# Patient Record
Sex: FEMALE | Race: BLACK OR AFRICAN AMERICAN | NOT HISPANIC OR LATINO | URBAN - METROPOLITAN AREA
[De-identification: names, ages, dates, MRNs, and addresses within clinical notes are randomized per-mention and may not be internally consistent; named-entity substitution may affect disease eponyms.]

---

## 2018-06-02 ENCOUNTER — EMERGENCY (EMERGENCY)
Facility: HOSPITAL | Age: 38
LOS: 1 days | Discharge: HOME | End: 2018-06-02
Attending: EMERGENCY MEDICINE | Admitting: EMERGENCY MEDICINE
Payer: MEDICAID

## 2018-06-02 VITALS
HEART RATE: 94 BPM | SYSTOLIC BLOOD PRESSURE: 105 MMHG | TEMPERATURE: 98 F | RESPIRATION RATE: 20 BRPM | DIASTOLIC BLOOD PRESSURE: 65 MMHG | OXYGEN SATURATION: 100 %

## 2018-06-02 VITALS
DIASTOLIC BLOOD PRESSURE: 78 MMHG | RESPIRATION RATE: 16 BRPM | OXYGEN SATURATION: 100 % | TEMPERATURE: 97 F | SYSTOLIC BLOOD PRESSURE: 113 MMHG | HEART RATE: 82 BPM

## 2018-06-02 LAB
ALBUMIN SERPL ELPH-MCNC: 3.9 G/DL — SIGNIFICANT CHANGE UP (ref 3.3–5)
ALP SERPL-CCNC: 107 U/L — SIGNIFICANT CHANGE UP (ref 40–120)
ALT FLD-CCNC: 19 U/L — SIGNIFICANT CHANGE UP (ref 4–33)
ANISOCYTOSIS BLD QL: SLIGHT — SIGNIFICANT CHANGE UP
APPEARANCE UR: CLEAR — SIGNIFICANT CHANGE UP
AST SERPL-CCNC: 23 U/L — SIGNIFICANT CHANGE UP (ref 4–32)
BASOPHILS # BLD AUTO: 0.09 K/UL — SIGNIFICANT CHANGE UP (ref 0–0.2)
BASOPHILS NFR BLD AUTO: 0.5 % — SIGNIFICANT CHANGE UP (ref 0–2)
BASOPHILS NFR SPEC: 0 % — SIGNIFICANT CHANGE UP (ref 0–2)
BILIRUB SERPL-MCNC: 0.3 MG/DL — SIGNIFICANT CHANGE UP (ref 0.2–1.2)
BILIRUB UR-MCNC: NEGATIVE — SIGNIFICANT CHANGE UP
BLOOD UR QL VISUAL: HIGH
BUN SERPL-MCNC: 8 MG/DL — SIGNIFICANT CHANGE UP (ref 7–23)
CALCIUM SERPL-MCNC: 8.6 MG/DL — SIGNIFICANT CHANGE UP (ref 8.4–10.5)
CHLORIDE SERPL-SCNC: 98 MMOL/L — SIGNIFICANT CHANGE UP (ref 98–107)
CO2 SERPL-SCNC: 26 MMOL/L — SIGNIFICANT CHANGE UP (ref 22–31)
COLOR SPEC: SIGNIFICANT CHANGE UP
CREAT SERPL-MCNC: 0.6 MG/DL — SIGNIFICANT CHANGE UP (ref 0.5–1.3)
EOSINOPHIL # BLD AUTO: 0.01 K/UL — SIGNIFICANT CHANGE UP (ref 0–0.5)
EOSINOPHIL NFR BLD AUTO: 0.1 % — SIGNIFICANT CHANGE UP (ref 0–6)
EOSINOPHIL NFR FLD: 0 % — SIGNIFICANT CHANGE UP (ref 0–6)
GIANT PLATELETS BLD QL SMEAR: PRESENT — SIGNIFICANT CHANGE UP
GLUCOSE SERPL-MCNC: 244 MG/DL — HIGH (ref 70–99)
GLUCOSE UR-MCNC: >1000 — SIGNIFICANT CHANGE UP
HCG SERPL-ACNC: < 5 MIU/ML — SIGNIFICANT CHANGE UP
HCT VFR BLD CALC: 39.7 % — SIGNIFICANT CHANGE UP (ref 34.5–45)
HGB BLD-MCNC: 10.6 G/DL — LOW (ref 11.5–15.5)
HYPOCHROMIA BLD QL: SIGNIFICANT CHANGE UP
IMM GRANULOCYTES # BLD AUTO: 0.12 # — SIGNIFICANT CHANGE UP
IMM GRANULOCYTES NFR BLD AUTO: 0.7 % — SIGNIFICANT CHANGE UP (ref 0–1.5)
KETONES UR-MCNC: NEGATIVE — SIGNIFICANT CHANGE UP
LEUKOCYTE ESTERASE UR-ACNC: NEGATIVE — SIGNIFICANT CHANGE UP
LYMPHOCYTES # BLD AUTO: 1.09 K/UL — SIGNIFICANT CHANGE UP (ref 1–3.3)
LYMPHOCYTES # BLD AUTO: 6 % — LOW (ref 13–44)
LYMPHOCYTES NFR SPEC AUTO: 0 % — LOW (ref 13–44)
MACROCYTES BLD QL: SLIGHT — SIGNIFICANT CHANGE UP
MCHC RBC-ENTMCNC: 18.1 PG — LOW (ref 27–34)
MCHC RBC-ENTMCNC: 26.7 % — LOW (ref 32–36)
MCV RBC AUTO: 67.7 FL — LOW (ref 80–100)
MICROCYTES BLD QL: SLIGHT — SIGNIFICANT CHANGE UP
MONOCYTES # BLD AUTO: 0.62 K/UL — SIGNIFICANT CHANGE UP (ref 0–0.9)
MONOCYTES NFR BLD AUTO: 3.4 % — SIGNIFICANT CHANGE UP (ref 2–14)
MONOCYTES NFR BLD: 1.8 % — LOW (ref 2–9)
MUCOUS THREADS # UR AUTO: SIGNIFICANT CHANGE UP
NEUTROPHIL AB SER-ACNC: 84.8 % — HIGH (ref 43–77)
NEUTROPHILS # BLD AUTO: 16.34 K/UL — HIGH (ref 1.8–7.4)
NEUTROPHILS NFR BLD AUTO: 89.3 % — HIGH (ref 43–77)
NEUTS BAND # BLD: 5.4 % — SIGNIFICANT CHANGE UP (ref 0–6)
NITRITE UR-MCNC: NEGATIVE — SIGNIFICANT CHANGE UP
NRBC # FLD: 0 — SIGNIFICANT CHANGE UP
PH UR: 7 — SIGNIFICANT CHANGE UP (ref 4.6–8)
PLATELET # BLD AUTO: 291 K/UL — SIGNIFICANT CHANGE UP (ref 150–400)
PLATELET COUNT - ESTIMATE: NORMAL — SIGNIFICANT CHANGE UP
PMV BLD: SIGNIFICANT CHANGE UP FL (ref 7–13)
POIKILOCYTOSIS BLD QL AUTO: SIGNIFICANT CHANGE UP
POLYCHROMASIA BLD QL SMEAR: SIGNIFICANT CHANGE UP
POTASSIUM SERPL-MCNC: 4.8 MMOL/L — SIGNIFICANT CHANGE UP (ref 3.5–5.3)
POTASSIUM SERPL-SCNC: 4.8 MMOL/L — SIGNIFICANT CHANGE UP (ref 3.5–5.3)
PROT SERPL-MCNC: 8.5 G/DL — HIGH (ref 6–8.3)
PROT UR-MCNC: 20 MG/DL — SIGNIFICANT CHANGE UP
RBC # BLD: 5.86 M/UL — HIGH (ref 3.8–5.2)
RBC # FLD: 32.5 % — HIGH (ref 10.3–14.5)
RBC CASTS # UR COMP ASSIST: SIGNIFICANT CHANGE UP (ref 0–?)
SODIUM SERPL-SCNC: 134 MMOL/L — LOW (ref 135–145)
SP GR SPEC: 1.01 — SIGNIFICANT CHANGE UP (ref 1–1.04)
SQUAMOUS # UR AUTO: SIGNIFICANT CHANGE UP
TARGETS BLD QL SMEAR: SIGNIFICANT CHANGE UP
UROBILINOGEN FLD QL: NORMAL MG/DL — SIGNIFICANT CHANGE UP
VARIANT LYMPHS # BLD: 8 % — SIGNIFICANT CHANGE UP
WBC # BLD: 18.27 K/UL — HIGH (ref 3.8–10.5)
WBC # FLD AUTO: 18.27 K/UL — HIGH (ref 3.8–10.5)
WBC UR QL: SIGNIFICANT CHANGE UP (ref 0–?)

## 2018-06-02 PROCEDURE — 99284 EMERGENCY DEPT VISIT MOD MDM: CPT

## 2018-06-02 PROCEDURE — 71046 X-RAY EXAM CHEST 2 VIEWS: CPT | Mod: 26

## 2018-06-02 RX ORDER — INSULIN HUMAN 100 [IU]/ML
20 INJECTION, SOLUTION SUBCUTANEOUS ONCE
Qty: 0 | Refills: 0 | Status: COMPLETED | OUTPATIENT
Start: 2018-06-02 | End: 2018-06-02

## 2018-06-02 RX ORDER — HUMAN INSULIN 100 [IU]/ML
7 INJECTION, SUSPENSION SUBCUTANEOUS ONCE
Qty: 0 | Refills: 0 | Status: DISCONTINUED | OUTPATIENT
Start: 2018-06-02 | End: 2018-06-02

## 2018-06-02 RX ORDER — INSULIN HUMAN 100 [IU]/ML
20 INJECTION, SOLUTION SUBCUTANEOUS ONCE
Qty: 0 | Refills: 0 | Status: DISCONTINUED | OUTPATIENT
Start: 2018-06-02 | End: 2018-06-02

## 2018-06-02 RX ADMIN — INSULIN HUMAN 20 UNIT(S): 100 INJECTION, SOLUTION SUBCUTANEOUS at 19:53

## 2018-06-02 NOTE — ED PROVIDER NOTE - PROGRESS NOTE DETAILS
pt feeling improved. tolerating PO. labs pending. to give pt's evening dose of regular insulin, monitor for episode of hypoglycemia. - Rob Simons MD pt feels well at this time. lab with elevated wbc but no other sig abnormality. pt denies any infectious sx. has eaten meal in ED. recently given dose of regular insulin, will monitor, if feels well and repeat wbg wnl plan for d/c. - Rob Simons MD DO Héctor: Patient feels improved at this time. Ate meal and was given evening dose of insulin. No infectious pathology on labs or cxr. Has had no further episodes of hypoglycemia. Educated on not dieting without consulting with her endocrinologist for adjustment of her insulin dose. Pt feels well. Elevated WBG at this time, but just received dose short acting insulin. To take dose long acting insulin when returns home. Discussed indications to return to ED, importance of f/u with pmd iwthin next 2-3 days. Educated on importance of not changing meal intake without first speaking to her doctor. Stable for d/c. - Rob Simons MD

## 2018-06-02 NOTE — ED ADULT NURSE NOTE - OBJECTIVE STATEMENT
Pt rcopal in room 5, aox3, ambulatory, presents to the ed with hypoglycemia, FS<20, was given IM glucagon and oral glucose by EMS, FS in the ED now 131. Pt seen by MD, pt NAD, laying comfortably in stretcher.

## 2018-06-02 NOTE — ED ADULT TRIAGE NOTE - CHIEF COMPLAINT QUOTE
Pt with hypoglycemic episode today, upon EMS arrival pt was lethargic and pale, FS was <20. Pt received IM glucagon and oral glucose by EMS with improvement to . Pt denies any complaints at this time. H/o HIV, DM.

## 2018-06-02 NOTE — ED PROVIDER NOTE - OBJECTIVE STATEMENT
36 y/o F with h/o HIV 36 y/o F with h/o HIV, DM presents with complaint of hypoglycemia. Patient was feeling faint this afternoon and father called EMS. FSG was less than 20 as per EMS and received IM glucagon as  well as oral glucose. Now feels improved. States that she started a new diet today and did not have any lunch. Also began having menstrual period yesterday and has history of low glucose in setting of menstrual periods. No recent medication adjustments. Takes 30 U regular insuliun and 10U NPH in morning, 20U regular insulin and 7U NPH in evening. Last took insulin 9 hours prior to arrival. Denies fever, chills, chest pain, SOB, abdominal pain, nausea/vomiting, diarrhea, dysuria, increased frequency.

## 2018-06-02 NOTE — ED PROVIDER NOTE - ATTENDING CONTRIBUTION TO CARE
37F with HIV, DM, presents s/p episode hypoglycemia. Pt states felt weak this afternoon, yelled out to father who found patient minimally responsive. EMS arrived, found WBG to be 20, gave IM glucagon and oral glucose. Pt now feels at baseline. Pt started new diet yesterday, today ate only a "diet meal" for breakfast and did not eat lunch. Pt typically eats lunch. Also with LMP beginning yesterday. Denies any additional doses of insulin, took morning dose as written. Denies cough, congestion, f/c, n/v/d, dysuria, hematuria, chest pain, sob.     PE: NAD, NCAT, MMM, Trachea midline, Normal conjunctiva, lungs CTAB, S1/S2 RRR, Normal perfusion, 2+ radial pulses bilat, Abdomen Soft, NTND, No rebound/guarding, No LE edema, No deformity of extremities, No rashes,  No focal motor or sensory deficits.     37F with HIV, DM, presents s/p episode hypoglycemia. WBG now wnl. Pt ate diet meal for breakfast and did not eat lunch, which explains hypoglycemia. Also currently menstruating, pt states wbg drops during menstruation. Check CBC eval for anemia, cmp eval for metabolic derangement. UA eval for UTI, CXR eval for pneumonia. Feed, observe, and re-assess. - Rob Simons MD

## 2018-06-02 NOTE — ED PROVIDER NOTE - MEDICAL DECISION MAKING DETAILS
38 y/o F with h/o HIV, IDDM presents with hypoglycemia in setting of missed meal. Back to baseline, . Will monitor for hypoglycemia in ED. Evaluate for infectious causes of hypoglycemia. Will likely be able to be discharged if no further episodes

## 2018-06-04 LAB
BACTERIA UR CULT: SIGNIFICANT CHANGE UP
SPECIMEN SOURCE: SIGNIFICANT CHANGE UP

## 2018-09-12 ENCOUNTER — EMERGENCY (EMERGENCY)
Facility: HOSPITAL | Age: 38
LOS: 0 days | Discharge: LEFT BEFORE DISCHARGE | End: 2018-09-12
Attending: EMERGENCY MEDICINE
Payer: MEDICAID

## 2018-09-12 VITALS
TEMPERATURE: 98 F | HEART RATE: 74 BPM | WEIGHT: 125 LBS | OXYGEN SATURATION: 97 % | HEIGHT: 65 IN | RESPIRATION RATE: 15 BRPM | DIASTOLIC BLOOD PRESSURE: 67 MMHG | SYSTOLIC BLOOD PRESSURE: 102 MMHG

## 2018-09-12 VITALS
RESPIRATION RATE: 17 BRPM | HEART RATE: 82 BPM | SYSTOLIC BLOOD PRESSURE: 107 MMHG | TEMPERATURE: 99 F | DIASTOLIC BLOOD PRESSURE: 61 MMHG | OXYGEN SATURATION: 99 %

## 2018-09-12 DIAGNOSIS — Z21 ASYMPTOMATIC HUMAN IMMUNODEFICIENCY VIRUS [HIV] INFECTION STATUS: ICD-10-CM

## 2018-09-12 DIAGNOSIS — E11.649 TYPE 2 DIABETES MELLITUS WITH HYPOGLYCEMIA WITHOUT COMA: ICD-10-CM

## 2018-09-12 DIAGNOSIS — Z79.4 LONG TERM (CURRENT) USE OF INSULIN: ICD-10-CM

## 2018-09-12 LAB
GLUCOSE BLDC GLUCOMTR-MCNC: 111 MG/DL — HIGH (ref 70–99)
GLUCOSE BLDC GLUCOMTR-MCNC: 170 MG/DL — HIGH (ref 70–99)

## 2018-09-12 PROCEDURE — 99282 EMERGENCY DEPT VISIT SF MDM: CPT

## 2018-09-12 NOTE — ED PROVIDER NOTE - OBJECTIVE STATEMENT
38yo female with pmh DM, HIV presents with hypoglycemia. Pt noted low glucose yesterday but refused to go to hospital. Sister found pt acting erratically today and called EMS, with FS 40. Pt given dextrose by EMs and now AOx3. Pt refusing workup. States on humlin and lantus. Pt states she fell asleep today and did not eat. Pt denies any symptoms.     ROS: No fever/chills. No photophobia/eye pain/changes in vision, No ear pain/sore throat/dysphagia, No chest pain/palpitations. No SOB/cough/stridor. No abdominal pain, N/V/D, no black/bloody bm. No dysuria/frequency/discharge, No headache. No Dizziness.  No rash.  No numbness/tingling/weakness.

## 2018-09-12 NOTE — ED ADULT NURSE NOTE - CHIEF COMPLAINT QUOTE
sister called pt acting strange Per ems fingerstick 40mg/dl alert and oriented x2 IV access left forearm and Dextrose 10 250ml bag infusing. EMS called last night for low blood sugar and pt refused medical care

## 2018-09-12 NOTE — ED ADULT NURSE NOTE - OBJECTIVE STATEMENT
38 yo female c/o hypoglycemia x yesterday and again today, as per triage RN EMS  to pt yesterday, pt RMA. pt was given D10 pta. via L forearm access   #20 g, no s/s of infiltration. as per MD pt to sign AMA. pt expressed she wants to leave AMA. pt given tray of food and eating. pt refused to give pharmacy information.

## 2018-09-12 NOTE — ED PROVIDER NOTE - MEDICAL DECISION MAKING DETAILS
Pt clearly awake and alert. Pt able to give good history and dosing. Pt ate an entire tray of food in Er. The patient has decided to leave against medical advice.  We discussed all risks, benefits, and alternatives to the progression of treatment and the potential dangers of leaving including but not limited to permanent disability, injury, and death.  The patient was instructed that they are welcome to change their decision to leave against medical advice and return to the emergency department at any time and for any reason in order to allow us to render care.

## 2018-09-13 PROBLEM — E11.9 TYPE 2 DIABETES MELLITUS WITHOUT COMPLICATIONS: Chronic | Status: ACTIVE | Noted: 2018-06-02

## 2018-09-13 PROBLEM — B20 HUMAN IMMUNODEFICIENCY VIRUS [HIV] DISEASE: Chronic | Status: ACTIVE | Noted: 2018-06-02

## 2021-02-09 ENCOUNTER — INPATIENT (INPATIENT)
Facility: HOSPITAL | Age: 41
LOS: 7 days | Discharge: HOME HEALTH | End: 2021-02-17
Attending: INTERNAL MEDICINE | Admitting: INTERNAL MEDICINE
Payer: MEDICAID

## 2021-02-09 VITALS
SYSTOLIC BLOOD PRESSURE: 140 MMHG | WEIGHT: 145.51 LBS | RESPIRATION RATE: 17 BRPM | DIASTOLIC BLOOD PRESSURE: 78 MMHG | TEMPERATURE: 98 F | OXYGEN SATURATION: 100 % | HEIGHT: 65 IN | HEART RATE: 110 BPM

## 2021-02-09 LAB
APPEARANCE UR: CLEAR — SIGNIFICANT CHANGE UP
BILIRUB UR-MCNC: NEGATIVE — SIGNIFICANT CHANGE UP
COLOR SPEC: YELLOW — SIGNIFICANT CHANGE UP
DIFF PNL FLD: NEGATIVE — SIGNIFICANT CHANGE UP
GLUCOSE BLDC GLUCOMTR-MCNC: 409 MG/DL — HIGH (ref 70–99)
GLUCOSE BLDC GLUCOMTR-MCNC: 415 MG/DL — HIGH (ref 70–99)
GLUCOSE UR QL: 1000 MG/DL
KETONES UR-MCNC: ABNORMAL
LEUKOCYTE ESTERASE UR-ACNC: NEGATIVE — SIGNIFICANT CHANGE UP
NITRITE UR-MCNC: NEGATIVE — SIGNIFICANT CHANGE UP
PH UR: 5 — SIGNIFICANT CHANGE UP (ref 5–8)
PROT UR-MCNC: 30 MG/DL
SP GR SPEC: 1.02 — SIGNIFICANT CHANGE UP (ref 1.01–1.02)
UROBILINOGEN FLD QL: NEGATIVE MG/DL — SIGNIFICANT CHANGE UP

## 2021-02-09 PROCEDURE — 99285 EMERGENCY DEPT VISIT HI MDM: CPT

## 2021-02-09 RX ORDER — FAMOTIDINE 10 MG/ML
20 INJECTION INTRAVENOUS ONCE
Refills: 0 | Status: COMPLETED | OUTPATIENT
Start: 2021-02-09 | End: 2021-02-09

## 2021-02-09 RX ORDER — METOCLOPRAMIDE HCL 10 MG
10 TABLET ORAL ONCE
Refills: 0 | Status: COMPLETED | OUTPATIENT
Start: 2021-02-09 | End: 2021-02-09

## 2021-02-09 RX ORDER — SODIUM CHLORIDE 9 MG/ML
2000 INJECTION INTRAMUSCULAR; INTRAVENOUS; SUBCUTANEOUS ONCE
Refills: 0 | Status: COMPLETED | OUTPATIENT
Start: 2021-02-09 | End: 2021-02-09

## 2021-02-09 NOTE — ED ADULT NURSE NOTE - OBJECTIVE STATEMENT
40F awake and alert, verbally responsive with h/o HIV and DM1, p/w c/o nausea and vomiting started today and unable to keep foods or fluids, mother at bedside, reports she gave her insulin today and reports patient has multiple admission in the past for DKA. Patient afebrile, vomiting actively, no blood in vomitus noted. 40F awake and verbally responsive not oriented to anything with h/o HIV and DM1, p/w c/o nausea and vomiting started yesterday and unable to keep foods or fluids, mother at bedside, reports she gave her insulin today and reports patient has multiple admission in the past for DKA. Patient afebrile, vomiting actively, no blood in vomitus noted.  During assessment, patient is aphasic, doesn't follow commands and when mother asked about pt hisotry, patient to found out just dc from [facility], pt has been in [facility] for more than a year secondary to diabetic coma and has been in rehab since then.

## 2021-02-09 NOTE — ED ADULT NURSE NOTE - NSIMPLEMENTINTERV_GEN_ALL_ED
Implemented All Fall Risk Interventions:  [place] to call system. Call bell, personal items and telephone within reach. Instruct patient to call for assistance. Room bathroom lighting operational. Non-slip footwear when patient is off stretcher. Physically safe environment: no spills, clutter or unnecessary equipment. Stretcher in lowest position, wheels locked, appropriate side rails in place. Provide visual cue, wrist band, yellow gown, etc. Monitor gait and stability. Monitor for mental status changes and reorient to person, place, and time. Review medications for side effects contributing to fall risk. Reinforce activity limits and safety measures with patient and family.

## 2021-02-09 NOTE — ED ADULT TRIAGE NOTE - CHIEF COMPLAINT QUOTE
Patient presents in ED complaining of vomiting unable to keep food down since 11.00hrs today., HX of juvenile DM, recent changes made to insulin regime.  had received 10 units of humlog at 18.00hrs

## 2021-02-10 DIAGNOSIS — Z65.9 PROBLEM RELATED TO UNSPECIFIED PSYCHOSOCIAL CIRCUMSTANCES: ICD-10-CM

## 2021-02-10 DIAGNOSIS — E10.10 TYPE 1 DIABETES MELLITUS WITH KETOACIDOSIS WITHOUT COMA: ICD-10-CM

## 2021-02-10 DIAGNOSIS — E87.2 ACIDOSIS: ICD-10-CM

## 2021-02-10 DIAGNOSIS — B20 HUMAN IMMUNODEFICIENCY VIRUS [HIV] DISEASE: ICD-10-CM

## 2021-02-10 LAB
ACETONE SERPL-MCNC: ABNORMAL
ALBUMIN SERPL ELPH-MCNC: 3.6 G/DL — SIGNIFICANT CHANGE UP (ref 3.3–5)
ALBUMIN SERPL ELPH-MCNC: 3.6 G/DL — SIGNIFICANT CHANGE UP (ref 3.3–5)
ALBUMIN SERPL ELPH-MCNC: 4.3 G/DL — SIGNIFICANT CHANGE UP (ref 3.3–5)
ALP SERPL-CCNC: 143 U/L — HIGH (ref 40–120)
ALP SERPL-CCNC: 146 U/L — HIGH (ref 40–120)
ALP SERPL-CCNC: 173 U/L — HIGH (ref 40–120)
ALT FLD-CCNC: 22 U/L — SIGNIFICANT CHANGE UP (ref 12–78)
ALT FLD-CCNC: 24 U/L — SIGNIFICANT CHANGE UP (ref 12–78)
ALT FLD-CCNC: 28 U/L — SIGNIFICANT CHANGE UP (ref 12–78)
AMPHET UR-MCNC: NEGATIVE — SIGNIFICANT CHANGE UP
ANION GAP SERPL CALC-SCNC: 15 MMOL/L — SIGNIFICANT CHANGE UP (ref 5–17)
ANION GAP SERPL CALC-SCNC: 17 MMOL/L — SIGNIFICANT CHANGE UP (ref 5–17)
ANION GAP SERPL CALC-SCNC: 18 MMOL/L — HIGH (ref 5–17)
ANION GAP SERPL CALC-SCNC: 21 MMOL/L — HIGH (ref 5–17)
ANION GAP SERPL CALC-SCNC: 23 MMOL/L — HIGH (ref 5–17)
APTT BLD: 43.6 SEC — HIGH (ref 27.5–35.5)
AST SERPL-CCNC: 22 U/L — SIGNIFICANT CHANGE UP (ref 15–37)
AST SERPL-CCNC: 26 U/L — SIGNIFICANT CHANGE UP (ref 15–37)
AST SERPL-CCNC: 29 U/L — SIGNIFICANT CHANGE UP (ref 15–37)
BACTERIA # UR AUTO: ABNORMAL
BARBITURATES UR SCN-MCNC: NEGATIVE — SIGNIFICANT CHANGE UP
BASE EXCESS BLDA CALC-SCNC: -10.5 MMOL/L — LOW (ref -2–2)
BASE EXCESS BLDA CALC-SCNC: -12 MMOL/L — LOW (ref -2–2)
BASOPHILS # BLD AUTO: 0.05 K/UL — SIGNIFICANT CHANGE UP (ref 0–0.2)
BASOPHILS NFR BLD AUTO: 0.3 % — SIGNIFICANT CHANGE UP (ref 0–2)
BENZODIAZ UR-MCNC: NEGATIVE — SIGNIFICANT CHANGE UP
BILIRUB SERPL-MCNC: 0.4 MG/DL — SIGNIFICANT CHANGE UP (ref 0.2–1.2)
BILIRUB SERPL-MCNC: 0.5 MG/DL — SIGNIFICANT CHANGE UP (ref 0.2–1.2)
BILIRUB SERPL-MCNC: 0.5 MG/DL — SIGNIFICANT CHANGE UP (ref 0.2–1.2)
BLOOD GAS COMMENTS: SIGNIFICANT CHANGE UP
BLOOD GAS SOURCE: SIGNIFICANT CHANGE UP
BLOOD GAS SOURCE: SIGNIFICANT CHANGE UP
BUN SERPL-MCNC: 10 MG/DL — SIGNIFICANT CHANGE UP (ref 7–23)
BUN SERPL-MCNC: 14 MG/DL — SIGNIFICANT CHANGE UP (ref 7–23)
BUN SERPL-MCNC: 14 MG/DL — SIGNIFICANT CHANGE UP (ref 7–23)
BUN SERPL-MCNC: 17 MG/DL — SIGNIFICANT CHANGE UP (ref 7–23)
BUN SERPL-MCNC: 8 MG/DL — SIGNIFICANT CHANGE UP (ref 7–23)
CALCIUM SERPL-MCNC: 8.7 MG/DL — SIGNIFICANT CHANGE UP (ref 8.5–10.1)
CALCIUM SERPL-MCNC: 8.9 MG/DL — SIGNIFICANT CHANGE UP (ref 8.5–10.1)
CALCIUM SERPL-MCNC: 9.1 MG/DL — SIGNIFICANT CHANGE UP (ref 8.5–10.1)
CALCIUM SERPL-MCNC: 9.1 MG/DL — SIGNIFICANT CHANGE UP (ref 8.5–10.1)
CALCIUM SERPL-MCNC: 9.8 MG/DL — SIGNIFICANT CHANGE UP (ref 8.5–10.1)
CHLORIDE SERPL-SCNC: 100 MMOL/L — SIGNIFICANT CHANGE UP (ref 96–108)
CHLORIDE SERPL-SCNC: 107 MMOL/L — SIGNIFICANT CHANGE UP (ref 96–108)
CHLORIDE SERPL-SCNC: 107 MMOL/L — SIGNIFICANT CHANGE UP (ref 96–108)
CHLORIDE SERPL-SCNC: 108 MMOL/L — SIGNIFICANT CHANGE UP (ref 96–108)
CHLORIDE SERPL-SCNC: 109 MMOL/L — HIGH (ref 96–108)
CO2 SERPL-SCNC: 14 MMOL/L — LOW (ref 22–31)
CO2 SERPL-SCNC: 15 MMOL/L — LOW (ref 22–31)
CO2 SERPL-SCNC: 18 MMOL/L — LOW (ref 22–31)
CO2 SERPL-SCNC: 19 MMOL/L — LOW (ref 22–31)
CO2 SERPL-SCNC: 9 MMOL/L — CRITICAL LOW (ref 22–31)
COCAINE METAB.OTHER UR-MCNC: NEGATIVE — SIGNIFICANT CHANGE UP
CREAT SERPL-MCNC: 0.94 MG/DL — SIGNIFICANT CHANGE UP (ref 0.5–1.3)
CREAT SERPL-MCNC: 1 MG/DL — SIGNIFICANT CHANGE UP (ref 0.5–1.3)
CREAT SERPL-MCNC: 1 MG/DL — SIGNIFICANT CHANGE UP (ref 0.5–1.3)
CREAT SERPL-MCNC: 1.11 MG/DL — SIGNIFICANT CHANGE UP (ref 0.5–1.3)
CREAT SERPL-MCNC: 1.16 MG/DL — SIGNIFICANT CHANGE UP (ref 0.5–1.3)
EOSINOPHIL # BLD AUTO: 0 K/UL — SIGNIFICANT CHANGE UP (ref 0–0.5)
EOSINOPHIL NFR BLD AUTO: 0 % — SIGNIFICANT CHANGE UP (ref 0–6)
EPI CELLS # UR: SIGNIFICANT CHANGE UP
GLUCOSE BLDC GLUCOMTR-MCNC: 128 MG/DL — HIGH (ref 70–99)
GLUCOSE BLDC GLUCOMTR-MCNC: 180 MG/DL — HIGH (ref 70–99)
GLUCOSE BLDC GLUCOMTR-MCNC: 269 MG/DL — HIGH (ref 70–99)
GLUCOSE BLDC GLUCOMTR-MCNC: 272 MG/DL — HIGH (ref 70–99)
GLUCOSE BLDC GLUCOMTR-MCNC: 283 MG/DL — HIGH (ref 70–99)
GLUCOSE BLDC GLUCOMTR-MCNC: 288 MG/DL — HIGH (ref 70–99)
GLUCOSE BLDC GLUCOMTR-MCNC: 293 MG/DL — HIGH (ref 70–99)
GLUCOSE BLDC GLUCOMTR-MCNC: 295 MG/DL — HIGH (ref 70–99)
GLUCOSE BLDC GLUCOMTR-MCNC: 354 MG/DL — HIGH (ref 70–99)
GLUCOSE BLDC GLUCOMTR-MCNC: 363 MG/DL — HIGH (ref 70–99)
GLUCOSE BLDC GLUCOMTR-MCNC: 373 MG/DL — HIGH (ref 70–99)
GLUCOSE BLDC GLUCOMTR-MCNC: 449 MG/DL — HIGH (ref 70–99)
GLUCOSE BLDC GLUCOMTR-MCNC: 74 MG/DL — SIGNIFICANT CHANGE UP (ref 70–99)
GLUCOSE BLDC GLUCOMTR-MCNC: 75 MG/DL — SIGNIFICANT CHANGE UP (ref 70–99)
GLUCOSE SERPL-MCNC: 299 MG/DL — HIGH (ref 70–99)
GLUCOSE SERPL-MCNC: 311 MG/DL — HIGH (ref 70–99)
GLUCOSE SERPL-MCNC: 388 MG/DL — HIGH (ref 70–99)
GLUCOSE SERPL-MCNC: 472 MG/DL — CRITICAL HIGH (ref 70–99)
GLUCOSE SERPL-MCNC: 65 MG/DL — LOW (ref 70–99)
HCG SERPL-ACNC: <1 MIU/ML — SIGNIFICANT CHANGE UP
HCO3 BLDA-SCNC: 13 MMOL/L — LOW (ref 21–29)
HCO3 BLDA-SCNC: 14 MMOL/L — LOW (ref 21–29)
HCT VFR BLD CALC: 39.6 % — SIGNIFICANT CHANGE UP (ref 34.5–45)
HCT VFR BLD CALC: 40 % — SIGNIFICANT CHANGE UP (ref 34.5–45)
HCT VFR BLD CALC: 46.3 % — HIGH (ref 34.5–45)
HGB BLD-MCNC: 11.9 G/DL — SIGNIFICANT CHANGE UP (ref 11.5–15.5)
HGB BLD-MCNC: 12.1 G/DL — SIGNIFICANT CHANGE UP (ref 11.5–15.5)
HGB BLD-MCNC: 14.1 G/DL — SIGNIFICANT CHANGE UP (ref 11.5–15.5)
HOROWITZ INDEX BLDA+IHG-RTO: 21 — SIGNIFICANT CHANGE UP
HOROWITZ INDEX BLDA+IHG-RTO: 21 — SIGNIFICANT CHANGE UP
IMM GRANULOCYTES NFR BLD AUTO: 0.3 % — SIGNIFICANT CHANGE UP (ref 0–1.5)
INR BLD: 1.05 RATIO — SIGNIFICANT CHANGE UP (ref 0.88–1.16)
LACTATE SERPL-SCNC: 1.9 MMOL/L — SIGNIFICANT CHANGE UP (ref 0.7–2)
LACTATE SERPL-SCNC: 2.8 MMOL/L — HIGH (ref 0.7–2)
LACTATE SERPL-SCNC: 2.8 MMOL/L — HIGH (ref 0.7–2)
LIDOCAIN IGE QN: 36 U/L — LOW (ref 73–393)
LYMPHOCYTES # BLD AUTO: 0.92 K/UL — LOW (ref 1–3.3)
LYMPHOCYTES # BLD AUTO: 6 % — LOW (ref 13–44)
MAGNESIUM SERPL-MCNC: 2 MG/DL — SIGNIFICANT CHANGE UP (ref 1.6–2.6)
MAGNESIUM SERPL-MCNC: 2.1 MG/DL — SIGNIFICANT CHANGE UP (ref 1.6–2.6)
MCHC RBC-ENTMCNC: 23.9 PG — LOW (ref 27–34)
MCHC RBC-ENTMCNC: 23.9 PG — LOW (ref 27–34)
MCHC RBC-ENTMCNC: 24 PG — LOW (ref 27–34)
MCHC RBC-ENTMCNC: 30.1 GM/DL — LOW (ref 32–36)
MCHC RBC-ENTMCNC: 30.3 GM/DL — LOW (ref 32–36)
MCHC RBC-ENTMCNC: 30.5 GM/DL — LOW (ref 32–36)
MCV RBC AUTO: 78.3 FL — LOW (ref 80–100)
MCV RBC AUTO: 79.1 FL — LOW (ref 80–100)
MCV RBC AUTO: 79.8 FL — LOW (ref 80–100)
METHADONE UR-MCNC: NEGATIVE — SIGNIFICANT CHANGE UP
MONOCYTES # BLD AUTO: 0.37 K/UL — SIGNIFICANT CHANGE UP (ref 0–0.9)
MONOCYTES NFR BLD AUTO: 2.4 % — SIGNIFICANT CHANGE UP (ref 2–14)
NEUTROPHILS # BLD AUTO: 13.94 K/UL — HIGH (ref 1.8–7.4)
NEUTROPHILS NFR BLD AUTO: 91 % — HIGH (ref 43–77)
NRBC # BLD: 0 /100 WBCS — SIGNIFICANT CHANGE UP (ref 0–0)
OPIATES UR-MCNC: NEGATIVE — SIGNIFICANT CHANGE UP
PCO2 BLDA: 26 MMHG — LOW (ref 32–46)
PCO2 BLDA: 30 MMHG — LOW (ref 32–46)
PCP SPEC-MCNC: SIGNIFICANT CHANGE UP
PCP UR-MCNC: NEGATIVE — SIGNIFICANT CHANGE UP
PH BLD: 7.3 — LOW (ref 7.35–7.45)
PH BLD: 7.31 — LOW (ref 7.35–7.45)
PHOSPHATE SERPL-MCNC: 1 MG/DL — CRITICAL LOW (ref 2.5–4.5)
PHOSPHATE SERPL-MCNC: 2.1 MG/DL — LOW (ref 2.5–4.5)
PLATELET # BLD AUTO: 188 K/UL — SIGNIFICANT CHANGE UP (ref 150–400)
PLATELET # BLD AUTO: 194 K/UL — SIGNIFICANT CHANGE UP (ref 150–400)
PLATELET # BLD AUTO: 217 K/UL — SIGNIFICANT CHANGE UP (ref 150–400)
PO2 BLDA: 85 MMHG — SIGNIFICANT CHANGE UP (ref 74–108)
PO2 BLDA: 87 MMHG — SIGNIFICANT CHANGE UP (ref 74–108)
POTASSIUM SERPL-MCNC: 3.9 MMOL/L — SIGNIFICANT CHANGE UP (ref 3.5–5.3)
POTASSIUM SERPL-MCNC: 4.2 MMOL/L — SIGNIFICANT CHANGE UP (ref 3.5–5.3)
POTASSIUM SERPL-MCNC: 4.3 MMOL/L — SIGNIFICANT CHANGE UP (ref 3.5–5.3)
POTASSIUM SERPL-MCNC: 4.5 MMOL/L — SIGNIFICANT CHANGE UP (ref 3.5–5.3)
POTASSIUM SERPL-MCNC: 4.8 MMOL/L — SIGNIFICANT CHANGE UP (ref 3.5–5.3)
POTASSIUM SERPL-SCNC: 3.9 MMOL/L — SIGNIFICANT CHANGE UP (ref 3.5–5.3)
POTASSIUM SERPL-SCNC: 4.2 MMOL/L — SIGNIFICANT CHANGE UP (ref 3.5–5.3)
POTASSIUM SERPL-SCNC: 4.3 MMOL/L — SIGNIFICANT CHANGE UP (ref 3.5–5.3)
POTASSIUM SERPL-SCNC: 4.5 MMOL/L — SIGNIFICANT CHANGE UP (ref 3.5–5.3)
POTASSIUM SERPL-SCNC: 4.8 MMOL/L — SIGNIFICANT CHANGE UP (ref 3.5–5.3)
PROT SERPL-MCNC: 8.1 GM/DL — SIGNIFICANT CHANGE UP (ref 6–8.3)
PROT SERPL-MCNC: 8.1 GM/DL — SIGNIFICANT CHANGE UP (ref 6–8.3)
PROT SERPL-MCNC: 9.9 GM/DL — HIGH (ref 6–8.3)
PROTHROM AB SERPL-ACNC: 12.1 SEC — SIGNIFICANT CHANGE UP (ref 10.6–13.6)
RAPID RVP RESULT: SIGNIFICANT CHANGE UP
RBC # BLD: 4.96 M/UL — SIGNIFICANT CHANGE UP (ref 3.8–5.2)
RBC # BLD: 5.06 M/UL — SIGNIFICANT CHANGE UP (ref 3.8–5.2)
RBC # BLD: 5.91 M/UL — HIGH (ref 3.8–5.2)
RBC # FLD: 14.4 % — SIGNIFICANT CHANGE UP (ref 10.3–14.5)
RBC # FLD: 14.8 % — HIGH (ref 10.3–14.5)
RBC # FLD: 14.9 % — HIGH (ref 10.3–14.5)
RBC CASTS # UR COMP ASSIST: NEGATIVE /HPF — SIGNIFICANT CHANGE UP (ref 0–4)
SAO2 % BLDA: 96 % — SIGNIFICANT CHANGE UP (ref 92–96)
SAO2 % BLDA: 96 % — SIGNIFICANT CHANGE UP (ref 92–96)
SARS-COV-2 IGG SERPL QL IA: POSITIVE
SARS-COV-2 IGM SERPL IA-ACNC: 54.8 INDEX — HIGH
SARS-COV-2 RNA SPEC QL NAA+PROBE: SIGNIFICANT CHANGE UP
SODIUM SERPL-SCNC: 138 MMOL/L — SIGNIFICANT CHANGE UP (ref 135–145)
SODIUM SERPL-SCNC: 139 MMOL/L — SIGNIFICANT CHANGE UP (ref 135–145)
SODIUM SERPL-SCNC: 140 MMOL/L — SIGNIFICANT CHANGE UP (ref 135–145)
SODIUM SERPL-SCNC: 141 MMOL/L — SIGNIFICANT CHANGE UP (ref 135–145)
SODIUM SERPL-SCNC: 142 MMOL/L — SIGNIFICANT CHANGE UP (ref 135–145)
THC UR QL: NEGATIVE — SIGNIFICANT CHANGE UP
TSH SERPL-MCNC: 2.93 UU/ML — SIGNIFICANT CHANGE UP (ref 0.36–3.74)
WBC # BLD: 15.33 K/UL — HIGH (ref 3.8–10.5)
WBC # BLD: 18.17 K/UL — HIGH (ref 3.8–10.5)
WBC # BLD: 20.79 K/UL — HIGH (ref 3.8–10.5)
WBC # FLD AUTO: 15.33 K/UL — HIGH (ref 3.8–10.5)
WBC # FLD AUTO: 18.17 K/UL — HIGH (ref 3.8–10.5)
WBC # FLD AUTO: 20.79 K/UL — HIGH (ref 3.8–10.5)
WBC UR QL: SIGNIFICANT CHANGE UP

## 2021-02-10 PROCEDURE — 93010 ELECTROCARDIOGRAM REPORT: CPT

## 2021-02-10 PROCEDURE — 74177 CT ABD & PELVIS W/CONTRAST: CPT | Mod: 26,MA

## 2021-02-10 PROCEDURE — 99222 1ST HOSP IP/OBS MODERATE 55: CPT

## 2021-02-10 PROCEDURE — 70450 CT HEAD/BRAIN W/O DYE: CPT | Mod: 26,MA

## 2021-02-10 PROCEDURE — 99291 CRITICAL CARE FIRST HOUR: CPT

## 2021-02-10 RX ORDER — SODIUM CHLORIDE 9 MG/ML
1000 INJECTION INTRAMUSCULAR; INTRAVENOUS; SUBCUTANEOUS ONCE
Refills: 0 | Status: COMPLETED | OUTPATIENT
Start: 2021-02-10 | End: 2021-02-10

## 2021-02-10 RX ORDER — SODIUM CHLORIDE 9 MG/ML
1000 INJECTION, SOLUTION INTRAVENOUS
Refills: 0 | Status: DISCONTINUED | OUTPATIENT
Start: 2021-02-10 | End: 2021-02-17

## 2021-02-10 RX ORDER — INSULIN LISPRO 100/ML
12 VIAL (ML) SUBCUTANEOUS ONCE
Refills: 0 | Status: COMPLETED | OUTPATIENT
Start: 2021-02-10 | End: 2021-02-10

## 2021-02-10 RX ORDER — SODIUM CHLORIDE 9 MG/ML
2000 INJECTION INTRAMUSCULAR; INTRAVENOUS; SUBCUTANEOUS ONCE
Refills: 0 | Status: COMPLETED | OUTPATIENT
Start: 2021-02-10 | End: 2021-02-10

## 2021-02-10 RX ORDER — DEXMEDETOMIDINE HYDROCHLORIDE IN 0.9% SODIUM CHLORIDE 4 UG/ML
0.2 INJECTION INTRAVENOUS
Qty: 200 | Refills: 0 | Status: DISCONTINUED | OUTPATIENT
Start: 2021-02-10 | End: 2021-02-11

## 2021-02-10 RX ORDER — DEXTROSE 50 % IN WATER 50 %
25 SYRINGE (ML) INTRAVENOUS ONCE
Refills: 0 | Status: DISCONTINUED | OUTPATIENT
Start: 2021-02-10 | End: 2021-02-17

## 2021-02-10 RX ORDER — INSULIN HUMAN 100 [IU]/ML
6 INJECTION, SOLUTION SUBCUTANEOUS
Qty: 100 | Refills: 0 | Status: DISCONTINUED | OUTPATIENT
Start: 2021-02-10 | End: 2021-02-11

## 2021-02-10 RX ORDER — SODIUM CHLORIDE 9 MG/ML
2000 INJECTION INTRAMUSCULAR; INTRAVENOUS; SUBCUTANEOUS ONCE
Refills: 0 | Status: DISCONTINUED | OUTPATIENT
Start: 2021-02-10 | End: 2021-02-10

## 2021-02-10 RX ORDER — DEXTROSE 50 % IN WATER 50 %
12.5 SYRINGE (ML) INTRAVENOUS ONCE
Refills: 0 | Status: DISCONTINUED | OUTPATIENT
Start: 2021-02-10 | End: 2021-02-17

## 2021-02-10 RX ORDER — SODIUM CHLORIDE 9 MG/ML
1000 INJECTION INTRAMUSCULAR; INTRAVENOUS; SUBCUTANEOUS
Refills: 0 | Status: DISCONTINUED | OUTPATIENT
Start: 2021-02-10 | End: 2021-02-10

## 2021-02-10 RX ORDER — INSULIN LISPRO 100/ML
VIAL (ML) SUBCUTANEOUS
Refills: 0 | Status: DISCONTINUED | OUTPATIENT
Start: 2021-02-10 | End: 2021-02-10

## 2021-02-10 RX ORDER — ENOXAPARIN SODIUM 100 MG/ML
40 INJECTION SUBCUTANEOUS DAILY
Refills: 0 | Status: DISCONTINUED | OUTPATIENT
Start: 2021-02-10 | End: 2021-02-17

## 2021-02-10 RX ORDER — SODIUM,POTASSIUM PHOSPHATES 278-250MG
1 POWDER IN PACKET (EA) ORAL
Refills: 0 | Status: DISCONTINUED | OUTPATIENT
Start: 2021-02-10 | End: 2021-02-11

## 2021-02-10 RX ORDER — BICTEGRAVIR SODIUM, EMTRICITABINE, AND TENOFOVIR ALAFENAMIDE FUMARATE 30; 120; 15 MG/1; MG/1; MG/1
1 TABLET ORAL DAILY
Refills: 0 | Status: DISCONTINUED | OUTPATIENT
Start: 2021-02-10 | End: 2021-02-17

## 2021-02-10 RX ORDER — SODIUM BICARBONATE 1 MEQ/ML
50 SYRINGE (ML) INTRAVENOUS ONCE
Refills: 0 | Status: COMPLETED | OUTPATIENT
Start: 2021-02-10 | End: 2021-02-10

## 2021-02-10 RX ORDER — HALOPERIDOL DECANOATE 100 MG/ML
5 INJECTION INTRAMUSCULAR ONCE
Refills: 0 | Status: COMPLETED | OUTPATIENT
Start: 2021-02-10 | End: 2021-02-10

## 2021-02-10 RX ORDER — ONDANSETRON 8 MG/1
4 TABLET, FILM COATED ORAL EVERY 8 HOURS
Refills: 0 | Status: DISCONTINUED | OUTPATIENT
Start: 2021-02-10 | End: 2021-02-11

## 2021-02-10 RX ORDER — DEXTROSE 50 % IN WATER 50 %
15 SYRINGE (ML) INTRAVENOUS ONCE
Refills: 0 | Status: DISCONTINUED | OUTPATIENT
Start: 2021-02-10 | End: 2021-02-17

## 2021-02-10 RX ORDER — GLUCAGON INJECTION, SOLUTION 0.5 MG/.1ML
1 INJECTION, SOLUTION SUBCUTANEOUS ONCE
Refills: 0 | Status: DISCONTINUED | OUTPATIENT
Start: 2021-02-10 | End: 2021-02-17

## 2021-02-10 RX ORDER — INSULIN HUMAN 100 [IU]/ML
8 INJECTION, SOLUTION SUBCUTANEOUS ONCE
Refills: 0 | Status: COMPLETED | OUTPATIENT
Start: 2021-02-10 | End: 2021-02-10

## 2021-02-10 RX ORDER — SODIUM,POTASSIUM PHOSPHATES 278-250MG
1 POWDER IN PACKET (EA) ORAL
Refills: 0 | Status: DISCONTINUED | OUTPATIENT
Start: 2021-02-10 | End: 2021-02-10

## 2021-02-10 RX ORDER — INSULIN GLARGINE 100 [IU]/ML
10 INJECTION, SOLUTION SUBCUTANEOUS EVERY MORNING
Refills: 0 | Status: DISCONTINUED | OUTPATIENT
Start: 2021-02-10 | End: 2021-02-10

## 2021-02-10 RX ORDER — SODIUM CHLORIDE 9 MG/ML
1000 INJECTION, SOLUTION INTRAVENOUS
Refills: 0 | Status: DISCONTINUED | OUTPATIENT
Start: 2021-02-10 | End: 2021-02-11

## 2021-02-10 RX ORDER — MORPHINE SULFATE 50 MG/1
4 CAPSULE, EXTENDED RELEASE ORAL ONCE
Refills: 0 | Status: DISCONTINUED | OUTPATIENT
Start: 2021-02-10 | End: 2021-02-10

## 2021-02-10 RX ORDER — CHLORHEXIDINE GLUCONATE 213 G/1000ML
1 SOLUTION TOPICAL
Refills: 0 | Status: DISCONTINUED | OUTPATIENT
Start: 2021-02-10 | End: 2021-02-17

## 2021-02-10 RX ADMIN — SODIUM CHLORIDE 100 MILLILITER(S): 9 INJECTION, SOLUTION INTRAVENOUS at 23:14

## 2021-02-10 RX ADMIN — Medication 3: at 10:53

## 2021-02-10 RX ADMIN — SODIUM CHLORIDE 200 MILLILITER(S): 9 INJECTION INTRAMUSCULAR; INTRAVENOUS; SUBCUTANEOUS at 10:54

## 2021-02-10 RX ADMIN — SODIUM CHLORIDE 1000 MILLILITER(S): 9 INJECTION INTRAMUSCULAR; INTRAVENOUS; SUBCUTANEOUS at 11:26

## 2021-02-10 RX ADMIN — INSULIN HUMAN 6 UNIT(S)/HR: 100 INJECTION, SOLUTION SUBCUTANEOUS at 19:06

## 2021-02-10 RX ADMIN — Medication 5: at 07:39

## 2021-02-10 RX ADMIN — Medication 50 MILLIEQUIVALENT(S): at 10:47

## 2021-02-10 RX ADMIN — SODIUM CHLORIDE 2000 MILLILITER(S): 9 INJECTION INTRAMUSCULAR; INTRAVENOUS; SUBCUTANEOUS at 03:43

## 2021-02-10 RX ADMIN — Medication 1 TABLET(S): at 13:12

## 2021-02-10 RX ADMIN — Medication 10 MILLIGRAM(S): at 00:26

## 2021-02-10 RX ADMIN — DEXMEDETOMIDINE HYDROCHLORIDE IN 0.9% SODIUM CHLORIDE 3.32 MICROGRAM(S)/KG/HR: 4 INJECTION INTRAVENOUS at 23:30

## 2021-02-10 RX ADMIN — Medication 12 UNIT(S): at 17:36

## 2021-02-10 RX ADMIN — HALOPERIDOL DECANOATE 5 MILLIGRAM(S): 100 INJECTION INTRAMUSCULAR at 16:02

## 2021-02-10 RX ADMIN — INSULIN HUMAN 8 UNIT(S): 100 INJECTION, SOLUTION SUBCUTANEOUS at 01:16

## 2021-02-10 RX ADMIN — SODIUM CHLORIDE 2000 MILLILITER(S): 9 INJECTION INTRAMUSCULAR; INTRAVENOUS; SUBCUTANEOUS at 00:26

## 2021-02-10 RX ADMIN — BICTEGRAVIR SODIUM, EMTRICITABINE, AND TENOFOVIR ALAFENAMIDE FUMARATE 1 TABLET(S): 30; 120; 15 TABLET ORAL at 16:08

## 2021-02-10 RX ADMIN — FAMOTIDINE 20 MILLIGRAM(S): 10 INJECTION INTRAVENOUS at 00:26

## 2021-02-10 NOTE — CONSULT NOTE ADULT - SUBJECTIVE AND OBJECTIVE BOX
**obtained from chart, unable to gather history 2/2 to [facility]    This is a 40y F with a PMH of DM and HIV who presents to the ED for nausea and vomiting. Patient recently returned home after extensive rehabilitation and hospital courses following after being found down and hypoglycemic for unknown time in 2019, she never returned back to baseline mental status.  Upon arrival to bedside, patient was seen to be awake but disoriented, unable to follow commands or respond to questions appropriately, repeatedly stating "I do not feel well" and attempting to get out of bed. ICU was consulted for DKA management.     LABS:                       11.9   18.17 )-----------( 194      ( 10 Feb 2021 08:27 )             39.6     02-10    141  |  109<H>  |  14  ----------------------------<  388<H>  4.3   |  9<LL>  |  0.94    Ca    8.9      10 Feb 2021 08:27    TPro  8.1  /  Alb  3.6  /  TBili  0.4  /  DBili  x   /  AST  22  /  ALT  24  /  AlkPhos  143<H>  02-10          ABG - ( 10 Feb 2021 10:52 )  pH, Arterial: x     pH, Blood: 7.31  /  pCO2: 26    /  pO2: 85    / HCO3: 13    / Base Excess: -12.0 /  SaO2: 96        Urinalysis Basic - ( 2021 23:47 )    Color: Yellow / Appearance: Clear / S.020 / pH: x  Gluc: x / Ketone: Large  / Bili: Negative / Urobili: Negative mg/dL   Blood: x / Protein: 30 mg/dL / Nitrite: Negative   Leuk Esterase: Negative / RBC: Negative /HPF / WBC 0-2   Sq Epi: x / Non Sq Epi: Occasional / Bacteria: Occasional    PT/INR - ( 10 Feb 2021 00:26 )   PT: 12.1 sec;   INR: 1.05 ratio      PTT - ( 10 Feb 2021 00:26 )  PTT:43.6 sec    Lactate Trend  02-10 @ 08:27 Lactate:1.9   02-10 @ 03:36 Lactate:2.8   02-10 @ 00:26 Lactate:2.8     CAPILLARY BLOOD GLUCOSE    POCT Blood Glucose.: 288 mg/dL (10 Feb 2021 10:53)  POCT Blood Glucose.: 373 mg/dL (10 Feb 2021 07:39)  POCT Blood Glucose.: 295 mg/dL (10 Feb 2021 02:29)  POCT Blood Glucose.: 449 mg/dL (10 Feb 2021 01:07)  POCT Blood Glucose.: 409 mg/dL (2021 21:57)  POCT Blood Glucose.: 415 mg/dL (2021 21:12)      VITALS:   T(C): 36.3 (02-10-21 @ 10:31), Max: 36.9 (02-10-21 @ 05:49)  HR: 113 (02-10-21 @ 10:31) (109 - 113)  BP: 126/71 (02-10-21 @ 10:31) (126/71 - 142/83)  RR: 20 (02-10-21 @ 10:31) (17 - 20)  SpO2: 100% (02-10-21 @ 10:31) (96% - 100%)    GENERAL: patient repeating "I do not feel well"  HEAD:  Atraumatic, normocephalic  EYES: EOMI, PERRLA, conjunctiva and sclera clear  ENT: Moist mucous membranes  NECK: Supple, no JVD  HEART: Regular rate and rhythm, no murmurs, rubs, or gallops  LUNGS: Unlabored respirations.  Clear to auscultation bilaterally, no crackles, wheezing, or rhonchi  ABDOMEN: Soft, nontender, nondistended, +BS  EXTREMITIES: 2+ peripheral pulses bilaterally. No clubbing, cyanosis, or edema  NERVOUS SYSTEM:  A&Ox3, no focal deficits   SKIN: No rashes or lesions

## 2021-02-10 NOTE — ED ADULT NURSE REASSESSMENT NOTE - NS ED NURSE REASSESS COMMENT FT1
Patient remains stable while in the ED, admitted to telemetry for Intractable vomiting and DKA without coma. No pending stat orders noted. Seen and evaluated by Dr Darden.  Report handoff to ED hold for continuity of care.

## 2021-02-10 NOTE — ED PROVIDER NOTE - CARE PLAN
Principal Discharge DX:	Intractable vomiting with nausea  Secondary Diagnosis:	Altered mental status, unspecified altered mental status type   Principal Discharge DX:	Intractable vomiting with nausea  Secondary Diagnosis:	Diabetic ketoacidosis without coma associated with type 1 diabetes mellitus

## 2021-02-10 NOTE — H&P ADULT - HISTORY OF PRESENT ILLNESS
Patient is a 40F with a PMH of DM2 and HIV on Biktarvy who presents to the ED for nausea and vomiting.  Patient currently asleep but is a poor historian, mother at the bedside to provide history.  Patient has been on her prescribed insulin regimen but mother notes poor PO intake today with multiple episodes of NBNB vomiting last night.    Of note, mother notes that patient recently returned home from [facility]note 3] after being at a rehab center for about a year.  In 12/19, patient was found down and hypoglycemic for an unknown time.  Patient reportedly was in a coma for months and never returned back to baseline mental status.  Patient reportedly has a wobbly gait but walks without assistance devices.  Able to communicate basic needs but unable to string sentences together.    Minor tachycardia in ED.  Labs reveal DKA, leukocytosis, and lactic acidosis.  DKA improved in ED.  Will admit to med surg

## 2021-02-10 NOTE — ED PROVIDER NOTE - PROGRESS NOTE DETAILS
Acute Care - Occupational Therapy Treatment Note   [place]     Patient Name: Thurman Mendel Parsons  : 1948  MRN: [id number]  Today's Date: 2018  Onset of Illness/Injury or Date of Surgery Date: 17  Date of Referral to OT: 17  Referring Physician: Dr. Huber      Admit Date: 2017    Visit Dx:     ICD-10-CM ICD-9-CM   1. Acute exacerbation of chronic obstructive pulmonary disease (COPD) J44.1 491.21   2. Acute on chronic respiratory failure with hypercapnia J96.22 518.84   3. Impaired mobility and ADLs Z74.09 799.89   4. Impaired functional mobility, balance, gait, and endurance Z74.09 V49.89     Patient Active Problem List   Diagnosis   • Anxiety   • Atherosclerotic heart disease of native coronary artery without angina pectoris   • Atrial fibrillation   • Chronic kidney disease, stage III (moderate)   • Steroid-dependent COPD   • Degeneration of cervical intervertebral disc   • Diabetes mellitus   • GERD without esophagitis   • Diverticulosis   • Hemorrhoids   • Hiatal hernia   • Hyperlipidemia   • Hypertension   • Kyphosis, acquired   • Mitral and aortic valve disease   • Phimosis   • Sleep apnea   • Enlarged prostate without lower urinary tract symptoms (luts)   • History of arthritis   • Back pain   • Depression   • Stroke syndrome   • History of ventricular septal defect   • Neck pain   • Impaired mobility and ADLs   • Physical deconditioning   • Acute exacerbation of chronic obstructive pulmonary disease (COPD)             Adult Rehabilitation Note       18 1603 18 1417 18 1416    Rehab Assessment/Intervention    Discipline occupational therapist  -SD occupational therapist  -SD physical therapist  -LM    Document Type therapy note (daily note)  -SD therapy note (daily note)  -SD therapy note (daily note)  -LM    Subjective Information agree to therapy;complains of;pain  -SD agree to therapy;complains of;weakness;dyspnea  -SD agree to therapy;complains  of;weakness;dyspnea  -LM    Patient Effort, Rehab Treatment good  -SD adequate  -SD adequate  -LM    Symptoms Noted During/After Treatment fatigue;shortness of breath  -SD fatigue;shortness of breath  -SD fatigue;shortness of breath  -LM    Precautions/Limitations fall precautions;oxygen therapy device and L/min  -SD fall precautions;oxygen therapy device and L/min  -SD fall precautions;oxygen therapy device and L/min  -LM    Recorded by [SD] Kelle Edwards OT [SD] Kelle Edwards OT [LM] Xochitl Hernandez, PT    Vital Signs    Pre SpO2 (%) 93  -SD 94  -SD 94  -LM    O2 Delivery Pre Treatment supplemental O2   High flow  -SD supplemental O2   High flow  -SD supplemental O2   Hi-marc  -LM    Intra SpO2 (%) 89  -SD      O2 Delivery Intra Treatment supplemental O2   high flow  -SD      Post SpO2 (%) 90  -SD 92  -SD 92  -LM    O2 Delivery Post Treatment supplemental O2   high flow  -SD supplemental O2   high flow  -SD supplemental O2   Hi-marc  -LM    Recorded by [SD] Kelle Edwards OT [SD] Kelle Edwards OT [LM] Xochitl Hernandez, PT    Pain Assessment    Pain Assessment 0-10  -SD 0-10  -SD 0-10  -LM    Pain Score 6  -SD 6  -SD 6  -LM    Post Pain Score 6  -SD 6  -SD 6  -LM    Pain Type Acute pain  -SD Acute pain  -SD Acute pain  -LM    Pain Location Hip  -SD Hip  -SD Hip  -LM    Pain Orientation Left  -SD Left  -SD Left  -LM    Pain Intervention(s) Repositioned  -SD Repositioned;Ambulation/increased activity  -SD Repositioned;Ambulation/increased activity  -LM    Response to Interventions Tolerated  -SD Tolerated  -SD Tolerated.  -LM    Recorded by [SD] Kelle Edwards OT [SD] Kelle Edwards OT [LM] Xochitl Hernandez, PT    Bed Mobility, Assessment/Treatment    Bed Mobility, Assistive Device bed rails;head of bed elevated  -SD  bed rails;head of bed elevated  -LM    Bed Mob, Supine to Sit, [place] supervision required  -SD  supervision required  -LM    Bed Mobility, Impairments   strength decreased;impaired balance;pain   -LM    Recorded by [SD] Kelle Edwards OT  [LM] Xochitl Hernandez, PT    Transfer Assessment/Treatment    Transfers, Bed-Chair [place] contact guard assist  -SD contact guard assist  -SD contact guard assist;hand held assist  -LM    Transfers, Bed-Chair-Bed, Assist Device rolling walker  -SD      Transfers, Sit-Stand [place] contact guard assist  -SD contact guard assist  -SD contact guard assist;hand held assist  -LM    Transfers, Stand-Sit [place] contact guard assist  -SD contact guard assist  -SD contact guard assist;hand held assist  -LM    Transfers, Sit-Stand-Sit, Assist Device rolling walker  -SD      Toilet Transfer, [place]  contact guard assist  -SD contact guard assist  -LM    Transfer, Safety Issues   balance decreased during turns;sequencing ability decreased;step length decreased;weight-shifting ability decreased  -LM    Transfer, Impairments   strength decreased;impaired balance;pain  -LM    Transfer, Comment Patient completed several sit to stand transfers EOB during bathing task using RW  -SD      Recorded by [SD] Kelle Edwards OT [SD] Kelle Edwards OT [LM] Xochitl Hernandez, PT    Gait Assessment/Treatment    Gait, [place] Level   contact guard assist;hand held assist  -LM    Gait, Distance (Feet)   4  -LM    Gait, Gait Deviations   left:;antalgic;forward flexed posture;step length decreased;toe-to-floor clearance decreased  -LM    Gait, Safety Issues   balance decreased during turns;sequencing ability decreased;step length decreased;weight-shifting ability decreased;supplemental O2  -LM    Gait, Impairments   strength decreased;impaired balance;pain  -LM    Recorded by   [LM] Xochitl Hernandez, WILFRED    Functional Mobility    Functional Mobility- Ind. Level contact guard assist  -SD contact guard assist  -SD     Functional Mobility- Device rolling walker  -SD --   HHA  -SD     Functional Mobility-Distance (Feet) 5  -SD 4  -SD     Recorded by [SD] Kelle Edwards OT [SD] Kelle  KASH Edwards     Upper Body Bathing Assessment/Training    UB Bathing Assess/Train, [place] Level minimum assist (75% patient effort)  -SD      Recorded by [SD] Kelle Edwards OT      Lower Body Bathing Assessment/Training    LB Bathing Assess/Train, [place] Level moderate assist (50% patient effort)  -SD      Recorded by [SD] Kelle Edwards OT      Lower Body Dressing Assessment/Training    LB Dressing Assess/Train, [place] minimum assist (75% patient effort)  -SD      Recorded by [SD] Kelle Edwards OT      Toileting Assessment/Training    Toileting Assess/Train, [place] Level  minimum assist (75% patient effort)  -SD     Recorded by  [SD] Kelle Edwards OT     Grooming Assessment/Training    Grooming Assess/Train, [place] Level minimum assist (75% patient effort)  -SD      Grooming Assess/Train, Comment Min A for shaving for safety tp prevent cuts  -SD      Recorded by [SD] Kelle Edwards OT      Therapy Exercises    Bilateral Lower Extremities   AAROM:;10 reps;sitting;ankle pumps/circles;LAQ;other reps   seated march  -    Bilateral Upper Extremity AROM:;15 reps;elbow flexion/extension;shoulder extension/flexion  -SD AROM:;10 reps;elbow flexion/extension;shoulder abduction/adduction;shoulder extension/flexion  -SD     Recorded by [SD] Kelle Edwards OT [SD] Kelle Edwards OT [LM] Xochitl Hernandez, PT    Positioning and Restraints    Pre-Treatment Position in bed  -SD in bed  -SD in bed  -LM    Post Treatment Position chair  -SD chair  -SD chair  -LM    In Chair sitting;call light within reach;encouraged to call for assist  -SD reclined;call light within reach;encouraged to call for assist  -SD reclined;call light within reach;encouraged to call for assist  -LM    Recorded by [SD] Kelle Edwards OT [SD] Kelle Edwards OT [LM] Xochitl Hernandez, PT      01/03/18 1157 01/03/18 1155       Rehab Assessment/Intervention    Discipline occupational therapist  -SD physical therapist  -LM      Document Type therapy note (daily note)  -SD therapy note (daily note)  -LM     Subjective Information agree to therapy;complains of;weakness;pain  -SD agree to therapy;complains of;weakness;fatigue;dyspnea  -LM     Patient Effort, Rehab Treatment adequate  -SD adequate  -LM     Symptoms Noted During/After Treatment fatigue;shortness of breath  -SD fatigue;shortness of breath  -LM     Precautions/Limitations fall precautions;oxygen therapy device and L/min  -SD fall precautions;oxygen therapy device and L/min  -LM     Recorded by [SD] Kelle Edwards OT [LM] Xochitl Hernandez, PT     Vital Signs    Pre SpO2 (%) 92  -SD 92  -LM     O2 Delivery Pre Treatment supplemental O2  -SD supplemental O2   Hi-marc  -LM     Intra SpO2 (%) 87  -SD 87  -LM     O2 Delivery Intra Treatment supplemental O2  -SD supplemental O2   Hi-marc  -LM     Post SpO2 (%) 92  -SD 92  -LM     O2 Delivery Post Treatment supplemental O2  -SD supplemental O2   Hi-marc  -LM     Recorded by [SD] Kelle Edwards OT [LM] Xochitl Hernandez, PT     Pain Assessment    Pain Assessment 0-10  -SD 0-10  -LM     Pain Score 8  -SD 8  -LM     Post Pain Score 8  -SD 8  -LM     Pain Type Acute pain  -SD Acute pain  -LM     Pain Location Hip  -SD Hip  -LM     Pain Orientation Right  -SD Left  -LM     Pain Intervention(s) Repositioned  -SD Repositioned;Ambulation/increased activity  -LM     Response to Interventions Tolerated  -SD Tolerated.  -LM     Recorded by [SD] Kelle Edwards OT [LM] Xochitl Hernandez, PT     Bed Mobility, Assessment/Treatment    Bed Mobility, Assistive Device bed rails;head of bed elevated  -SD bed rails;head of bed elevated  -LM     Bed Mob, Supine to Sit, [place] minimum assist (75% patient effort)  -SD minimum assist (75% patient effort)  -LM     Bed Mobility, Safety Issues  decreased use of legs for bridging/pushing  -LM     Bed Mobility, Impairments  strength decreased;impaired balance;pain  -LM     Recorded by [SD] Kelle Edwards OT [LM] Xochitl  Mary, PT     Transfer Assessment/Treatment    Transfers, Bed-Chair [place]  moderate assist (50% patient effort);hand held assist  -LM     Transfers, Chair-Bed [place] moderate assist (50% patient effort)  -SD      Transfers, Sit-Stand [place] moderate assist (50% patient effort)  -SD moderate assist (50% patient effort);hand held assist  -LM     Transfers, Stand-Sit [place] moderate assist (50% patient effort)  -SD moderate assist (50% patient effort);hand held assist  -LM     Transfer, Safety Issues  balance decreased during turns;sequencing ability decreased;step length decreased;weight-shifting ability decreased  -LM     Transfer, Impairments  strength decreased;impaired balance;pain  -LM     Recorded by [SD] Kelle Edwards OT [LM] Xochitl Hernandez, PT     Gait Assessment/Treatment    Gait, [place] Level  moderate assist (50% patient effort);hand held assist  -LM     Gait, Distance (Feet)  3  -LM     Gait, Gait Pattern Analysis  swing-to gait  -LM     Gait, Gait Deviations  pati decreased;forward flexed posture;step length decreased;toe-to-floor clearance decreased  -LM     Gait, Safety Issues  balance decreased during turns;sequencing ability decreased;step length decreased;weight-shifting ability decreased;supplemental O2  -LM     Gait, Impairments  strength decreased;impaired balance;pain  -LM     Recorded by  [LM] Xochitl Hernandez, PT     Upper Body Bathing Assessment/Training    UB Bathing Assess/Train, [place] Level moderate assist (50% patient effort)  -SD      Recorded by [SD] Kelle Edwards OT      Lower Body Bathing Assessment/Training    LB Bathing Assess/Train, [place] Level maximum assist (25% patient effort)  -SD      Recorded by [SD] Kelle Edwards OT      Lower Body Dressing Assessment/Training    LB Dressing Assess/Train, [place] maximum assist (25% patient effort)  -SD      Recorded by [SD] Kelle Edwards OT      Therapy Exercises    Bilateral Lower  Extremities  AAROM:;15 reps;sitting;ankle pumps/circles;AROM:;LAQ;other reps   seated marches  -LM     Recorded by  [LM] Xochitl Hernandez, PT     Positioning and Restraints    Pre-Treatment Position in bed  -SD in bed  -LM     Post Treatment Position chair  -SD chair  -LM     In Bed  sitting;call light within reach;encouraged to call for assist;with family/caregiver  -LM     In Chair sitting;call light within reach;encouraged to call for assist;with family/caregiver  -SD      Recorded by [SD] Kelle Edwards, OT [LM] Xochitl Hernandez, PT       User Key  (r) = Recorded By, (t) = Taken By, (c) = Cosigned By    Initials Name Effective Dates    LM Xochitl Hernandez, PT 10/26/16 -     SD Kelle Edwards OT 06/30/17 -                 OT Goals       01/05/18 1707 01/04/18 1550 01/03/18 1315    Bed Mobility OT LTG    Bed Mobility OT LTG, Date Goal Reviewed   01/03/18  -SD    Bed Mobility OT LTG, Outcome   goal met  -SD    Transfer Training OT LTG    Transfer Training OT LTG, Date Goal Reviewed  01/04/18  -SD 01/03/18  -SD    Transfer Training OT LTG, Outcome  goal met  -SD goal ongoing  -SD    Strength OT LTG    Strength Goal OT LTG, Date Goal Reviewed 01/05/18  -SD 01/04/18  -SD 01/03/18  -SD    Strength Goal OT LTG, Outcome goal ongoing  -SD goal ongoing  -SD goal ongoing  -SD    LB Dressing OT LTG    LB Dressing Goal OT LTG, Date Goal Reviewed 01/05/18  -SD 01/04/18  -SD 01/03/18  -SD    LB Dressing Goal OT LTG, Outcome goal ongoing  -SD goal ongoing  -SD goal ongoing  -SD    UB Dressing OT LTG    UB Dressing Goal OT LTG, Date Goal Reviewed 01/05/18  -SD 01/04/18  -SD 01/03/18  -SD    UB Dressing Goal OT LTG, Outcome goal ongoing  -SD goal ongoing  -SD goal ongoing  -SD      01/02/18 1519 12/28/17 1404       Bed Mobility OT LTG    Bed Mobility OT LTG, Date Established  12/28/17  -     Bed Mobility OT LTG, Time to Achieve  by discharge  -     Bed Mobility OT LTG, Activity Type  supine to sit/sit to supine  -AH     Bed Mobility OT  LTG, [place] Level  minimum assist (75% patient effort)  -     Bed Mobility OT LTG, Date Goal Reviewed 01/02/18  -      Bed Mobility OT LTG, Outcome goal ongoing  - goal ongoing  -     Transfer Training OT LTG    Transfer Training OT LTG, Date Established  12/28/17  -     Transfer Training OT LTG, Time to Achieve  by discharge  -     Transfer Training OT LTG, Activity Type  sit to stand/stand to sit;bed to chair /chair to bed  -     Transfer Training OT LTG, [place] Level  minimum assist (75% patient effort)  -     Transfer Training OT LTG, Date Goal Reviewed 01/02/18  -      Transfer Training OT LTG, Outcome goal ongoing  - goal ongoing  -     Strength OT LTG    Strength Goal OT LTG, Date Established  12/28/17  -     Strength Goal OT LTG, Time to Achieve  by discharge  -     Strength Goal OT LTG, Functional Goal  Pt will perform 15 reps UE ex progressing from AROM to strengthening as pt tolerates  -     Strength Goal OT LTG, Date Goal Reviewed 01/02/18  -      Strength Goal OT LTG, Outcome goal ongoing  - goal ongoing  -     LB Dressing OT LTG    LB Dressing Goal OT LTG, Date Established  12/28/17  -     LB Dressing Goal OT LTG, Time to Achieve  by discharge  -     LB Dressing Goal OT LTG, [place] Level  moderate assist (50% patient effort)  -     LB Dressing Goal OT LTG, Date Goal Reviewed 01/02/18  -      LB Dressing Goal OT LTG, Outcome goal ongoing  - goal ongoing  -     UB Dressing OT LTG    UB Dressing Goal OT LTG, Date Established  12/28/17  -     UB Dressing Goal OT LTG, Time to Achieve  by discharge  -     UB Dressing Goal OT LTG, [place] Level  minimum assist (75% patient effort)  -     UB Dressing Goal OT LTG, Date Goal Reviewed 01/02/18  -      UB Dressing Goal OT LTG, Outcome goal ongoing  - goal ongoing  -       User Key  (r) = Recorded By, (t) = Taken By, (c) = Cosigned By    Initials Name Provider Type    SOWMAY Gutierrez  Occupational Therapist    TONY Edwards OT Occupational Therapist          Occupational Therapy Education     Title: PT OT SLP Therapies (Active)     Topic: Occupational Therapy (Active)     Point: ADL training (Done)    Description: Instruct learner(s) on proper safety adaptation and remediation techniques during self care or transfers.   Instruct in proper use of assistive devices.    Learning Progress Summary    Learner Readiness Method Response Comment Documented by Status   Patient Acceptance E VU Energy conservation techniques during bathing tasks to prevent dyspnea utilizing PLB techniques. SD 01/05/18 1711 Done    Acceptance E VU Safety/sequencing during functional transfers SD 01/04/18 1553 Done    Acceptance E VU PLB exercises during toileting task utilizing urinal in standing EOB to prevent dyspnea. SD 01/03/18 1319 Done    Acceptance E VU benefit of working with therapy to regain strength  01/02/18 1518 Done    Acceptance E NR   01/02/18 0334 Active    Acceptance E VU Role of OT/POC  12/28/17 1401 Done   Family Acceptance E VU  AE 01/01/18 0103 Done    Acceptance E VU Role of OT/POC  12/28/17 1401 Done               Point: Home exercise program (Done)    Description: Instruct learner(s) on appropriate technique for monitoring, assisting and/or progressing therapeutic exercises/activities.    Learning Progress Summary    Learner Readiness Method Response Comment Documented by Status   Patient Acceptance E VU benefit of working with therapy to regain strength  01/02/18 1518 Done                      User Key     Initials Effective Dates Name Provider Type Discipline     10/26/16 -  Akilah Gutierrez Occupational Therapist OT    SD 06/30/17 -  Kelle Edwards OT Occupational Therapist OT     08/31/17 -  Mayda Nguyen, RN Registered Nurse Nurse     01/20/17 -  Maryjane Subramanian, RN Registered Nurse Nurse                  OT Recommendation and Plan  Anticipated Discharge Disposition:  inpatient rehabilitation facility  Planned Therapy Interventions: ADL retraining, bed mobility training, strengthening, transfer training  Therapy Frequency: 3-5 times/wk  Plan of Care Review  Plan Of Care Reviewed With: patient  Progress: improving  Outcome Summary/Follow up Plan: OT tx completed. Patient performed bed mobility tasks with SBA; functional transfers with CGA-Min A; UB bathing task with CGA; LBB task with min A and grooming task with min A EOB. Patient completed EOB to chair transfer using RW with CGA-Min A. O2 ranged from 93; 89; 90 during tx on high flow O2. Patient continues to present deficits in strength, endurance and balance requiring additional skilled OT services.         Outcome Measures       01/05/18 1603 01/04/18 1417 01/04/18 1416    How much help from another person do you currently need...    Turning from your back to your side while in flat bed without using bedrails?   3  -LM    Moving from lying on back to sitting on the side of a flat bed without bedrails?   3  -LM    Moving to and from a bed to a chair (including a wheelchair)?   3  -LM    Standing up from a chair using your arms (e.g., wheelchair, bedside chair)?   3  -LM    Climbing 3-5 steps with a railing?   1  -LM    To walk in hospital room?   2  -LM    AM-PAC 6 Clicks Score   15  -LM    How much help from another is currently needed...    Putting on and taking off regular lower body clothing? 3  -SD 2  -SD     Bathing (including washing, rinsing, and drying) 3  -SD 2  -SD     Toileting (which includes using toilet bed pan or urinal) 3  -SD 3  -SD     Putting on and taking off regular upper body clothing 3  -SD 2  -SD     Taking care of personal grooming (such as brushing teeth) 3  -SD 3  -SD     Eating meals 4  -SD 3  -SD     Score 19  -SD 15  -SD     Functional Assessment    Outcome Measure Options AM-PAC 6 Clicks Daily Activity (OT)  -SD AM-PAC 6 Clicks Daily Activity (OT)  -SD AM-PAC 6 Clicks Basic Mobility (PT)  -LM       01/03/18 1157 01/03/18 1155       How much help from another person do you currently need...    Turning from your back to your side while in flat bed without using bedrails?  2  -LM     Moving from lying on back to sitting on the side of a flat bed without bedrails?  2  -LM     Moving to and from a bed to a chair (including a wheelchair)?  2  -LM     Standing up from a chair using your arms (e.g., wheelchair, bedside chair)?  2  -LM     Climbing 3-5 steps with a railing?  1  -LM     To walk in hospital room?  1  -LM     AM-PAC 6 Clicks Score  10  -LM     How much help from another is currently needed...    Putting on and taking off regular lower body clothing? 2  -SD      Bathing (including washing, rinsing, and drying) 2  -SD      Toileting (which includes using toilet bed pan or urinal) 2  -SD      Putting on and taking off regular upper body clothing 2  -SD      Taking care of personal grooming (such as brushing teeth) 2  -SD      Eating meals 2  -SD      Score 12  -SD      Functional Assessment    Outcome Measure Options AM-PAC 6 Clicks Daily Activity (OT)  -SD AM-PAC 6 Clicks Basic Mobility (PT)  -LM       User Key  (r) = Recorded By, (t) = Taken By, (c) = Cosigned By    Initials Name Provider Type    FRANCESCO Hernandez, PT Physical Therapist    TONY Edwards OT Occupational Therapist           Time Calculation:         Time Calculation- OT       01/05/18 1713          Time Calculation- OT    OT Start Time 1603  -SD      Total Timed Code Minutes- OT 49 minute(s)  -SD      OT Received On 01/05/18  -SD      OT Goal Re-Cert Due Date 01/07/18  -SD        User Key  (r) = Recorded By, (t) = Taken By, (c) = Cosigned By    Initials Name Provider Type    TONY Edwards OT Occupational Therapist           Therapy Charges for Today     Code Description Service Date Service Provider Modifiers Qty    [id number]  OT SELF CARE/MGMT/TRAIN EA 15 MIN 1/4/2018 Kelle Edwards OT GO 1    [id number]  OT THER PROC EA  15 MIN 1/5/2018 Kelle Edwards, OT GO 1    [id number] HC OT SELF CARE/MGMT/TRAIN EA 15 MIN 1/5/2018 Kelle Edwards OT GO 2               Kelle Edwards OT  1/5/2018   vitals improving, fs improved, caretaker at bedside states pt. is now at baseline mental status

## 2021-02-10 NOTE — H&P ADULT - PROBLEM SELECTOR PLAN 1
AG improved, will continue hydration and monitoring FS  insulin corrective scale   Endo referral outpatient

## 2021-02-10 NOTE — ED PROVIDER NOTE - CLINICAL SUMMARY MEDICAL DECISION MAKING FREE TEXT BOX
41 yo F with intractable vomiting, hyperglycemia, ams, concerning for dka, obstruction, ischemia, doubt acute cerebral event, pregnancy  -basic labs, coags, ketones, hcg, lactate, lipase, abg, ua, cx, finger stick, rvp, CT ab/pel, CT brain, EKG, iv, NS hydration bolus, pepcid/reglan  -f/u results, reeval

## 2021-02-10 NOTE — ED PROVIDER NOTE - OBJECTIVE STATEMENT
41 yo F with abd pain, intractable n/v for 1 day.  Pt. also has hyperglycemia, noted at home over 400.  Caretaker also notes that today she seems to be repeating herself frequently, which is unlike her.  Caretaker attributes this to pain.  No other complaints or inciting event.  Pt. is otherwise well.   ROS: negative for fever, cough, headache, chest pain, shortness of breath, diarrhea, rash, paresthesia, and focal weakness--all other systems reviewed are negative.   PMH: HIV, IDDM; Meds: See EMR for list; SH: Denies smoking/drinking/drug use 39 yo F with abd pain, intractable n/v for 1 day.  Pt. also has hyperglycemia, noted at home over 400.  Caretaker also notes that today she seems to be repeating herself frequently, which is unlike her.  Caretaker attributes this to pain.  No other complaints or inciting event.  Pt. is otherwise well.   ROS: negative for fever, cough, headache, chest pain, shortness of breath, diarrhea, rash, paresthesia, and focal weakness--all other systems reviewed are negative.   PMH: HIV, IDDM, recent admission at [facility] for DKA with coma (AMS ever since); Meds: See EMR for list; SH: Denies smoking/drinking/drug use

## 2021-02-10 NOTE — ED PROVIDER NOTE - PHYSICAL EXAMINATION
Vitals: tachy at 110, htn at 140/78, otherwise WNL  Gen: AAOx3, NAD, sitting uncomfortably in stretcher, actively vomiting bile into basin   Head: ncat, perrla, eomi b/l  Neck: supple, no lymphadenopathy, no midline deviation  Heart: rrr, no m/r/g  Lungs: CTA b/l, no rales/ronchi/wheezes  Abd: soft, diffusely tender, non-distended, no rebound or guarding  Ext: no clubbing/cyanosis/edema  Neuro: sensation and muscle strength intact b/l, steady gait

## 2021-02-10 NOTE — H&P ADULT - NSICDXPASTMEDICALHX_GEN_ALL_CORE_FT
PAST MEDICAL HISTORY:  Diabetes mellitus     Diabetes type I     HIV (human immunodeficiency virus infection)

## 2021-02-10 NOTE — H&P ADULT - NSHPPHYSICALEXAM_GEN_ALL_CORE
Physical exam:  General: patient in no acute distress, resting comfortably  Head:  Atraumatic, Normocephalic  Eyes: EOMI, PERRLA, clear sclera  Neck: Supple, thyroid nontender, non enlarged  Cardio: S1/S2 +ve, regular rate and rhythm, no M/G/R  Resp: clear to ausculation bilaterally, no rales or wheezes  GI: abdomen soft, nontender, non distended, no guarding, BS +ve x 4  Ext: no significant pedal edema  Neuro: no gross motor deficits.  Skin: No rashes or lesions

## 2021-02-10 NOTE — H&P ADULT - NSHPLABSRESULTS_GEN_ALL_CORE
Recent Vitals  T(C): 36.8 (21 @ 21:04), Max: 36.8 (21 @ 21:04)  HR: 110 (02-10-21 @ 03:48) (110 - 110)  BP: 130/83 (02-10-21 @ 03:48) (130/83 - 140/78)  RR: 18 (02-10-21 @ 03:48) (17 - 18)  SpO2: 96% (02-10-21 @ 03:48) (96% - 100%)                        14.1   15.33 )-----------( 217      ( 10 Feb 2021 00:26 )             46.3     02-10    142  |  107  |  14  ----------------------------<  299<H>  4.2   |  18<L>  |  1.11    Ca    9.1      10 Feb 2021 03:36    TPro  9.9<H>  /  Alb  4.3  /  TBili  0.5  /  DBili  x   /  AST  29  /  ALT  28  /  AlkPhos  173<H>  02-10    PT/INR - ( 10 Feb 2021 00:26 )   PT: 12.1 sec;   INR: 1.05 ratio         PTT - ( 10 Feb 2021 00:26 )  PTT:43.6 sec  LIVER FUNCTIONS - ( 10 Feb 2021 00:26 )  Alb: 4.3 g/dL / Pro: 9.9 gm/dL / ALK PHOS: 173 U/L / ALT: 28 U/L / AST: 29 U/L / GGT: x           Urinalysis Basic - ( 2021 23:47 )    Color: Yellow / Appearance: Clear / S.020 / pH: x  Gluc: x / Ketone: Large  / Bili: Negative / Urobili: Negative mg/dL   Blood: x / Protein: 30 mg/dL / Nitrite: Negative   Leuk Esterase: Negative / RBC: Negative /HPF / WBC 0-2   Sq Epi: x / Non Sq Epi: Occasional / Bacteria: Occasional        Home Medications:  insulin regular:  injectable  (12 Sep 2018 17:24)

## 2021-02-11 LAB
4/8 RATIO: 0.27 RATIO — LOW (ref 0.9–3.6)
A1C WITH ESTIMATED AVERAGE GLUCOSE RESULT: 10.8 % — HIGH (ref 4–5.6)
ABS CD8: 931 /UL — HIGH (ref 142–740)
ALBUMIN SERPL ELPH-MCNC: 3.1 G/DL — LOW (ref 3.3–5)
ALP SERPL-CCNC: 125 U/L — HIGH (ref 40–120)
ALT FLD-CCNC: 21 U/L — SIGNIFICANT CHANGE UP (ref 12–78)
ANION GAP SERPL CALC-SCNC: 9 MMOL/L — SIGNIFICANT CHANGE UP (ref 5–17)
AST SERPL-CCNC: 14 U/L — LOW (ref 15–37)
BASOPHILS # BLD AUTO: 0.09 K/UL — SIGNIFICANT CHANGE UP (ref 0–0.2)
BASOPHILS NFR BLD AUTO: 0.6 % — SIGNIFICANT CHANGE UP (ref 0–2)
BILIRUB SERPL-MCNC: 0.5 MG/DL — SIGNIFICANT CHANGE UP (ref 0.2–1.2)
BUN SERPL-MCNC: 6 MG/DL — LOW (ref 7–23)
CALCIUM SERPL-MCNC: 8.3 MG/DL — LOW (ref 8.5–10.1)
CD3 BLASTS SPEC-ACNC: 1263 /UL — SIGNIFICANT CHANGE UP (ref 672–1870)
CD3 BLASTS SPEC-ACNC: 69 % — SIGNIFICANT CHANGE UP (ref 59–83)
CD4 %: 14 % — LOW (ref 30–62)
CD8 %: 51 % — HIGH (ref 12–36)
CHLORIDE SERPL-SCNC: 108 MMOL/L — SIGNIFICANT CHANGE UP (ref 96–108)
CO2 SERPL-SCNC: 19 MMOL/L — LOW (ref 22–31)
CREAT SERPL-MCNC: 0.99 MG/DL — SIGNIFICANT CHANGE UP (ref 0.5–1.3)
CULTURE RESULTS: SIGNIFICANT CHANGE UP
EOSINOPHIL # BLD AUTO: 0.02 K/UL — SIGNIFICANT CHANGE UP (ref 0–0.5)
EOSINOPHIL NFR BLD AUTO: 0.1 % — SIGNIFICANT CHANGE UP (ref 0–6)
ESTIMATED AVERAGE GLUCOSE: 263 MG/DL — HIGH (ref 68–114)
GLUCOSE BLDC GLUCOMTR-MCNC: 115 MG/DL — HIGH (ref 70–99)
GLUCOSE BLDC GLUCOMTR-MCNC: 123 MG/DL — HIGH (ref 70–99)
GLUCOSE BLDC GLUCOMTR-MCNC: 125 MG/DL — HIGH (ref 70–99)
GLUCOSE BLDC GLUCOMTR-MCNC: 127 MG/DL — HIGH (ref 70–99)
GLUCOSE BLDC GLUCOMTR-MCNC: 127 MG/DL — HIGH (ref 70–99)
GLUCOSE BLDC GLUCOMTR-MCNC: 134 MG/DL — HIGH (ref 70–99)
GLUCOSE BLDC GLUCOMTR-MCNC: 141 MG/DL — HIGH (ref 70–99)
GLUCOSE BLDC GLUCOMTR-MCNC: 147 MG/DL — HIGH (ref 70–99)
GLUCOSE BLDC GLUCOMTR-MCNC: 151 MG/DL — HIGH (ref 70–99)
GLUCOSE BLDC GLUCOMTR-MCNC: 158 MG/DL — HIGH (ref 70–99)
GLUCOSE BLDC GLUCOMTR-MCNC: 195 MG/DL — HIGH (ref 70–99)
GLUCOSE BLDC GLUCOMTR-MCNC: 226 MG/DL — HIGH (ref 70–99)
GLUCOSE BLDC GLUCOMTR-MCNC: 279 MG/DL — HIGH (ref 70–99)
GLUCOSE BLDC GLUCOMTR-MCNC: 381 MG/DL — HIGH (ref 70–99)
GLUCOSE SERPL-MCNC: 131 MG/DL — HIGH (ref 70–99)
HCT VFR BLD CALC: 37.8 % — SIGNIFICANT CHANGE UP (ref 34.5–45)
HGB BLD-MCNC: 11.4 G/DL — LOW (ref 11.5–15.5)
IMM GRANULOCYTES NFR BLD AUTO: 0.4 % — SIGNIFICANT CHANGE UP (ref 0–1.5)
LYMPHOCYTES # BLD AUTO: 16.9 % — SIGNIFICANT CHANGE UP (ref 13–44)
LYMPHOCYTES # BLD AUTO: 2.41 K/UL — SIGNIFICANT CHANGE UP (ref 1–3.3)
MAGNESIUM SERPL-MCNC: 2 MG/DL — SIGNIFICANT CHANGE UP (ref 1.6–2.6)
MCHC RBC-ENTMCNC: 23.9 PG — LOW (ref 27–34)
MCHC RBC-ENTMCNC: 30.2 GM/DL — LOW (ref 32–36)
MCV RBC AUTO: 79.2 FL — LOW (ref 80–100)
MONOCYTES # BLD AUTO: 1.11 K/UL — HIGH (ref 0–0.9)
MONOCYTES NFR BLD AUTO: 7.8 % — SIGNIFICANT CHANGE UP (ref 2–14)
NEUTROPHILS # BLD AUTO: 10.58 K/UL — HIGH (ref 1.8–7.4)
NEUTROPHILS NFR BLD AUTO: 74.2 % — SIGNIFICANT CHANGE UP (ref 43–77)
NRBC # BLD: 0 /100 WBCS — SIGNIFICANT CHANGE UP (ref 0–0)
PHOSPHATE SERPL-MCNC: 2.3 MG/DL — LOW (ref 2.5–4.5)
PLATELET # BLD AUTO: 182 K/UL — SIGNIFICANT CHANGE UP (ref 150–400)
POTASSIUM SERPL-MCNC: 4 MMOL/L — SIGNIFICANT CHANGE UP (ref 3.5–5.3)
POTASSIUM SERPL-SCNC: 4 MMOL/L — SIGNIFICANT CHANGE UP (ref 3.5–5.3)
PROT SERPL-MCNC: 7.1 GM/DL — SIGNIFICANT CHANGE UP (ref 6–8.3)
RBC # BLD: 4.77 M/UL — SIGNIFICANT CHANGE UP (ref 3.8–5.2)
RBC # FLD: 14.9 % — HIGH (ref 10.3–14.5)
SODIUM SERPL-SCNC: 136 MMOL/L — SIGNIFICANT CHANGE UP (ref 135–145)
SPECIMEN SOURCE: SIGNIFICANT CHANGE UP
T-CELL CD4 SUBSET PNL BLD: 252 /UL — LOW (ref 489–1457)
WBC # BLD: 14.27 K/UL — HIGH (ref 3.8–10.5)
WBC # FLD AUTO: 14.27 K/UL — HIGH (ref 3.8–10.5)

## 2021-02-11 PROCEDURE — 99233 SBSQ HOSP IP/OBS HIGH 50: CPT

## 2021-02-11 PROCEDURE — 99223 1ST HOSP IP/OBS HIGH 75: CPT

## 2021-02-11 RX ORDER — INSULIN LISPRO 100/ML
VIAL (ML) SUBCUTANEOUS AT BEDTIME
Refills: 0 | Status: DISCONTINUED | OUTPATIENT
Start: 2021-02-11 | End: 2021-02-17

## 2021-02-11 RX ORDER — SODIUM CHLORIDE 9 MG/ML
1000 INJECTION, SOLUTION INTRAVENOUS
Refills: 0 | Status: DISCONTINUED | OUTPATIENT
Start: 2021-02-11 | End: 2021-02-11

## 2021-02-11 RX ORDER — POTASSIUM PHOSPHATE, MONOBASIC POTASSIUM PHOSPHATE, DIBASIC 236; 224 MG/ML; MG/ML
15 INJECTION, SOLUTION INTRAVENOUS ONCE
Refills: 0 | Status: COMPLETED | OUTPATIENT
Start: 2021-02-11 | End: 2021-02-11

## 2021-02-11 RX ORDER — INSULIN LISPRO 100/ML
VIAL (ML) SUBCUTANEOUS
Refills: 0 | Status: DISCONTINUED | OUTPATIENT
Start: 2021-02-11 | End: 2021-02-17

## 2021-02-11 RX ORDER — HALOPERIDOL DECANOATE 100 MG/ML
5 INJECTION INTRAMUSCULAR ONCE
Refills: 0 | Status: COMPLETED | OUTPATIENT
Start: 2021-02-11 | End: 2021-02-11

## 2021-02-11 RX ORDER — QUETIAPINE FUMARATE 200 MG/1
25 TABLET, FILM COATED ORAL
Refills: 0 | Status: DISCONTINUED | OUTPATIENT
Start: 2021-02-11 | End: 2021-02-12

## 2021-02-11 RX ORDER — INSULIN GLARGINE 100 [IU]/ML
10 INJECTION, SOLUTION SUBCUTANEOUS EVERY MORNING
Refills: 0 | Status: DISCONTINUED | OUTPATIENT
Start: 2021-02-11 | End: 2021-02-12

## 2021-02-11 RX ADMIN — QUETIAPINE FUMARATE 25 MILLIGRAM(S): 200 TABLET, FILM COATED ORAL at 23:01

## 2021-02-11 RX ADMIN — ENOXAPARIN SODIUM 40 MILLIGRAM(S): 100 INJECTION SUBCUTANEOUS at 13:02

## 2021-02-11 RX ADMIN — POTASSIUM PHOSPHATE, MONOBASIC POTASSIUM PHOSPHATE, DIBASIC 62.5 MILLIMOLE(S): 236; 224 INJECTION, SOLUTION INTRAVENOUS at 06:30

## 2021-02-11 RX ADMIN — SODIUM CHLORIDE 100 MILLILITER(S): 9 INJECTION, SOLUTION INTRAVENOUS at 10:07

## 2021-02-11 RX ADMIN — Medication 10: at 17:33

## 2021-02-11 RX ADMIN — BICTEGRAVIR SODIUM, EMTRICITABINE, AND TENOFOVIR ALAFENAMIDE FUMARATE 1 TABLET(S): 30; 120; 15 TABLET ORAL at 13:02

## 2021-02-11 RX ADMIN — HALOPERIDOL DECANOATE 5 MILLIGRAM(S): 100 INJECTION INTRAMUSCULAR at 18:46

## 2021-02-11 RX ADMIN — Medication 1 TABLET(S): at 17:33

## 2021-02-11 RX ADMIN — CHLORHEXIDINE GLUCONATE 1 APPLICATION(S): 213 SOLUTION TOPICAL at 06:30

## 2021-02-11 RX ADMIN — POTASSIUM PHOSPHATE, MONOBASIC POTASSIUM PHOSPHATE, DIBASIC 62.5 MILLIMOLE(S): 236; 224 INJECTION, SOLUTION INTRAVENOUS at 01:23

## 2021-02-11 RX ADMIN — INSULIN GLARGINE 10 UNIT(S): 100 INJECTION, SOLUTION SUBCUTANEOUS at 11:28

## 2021-02-11 NOTE — CONSULT NOTE ADULT - SUBJECTIVE AND OBJECTIVE BOX
Neurology consult    DAVISBRIANCHAU [id number]     Patient is a 40y old  Female who presents with a chief complaint of DKA (10 Feb 2021 11:09)      HPI:  "Patient is a 40F with a PMH of DM2 and HIV on Biktarvy who presents to the ED for nausea and vomiting.  Patient currently asleep but is a poor historian, mother at the bedside to provide history.  Patient has been on her prescribed insulin regimen but mother notes poor PO intake today with multiple episodes of NBNB vomiting last night.    Of note, mother notes that patient recently returned home from [facility] after being at a rehab center for about a year.  In , patient was found down and hypoglycemic for an unknown time.  Patient reportedly was in a coma for months and never returned back to baseline mental status.  Patient reportedly has a wobbly gait but walks without assistance devices.  Able to communicate basic needs but unable to string sentences together.    Minor tachycardia in ED.  Labs reveal DKA, leukocytosis, and lactic acidosis.  DKA improved in ED.  Will admit to med surg (10 Feb 2021 04:49)" Patient not answering questions     REVIEW OF SYSTEMS:    see HPI    MEDICATIONS    bictegravir 50 mG/emtricitabine 200 mG/tenofovir alafenamide 25 mG (BIKTARVY) 1 Tablet(s) Oral daily  chlorhexidine 4% Liquid 1 Application(s) Topical <User Schedule>  dexMEDEtomidine Infusion 0.2 MICROgram(s)/kG/Hr IV Continuous <Continuous>  dextrose 40% Gel 15 Gram(s) Oral once  dextrose 5% + lactated ringers. 1000 milliLiter(s) IV Continuous <Continuous>  dextrose 5%. 1000 milliLiter(s) IV Continuous <Continuous>  dextrose 5%. 1000 milliLiter(s) IV Continuous <Continuous>  dextrose 50% Injectable 25 Gram(s) IV Push once  dextrose 50% Injectable 12.5 Gram(s) IV Push once  dextrose 50% Injectable 25 Gram(s) IV Push once  enoxaparin Injectable 40 milliGRAM(s) SubCutaneous daily  glucagon  Injectable 1 milliGRAM(s) IntraMuscular once  insulin glargine Injectable (LANTUS) 10 Unit(s) SubCutaneous every morning  insulin lispro (ADMELOG) corrective regimen sliding scale   SubCutaneous three times a day before meals  insulin lispro (ADMELOG) corrective regimen sliding scale   SubCutaneous at bedtime  insulin regular Infusion 6 Unit(s)/Hr IV Continuous <Continuous>  ondansetron Injectable 4 milliGRAM(s) IV Push every 8 hours PRN  trimethoprim  160 mG/sulfamethoxazole 800 mG 1 Tablet(s) Oral daily      PMH: HIV (human immunodeficiency virus infection)    Diabetes mellitus    Diabetes type I         PSH: No significant past surgical history    No significant past surgical history        FAMILY HISTORY:      SOCIAL HISTORY:  No history of tobacco or alcohol use     Allergies    Certain nail polish (Swelling)  No Known Drug Allergies    Intolerances          Weight (kg): 66.3 (02-10 @ 12:08)    Vital Signs Last 24 Hrs  T(C): 36.8 (2021 09:15), Max: 37.7 (10 Feb 2021 19:09)  T(F): 98.3 (2021 09:15), Max: 99.9 (10 Feb 2021 19:09)  HR: 128 (2021 10:00) (95 - 137)  BP: 131/74 (2021 10:00) (86/63 - 177/89)  BP(mean): 89 (2021 10:00) (59 - 109)  RR: 18 (2021 10:00) (16 - 40)  SpO2: 98% (2021 10:00) (95% - 100%)      On Neurological Examination:    Mental Status - Patient is alert, awake, no verbal output tracks, does not follow commands     Cranial Nerves - PERRL, EOMI, blinks to threat ,  no gross facial asymmetry, tongue/uvula midline    Motor Exam -   B/l upper increased tone, catatonia, waxy flexibility no drift  Right lower normal tone withdraws  Left lower      nml bulk/tone    Sensory    Intact to light touch and pinprick bilaterally    Coord: deferred    Reflexes:          3+ UEs, 2+ Les toes down                                                           LABS:  CBC Full  -  ( 2021 04:41 )  WBC Count : 14.27 K/uL  RBC Count : 4.77 M/uL  Hemoglobin : 11.4 g/dL  Hematocrit : 37.8 %  Platelet Count - Automated : 182 K/uL  Mean Cell Volume : 79.2 fl  Mean Cell Hemoglobin : 23.9 pg  Mean Cell Hemoglobin Concentration : 30.2 gm/dL  Auto Neutrophil # : 10.58 K/uL  Auto Lymphocyte # : 2.41 K/uL  Auto Monocyte # : 1.11 K/uL  Auto Eosinophil # : 0.02 K/uL  Auto Basophil # : 0.09 K/uL  Auto Neutrophil % : 74.2 %  Auto Lymphocyte % : 16.9 %  Auto Monocyte % : 7.8 %  Auto Eosinophil % : 0.1 %  Auto Basophil % : 0.6 %    Urinalysis Basic - ( 2021 23:47 )    Color: Yellow / Appearance: Clear / S.020 / pH: x  Gluc: x / Ketone: Large  / Bili: Negative / Urobili: Negative mg/dL   Blood: x / Protein: 30 mg/dL / Nitrite: Negative   Leuk Esterase: Negative / RBC: Negative /HPF / WBC 0-2   Sq Epi: x / Non Sq Epi: Occasional / Bacteria: Occasional          136  |  108  |  6<L>  ----------------------------<  131<H>  4.0   |  19<L>  |  0.99    Ca    8.3<L>      2021 04:41  Phos  2.3       Mg     2.0         TPro  7.1  /  Alb  3.1<L>  /  TBili  0.5  /  DBili  x   /  AST  14<L>  /  ALT  21  /  AlkPhos  125<H>      LIVER FUNCTIONS - ( 2021 04:41 )  Alb: 3.1 g/dL / Pro: 7.1 gm/dL / ALK PHOS: 125 U/L / ALT: 21 U/L / AST: 14 U/L / GGT: x           Hemoglobin A1C:       PT/INR - ( 10 Feb 2021 00:26 )   PT: 12.1 sec;   INR: 1.05 ratio         PTT - ( 10 Feb 2021 00:26 )  PTT:43.6 sec      RADIOLOGY  CTH < from: CT Head No Cont (02.10.21 @ 02:12) >  IMPRESSION:    No acute intracranial hemorrhage, large cortical infarct or mass effect. If clinically indicated, short-term follow-up or MRI may be obtained for further evaluation.    Nonspecific left mastoid opacification. Recommend clinical correlation to assess acutemastoiditis.            DAVE IBRAHIM MD; Attending Radiologist  This document has been electronically signed. Feb 10 2021  2:38AM    < end of copied text >

## 2021-02-11 NOTE — PROGRESS NOTE ADULT - ASSESSMENT
Pt is a 39 yo BF with h/o DM and HIV on Biktarvy who presents to the ER 2 to N/V found to be in DKA.  Pt recently returned home after extensive rehabilitation and hospital courses following after being found down and hypoglycemic for unknown time in December 2019, she never returned back to baseline mental status.  Upon arrival to bedside, pt was seen to be awake but disoriented, unable to follow commands or respond to questions appropriately, repeatedly stating "I do not feel well" and attempting to get out of bed. Pt admitted to the ICU for management of DKA    ID: Cont pt's Biktarvy   FEN: Pt with increasing Glu; change D5LR to LR/ ADA po diet/ Replace Phos; pt hypophosphatemic   Endo: Transitioned off Insulin drip and adjust Lantus + Lispro to FS  Neuro: Pt may now be at her baseline MS  Social: OOB->chair

## 2021-02-11 NOTE — CONSULT NOTE ADULT - ASSESSMENT
40F with a PMH of DM2 and HIV on Biktarvy who presents to the ED for nausea and vomiting. Found to be in DKA, Neuro consulted for AMS. Pe non verbal tracking, Catatonic, when arms lifted remains up increased tone. 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

## 2021-02-11 NOTE — CONSULT NOTE ADULT - ASSESSMENT
Spoke with pt's mother who reports that Ananda Ortega was admitted to [facility] up until two days ago, where she resided for the last year.  In December, 2019 the pt was found unresponsive on the floor, was in coma for prolonged period of time, PMH of DM type 1 and HIV "diagnosed years ago", on Biktarvy and Bactrim.  Pt's mother does not know what is pt's HIV viral load, states that the rehab has scheduled a follow up apt with a new PCP - the apt did not happen yet. Pt's mother asked me to call the rehab for any further information, I called the rehab at [phone], left a voicemail.   The pt presented to ED s/p N/V at home.   The pt is afebrile since admission, tachycardic, on RA, WBC 14.27, Hgb A1c 10.8, drug screen negative, RVP negative, CT head - ? acute mastoiditis, CT abd/pelvis - ? gastritis, distended bladder, ovarian cysts, ? ovarian torsion - possibly needs pelvic US, low chest - subsegmental atelectasis, partially visualized breast implants. Chest xray is clear.   Blood glucose level was high 472, Anion gap 21 on admission, moderate acetone, admitted to ICU with DKA without coma.     1. HIV  2. Leukocytosis  3. AMS  4. Uncontrolled DM I, Hgb A1c 10.8    Suggestions   - continue Biktarvy  - consider resuming Bactrim   - awaiting for HIV viral load and T-cell subset   - awaiting for a call back from pt's rehab facility to obtain more information  - monitor pt's WBC  - monitor pt's temperatures   - good but not too tight glycemic control  Spoke with pt's mother who reports that Ananda Ortega was admitted to [facility] [place] rehab center up until two days ago, where she resided for the last year.  In December, 2019 the pt was found unresponsive on the floor, was in coma for prolonged period of time, PMH of DM type 1 and HIV "diagnosed years ago", on Biktarvy and Bactrim.  Pt's mother does not know what is pt's HIV viral load, states that the rehab has scheduled a follow up apt with a new PCP - the apt did not happen yet. Pt's mother asked me to call the rehab for any further information, I called the rehab at [phone], left a voicemail.   The pt presented to ED s/p N/V at home.   The pt is afebrile since admission, tachycardic, on RA, WBC 14.27, Hgb A1c 10.8, drug screen negative, RVP negative, CT head - ? acute mastoiditis, CT abd/pelvis - ? gastritis, distended bladder, ovarian cysts, ? ovarian torsion - possibly needs pelvic US, low chest - subsegmental atelectasis, partially visualized breast implants. Chest xray is clear.   Blood glucose level was high 472, Anion gap 21 on admission, moderate acetone, admitted to ICU with DKA without coma.     1. HIV  2. Leukocytosis  3. AMS  4. Uncontrolled DM I, Hgb A1c 10.8  5. DKA    Suggestions   - continue Biktarvy  - resume Bactrim   - send HBV and HCV serology, toxo IgG, crytp ag serum    - EEG and MRI brain   - autoimmune encephalitis panel from serum(may need downtime form to order->follow up with neuro)   - consider transvaginal US to further assess ovarian cyst/lesion  - awaiting for HIV viral load and T-cell subset   - awaiting for a call back from pt's rehab facility to obtain more information  - monitor pt's WBC  - monitor pt's temperatures   - DKA management per ICU and endocrine

## 2021-02-11 NOTE — CONSULT NOTE ADULT - ATTENDING COMMENTS
41 y/o F w/HIV on ART, DM presenting with DKA.    - Admit to ICU  - insulin gtt, endo consult  - Continue ART  - Monitor off abx
Discussed with Yashira Perea NP. I have reviewed all pertinent clinical information, including history, physical exam, plan and the NP's note and agree except as noted.     patient confused, catatonic, repetitive speech   unknown HIV status other then has been positive for at least 6 years   believed to be on Biktarvy (Bictegravir/emtricitibine/tenofavir alafenamide) but unknown VL or tcell   could she have had toxo, or TB meningitis or crypt meningitis?  Her CT (I personally reviewed) has a ovarian lesion->in young females with encephalopathy, one should think of NMDA receptor encephalitis and now that there is a uterine and ovarian abnormality we should pursue this as well  will need more collateral from outpatient facility she was in as well as any previous hospitalizations    Julio Cesar Amado DO  Cell: [phone]  Pager [phone]  Infectious Disease Attending  After 5pm/weekends please call [phone] for all inquiries and new consults   see above for recommendations

## 2021-02-11 NOTE — CONSULT NOTE ADULT - PROBLEM SELECTOR RECOMMENDATION 9
pt remains lethargic  continue insulin drip for now, ok to transition to sq lantus 10units  if more alert and taking po can add lispro 3units with meals   and JENNY   will follow  consider neuro eval re mental status?

## 2021-02-11 NOTE — CONSULT NOTE ADULT - SUBJECTIVE AND OBJECTIVE BOX
Patient is a 40y old  Female who presents with a chief complaint of DKA (11 Feb 2021 15:27)      HPI:  Patient is a 40F with a PMH of DM2 and HIV on Biktarvy who presents to the ED for nausea and vomiting.  Patient currently asleep but is a poor historian, mother at the bedside to provide history.  Patient has been on her prescribed insulin regimen but mother notes poor PO intake today with multiple episodes of NBNB vomiting last night.    Of note, mother notes that patient recently returned home from [facility] after being at a rehab center for about a year.  In 12/19, patient was found down and hypoglycemic for an unknown time.  Patient reportedly was in a coma for months and never returned back to baseline mental status.  Patient reportedly has a wobbly gait but walks without assistance devices.  Able to communicate basic needs but unable to string sentences together.    Minor tachycardia in ED.  Labs reveal DKA, leukocytosis, and lactic acidosis.  DKA improved in ED.(10 Feb 2021 04:49)  endocrine called for glucose control  pt lethargic unable to provide hx  dka resolved      PAST MEDICAL & SURGICAL HISTORY:  HIV (human immunodeficiency virus infection)    Diabetes mellitus    Diabetes type I    No significant past surgical history    No significant past surgical history        Diabetes History:    FAMILY HISTORY: noncontributory        Social History:    Allergies    Certain nail polish (Swelling)  No Known Drug Allergies    Intolerances        MEDICATIONS  (STANDING):  bictegravir 50 mG/emtricitabine 200 mG/tenofovir alafenamide 25 mG (BIKTARVY) 1 Tablet(s) Oral daily  chlorhexidine 4% Liquid 1 Application(s) Topical <User Schedule>  dexMEDEtomidine Infusion 0.2 MICROgram(s)/kG/Hr (3.32 mL/Hr) IV Continuous <Continuous>  dextrose 40% Gel 15 Gram(s) Oral once  dextrose 5% + lactated ringers. 1000 milliLiter(s) (100 mL/Hr) IV Continuous <Continuous>  dextrose 5%. 1000 milliLiter(s) (50 mL/Hr) IV Continuous <Continuous>  dextrose 5%. 1000 milliLiter(s) (100 mL/Hr) IV Continuous <Continuous>  dextrose 50% Injectable 25 Gram(s) IV Push once  dextrose 50% Injectable 12.5 Gram(s) IV Push once  dextrose 50% Injectable 25 Gram(s) IV Push once  enoxaparin Injectable 40 milliGRAM(s) SubCutaneous daily  glucagon  Injectable 1 milliGRAM(s) IntraMuscular once  insulin glargine Injectable (LANTUS) 10 Unit(s) SubCutaneous every morning  insulin lispro (ADMELOG) corrective regimen sliding scale   SubCutaneous three times a day before meals  insulin lispro (ADMELOG) corrective regimen sliding scale   SubCutaneous at bedtime  trimethoprim  160 mG/sulfamethoxazole 800 mG 1 Tablet(s) Oral daily    MEDICATIONS  (PRN):  ondansetron Injectable 4 milliGRAM(s) IV Push every 8 hours PRN Nausea and/or Vomiting      REVIEW OF SYSTEMS:  unobtainable      T(C): 36.7 (02-11-21 @ 15:16), Max: 37.7 (02-10-21 @ 19:09)  HR: 120 (02-11-21 @ 15:00) (95 - 137)  BP: 169/127 (02-11-21 @ 15:00) (98/45 - 177/89)  RR: 22 (02-11-21 @ 15:00) (14 - 40)  SpO2: 98% (02-11-21 @ 15:00) (95% - 100%)  Wt(kg): --    PHYSICAL EXAM:  GENERAL: NAD, well-groomed, well-developed  HEAD:  Atraumatic, Normocephalic  EYES: conjunctiva and sclera clear  CHEST/LUNG: Clear to percussion bilaterally; No rales, rhonchi, wheezing, or rubs  HEART: Regular rate and rhythm; No murmurs, rubs, or gallops  ABDOMEN: Soft, Nontender, Nondistended; Bowel sounds present      CAPILLARY BLOOD GLUCOSE      POCT Blood Glucose.: 279 mg/dL (11 Feb 2021 12:55)  POCT Blood Glucose.: 141 mg/dL (11 Feb 2021 11:33)  POCT Blood Glucose.: 125 mg/dL (11 Feb 2021 10:02)  POCT Blood Glucose.: 151 mg/dL (11 Feb 2021 08:56)  POCT Blood Glucose.: 127 mg/dL (11 Feb 2021 07:11)  POCT Blood Glucose.: 123 mg/dL (11 Feb 2021 06:20)  POCT Blood Glucose.: 115 mg/dL (11 Feb 2021 05:21)  POCT Blood Glucose.: 127 mg/dL (11 Feb 2021 04:13)  POCT Blood Glucose.: 147 mg/dL (11 Feb 2021 02:55)  POCT Blood Glucose.: 158 mg/dL (11 Feb 2021 02:07)  POCT Blood Glucose.: 226 mg/dL (11 Feb 2021 01:04)  POCT Blood Glucose.: 134 mg/dL (11 Feb 2021 00:05)  POCT Blood Glucose.: 75 mg/dL (10 Feb 2021 23:08)  POCT Blood Glucose.: 74 mg/dL (10 Feb 2021 22:08)  POCT Blood Glucose.: 128 mg/dL (10 Feb 2021 21:04)  POCT Blood Glucose.: 180 mg/dL (10 Feb 2021 20:12)  POCT Blood Glucose.: 293 mg/dL (10 Feb 2021 18:59)  POCT Blood Glucose.: 354 mg/dL (10 Feb 2021 18:12)                            11.4   14.27 )-----------( 182      ( 11 Feb 2021 04:41 )             37.8       CMP:  02-11 @ 04:41  SGPT 21  Albumin 3.1   Alk Phos 125   Anion Gap 9   SGOT 14   Total Bili 0.5   BUN 6   Calcium Total 8.3   CO2 19   Chloride 108   Creatinine 0.99   eGFR if AA 84   eGFR if non AA 72   Glucose 131   Potassium 4.0   Protein 7.1   Sodium 136      Thyroid Function Tests:  02-10 @ 00:26 TSH 2.930 FreeT4 -- T3 -- Anti TPO -- Anti Thyroglobulin Ab -- TSI --      Diabetes Tests:     Parathyroids:     Adrenals:       Radiology:

## 2021-02-11 NOTE — CONSULT NOTE ADULT - SUBJECTIVE AND OBJECTIVE BOX
Patient is a 40y old  Female who presents with a chief complaint of DKA (2021 10:47)      HPI:  Patient is a 40F with a PMH of DM2 and HIV on Biktarvy who presents to the ED for nausea and vomiting.  Patient currently asleep but is a poor historian, mother at the bedside to provide history.  Patient has been on her prescribed insulin regimen but mother notes poor PO intake today with multiple episodes of NBNB vomiting last night.    Of note, mother notes that patient recently returned home from [facility] after being at a rehab center for about a year.  In , patient was found down and hypoglycemic for an unknown time.  Patient reportedly was in a coma for months and never returned back to baseline mental status.  Patient reportedly has a wobbly gait but walks without assistance devices.  Able to communicate basic needs but unable to string sentences together.    Minor tachycardia in ED.  Labs reveal DKA, leukocytosis, and lactic acidosis.  DKA improved in ED.  Will admit to med surg (10 Feb 2021 04:49)    ED HPI reviewed  Spoke with pt's mother who reports that Ananda Ortega was admitted to [facility] up until two days ago, where she resided for the last year.  In  the pt was found unresponsive on the floor, was in coma for prolonged period of time, PMH of DM type 1 and HIV "diagnosed years ago", on Biktarvy and Bactrim.  Pt's mother does not know what is pt's HIV viral load, states that the rehab has scheduled a follow up apt with a new PCP - the apt did not happen yet. Pt's mother asked me to call the rehab for any further information, I called the rehab at [phone], left a voicemail.   The pt is afebrile since admission, tachycardic, on RA, WBC 14.27, Hgb A1c 10.8, drug screen negative, RVP negative, CT head - ? acute mastoiditis, CT abd/pelvis - ? gastritis, distended bladder, ovarian cysts, ? ovarian torsion - possibly needs pelvic US, low chest - subsegmental atelectasis, partially visualized breast implants. Chest xray is clear.   Blood glucose level was high 472, Anion gap 21 on admission, moderate acetone, admitted to ICU with DKA without coma.   ID consulted for workup and antimicrobials management     PAST MEDICAL & SURGICAL HISTORY:  HIV (human immunodeficiency virus infection)    Diabetes type I    No significant past surgical history  back surgery - as per mothe    Allergies  Certain nail polish (Swelling)  No Known Drug Allergies        ANTIMICROBIALS:  bictegravir 50 mG/emtricitabine 200 mG/tenofovir alafenamide 25 mG (BIKTARVY) 1 daily  trimethoprim  160 mG/sulfamethoxazole 800 mG 1 daily      MEDICATIONS  (STANDING):  bictegravir 50 mG/emtricitabine 200 mG/tenofovir alafenamide 25 mG (BIKTARVY)   1 Tablet(s) Oral (21 @ 13:02)   1 Tablet(s) Oral (02-10-21 @ 16:08)    trimethoprim  160 mG/sulfamethoxazole 800 mG   1 Tablet(s) Oral (02-10-21 @ 13:12)        OTHER MEDS: MEDICATIONS  (STANDING):  dexMEDEtomidine Infusion 0.2 <Continuous>  dextrose 40% Gel 15 once  dextrose 50% Injectable 25 once  dextrose 50% Injectable 12.5 once  dextrose 50% Injectable 25 once  enoxaparin Injectable 40 daily  glucagon  Injectable 1 once  insulin glargine Injectable (LANTUS) 10 every morning  insulin lispro (ADMELOG) corrective regimen sliding scale  three times a day before meals  insulin lispro (ADMELOG) corrective regimen sliding scale  at bedtime  ondansetron Injectable 4 every 8 hours PRN      SOCIAL HISTORY:     unknown as per pt's mother     FAMILY HISTORY:  as per mother - mother and father are healthy       REVIEW OF SYSTEMS  [ x ] ROS unobtainable because:  pt is non-verbal     Vital Signs Last 24 Hrs  T(F): 98.6 (21 @ 14:00), Max: 99.9 (02-10-21 @ 19:09)    Vital Signs Last 24 Hrs  HR: 120 (21 @ 15:00) (95 - 137)  BP: 169/127 (21 @ 15:00) (98/45 - 177/89)  RR: 22 (21 @ 15:00)  SpO2: 98% (21 @ 15:00) (95% - 100%)  Wt(kg): --    PHYSICAL EXAM:  Constitutional: non-toxic, no distress, on RA  HEAD/EYES: anicteric, no conjunctival injection  ENT:  neck supple, unable to assess pt's mouth due to non-compliance   Cardiovascular:   tachycardic, no murmur, no edema  Respiratory:  clear BS bilaterally, no wheezes, no rales  GI:  soft, non-tender, normal bowel sounds  :  no cordero, no CVA tenderness  Musculoskeletal:  no synovitis, normal ROM  Neurologic: awake and alert, normal strength, follows only some of my commands   Skin:  no rash, no erythema, no phlebitis  Heme/Onc: no lymphadenopathy   Psychiatric:  awake, alert, unable to assess           WBC Count: 14.27 K/uL ( @ 04:41)  WBC Count: 20.79 K/uL (02-10 @ 14:46)  WBC Count: 18.17 K/uL (02-10 @ 08:27)  WBC Count: 15.33 K/uL (02-10 @ 00:26)                            11.4   14.27 )-----------( 182      ( 2021 04:41 )             37.8           136  |  108  |  6<L>  ----------------------------<  131<H>  4.0   |  19<L>  |  0.99    Ca    8.3<L>      2021 04:41  Phos  2.3       Mg     2.0         TPro  7.1  /  Alb  3.1<L>  /  TBili  0.5  /  DBili  x   /  AST  14<L>  /  ALT  21  /  AlkPhos  125<H>        Creatinine Trend: 0.99<--, 1.00<--, 1.00<--, 0.94<--, 1.11<--, 1.16<--    Urinalysis Basic - ( 2021 23:47 )    Color: Yellow / Appearance: Clear / S.020 / pH: x  Gluc: x / Ketone: Large  / Bili: Negative / Urobili: Negative mg/dL   Blood: x / Protein: 30 mg/dL / Nitrite: Negative   Leuk Esterase: Negative / RBC: Negative /HPF / WBC 0-2   Sq Epi: x / Non Sq Epi: Occasional / Bacteria: Occasional        MICROBIOLOGY:    Culture - Urine (collected 02-10-21 @ 09:01)  Source: .Urine Clean Catch (Midstream)  Final Report (21 @ 08:20):    <10,000 CFU/mL Normal Urogenital Ashlee            v    Rapid RVP Result: NotDete ( @ 23:50)  Respiratory Viral Panel with COVID-19 by SALMA (21 @ 23:50)    Rapid RVP Result: [facility]    SARS-CoV-2: NotDete: This Respiratory Panel uses polymerase chain reaction (PCR) to detect for  adenovirus; coronavirus (HKU1, NL63, 229E, OC43); human metapneumovirus  (hMPV); human enterovirus/rhinovirus (Entero/RV); influenza A; influenza  A/H1; influenza A/H3; influenza A/H1-2009; influenza B; parainfluenza  viruses 1, 2, 3, 4; respiratory syncytial virus; Mycoplasma pneumoniae;  Chlamydophila pneumoniae; and SARS-CoV-2.      RADIOLOGY:    < from: CT Abdomen and Pelvis w/ IV Cont (02.10.21 @ 02:13) >  EXAM:  CT ABDOMEN AND PELVIS IC                            PROCEDURE DATE:  02/10/2021          INTERPRETATION:  CLINICAL INFORMATION: HIV, DM, altered mental status, intractable nausea and vomiting for one day.    PROCEDURE:  Helical axial images were obtained from the domes of the diaphragm through the pubic symphysis following the administration of intravenous contrast. 90 mls of Omnipaque-350 administered without complication. 10 ml(s) discarded. Coronal and sagittal reformats were also obtained.    COMPARISON: None.    FINDINGS: Artifact from patient's arms and respiratory motion degrades images.    LOWER CHEST: Subsegmental atelectasis. Partially visualized bilateral breast implants.    LIVER: Degraded by artifact. Focal fatty infiltration near the falciform ligament.  GALLBLADDER/BILE DUCTS: No intrahepatic or extrahepatic biliary dilatation. No significant gallbladder edema.  PANCREAS: Unremarkable.  SPLEEN: Degraded by artifact. Grossly unremarkable.    ADRENALS: Unremarkable.  KIDNEYS/URETERS: No hydronephrosis, hydroureter or significant perinephric stranding.  BLADDER: Distended.  REPRODUCTIVE ORGANS: A 1.8 x 1.4 cm enhancing area along the left posterior uterus, raising a question of fibroid. Asymmetrically enlarged, right ovary with enhancing structures measuring 2.0 x 1.7 cm and 1.0 x 1.0 cm. Unremarkable left adnexa.    BOWEL: No bowel obstruction. Small tubular structure along the medial aspect of the cecum (2:67), suggesting a normal appendix. No secondary signs of appendicitis. A moderate amount of stool throughout the colon and rectum. Prominent wall of the distal stomach.  PERITONEUM: No drainable fluid collection or free air.  VESSELS: Normal caliber of the abdominal aorta.  RETROPERITONEUM: No lymphadenopathy.  ABDOMINAL WALL/SOFT TISSUES: Unremarkable.  BONES: Degenerative changes of the spine. Nonspecific small sclerotic foci in the pelvic bones.    IMPRESSION:    Prominent distal gastric wall, which may be due to partial distention. Recommend clinical correlation to assess gastritis.  Follow-up endoscopy to be obtained for further evaluation.    Distended urinary bladder. Recommend clinical correlation to assess urinary retention.    Question uterine fibroid. Asymmetrically enlarged, right ovary with suggestion of corpus luteal/complex cysts. Recommend clinical correlation to assess for ovarian torsion. If clinically indicated, pelvic ultrasound may be obtained for further evaluation.    Additional findings as described.    DAVE IBRAHIM MD; Attending Radiologist  This document has been electronically signed. Feb 10 2021  2:35AM    < end of copied text >    < from: CT Head No Cont (02.10.21 @ 02:12) >    EXAM:  CT BRAIN                            PROCEDURE DATE:  02/10/2021          INTERPRETATION:  CLINICAL INDICATION: HIV, DM, altered mental status, intractable nausea and vomiting for one day.    TECHNIQUE: CT axial images of the head were obtained without intravenous contrast. Computer-reconstructed coronal and sagittal images were obtained.    COMPARISON: None.    FINDINGS: There is no acute intracranial hemorrhage, large cortical infarct, mass effect or midline shift. There is mild cerebral volume loss with ventricular dilatation.    There is no depressed skull fracture. There is minimal sinus mucosal thickening. The right tympanomastoid region is clear. Nonspecific left mastoid opacification. Prominent adenoids.    IMPRESSION:    No acute intracranial hemorrhage, large cortical infarct or mass effect. If clinically indicated, short-term follow-up or MRI may be obtained for further evaluation.    Nonspecific left mastoid opacification. Recommend clinical correlation to assess acutemastoiditis.    DAVE IBRAHIM MD; Attending Radiologist  This document has been electronically signed. Feb 10 2021  2:38AM    < end of copied text >    < from: Xray Chest 2 Views PA/Lat (18 @ 21:01) >  EXAM:  XR CHEST PA LAT 2V        PROCEDURE DATE:  Blaise  2 2018         INTERPRETATION:  Clinical information: Hypoglycemia and leukocytosis.    Technique: PA and lateral radiograph of the chest.    Comparison: None available.    Lungs:    Lungs are clear. There is no pleural effusion or pneumothorax. Heart size   is within normal limits. Imaged skeletal structures are unremarkable.    Impression:    Clear lungs.      NOBLE FELIPE M.D., RADIOLOGY RESIDENT  This document has been electronically signed.  KAYLA LIMA M.D., ATTENDING RADIOLOGIST  This document has been electronically signed. Blaise  3 2018 11:12AM    < end of copied text >       Patient is a 40y old  Female who presents with a chief complaint of DKA (2021 10:47)      HPI:  Patient is a 40F with a PMH of DM2 and HIV on Biktarvy who presents to the ED for nausea and vomiting.  Patient currently asleep but is a poor historian, mother at the bedside to provide history.  Patient has been on her prescribed insulin regimen but mother notes poor PO intake today with multiple episodes of NBNB vomiting last night.    Of note, mother notes that patient recently returned home from [facility] after being at a rehab center for about a year.  In , patient was found down and hypoglycemic for an unknown time.  Patient reportedly was in a coma for months and never returned back to baseline mental status.  Patient reportedly has a wobbly gait but walks without assistance devices.  Able to communicate basic needs but unable to string sentences together.    Minor tachycardia in ED.  Labs reveal DKA, leukocytosis, and lactic acidosis.  DKA improved in ED.  Will admit to med surg (10 Feb 2021 04:49)    ED HPI reviewed  Spoke with pt's mother who reports that Ananda Ortega was admitted to [facility] up until two days ago, where she resided for the last year.  In  the pt was found unresponsive on the floor, was in coma for prolonged period of time, PMH of DM type 1 and HIV "diagnosed years ago", on Biktarvy and Bactrim.  Pt's mother does not know what is pt's HIV viral load, states that the rehab has scheduled a follow up apt with a new PCP - the apt did not happen yet. Pt's mother asked me to call the rehab for any further information, I called the rehab at [phone], left a voicemail.   The pt is afebrile since admission, tachycardic, on RA, WBC 14.27, Hgb A1c 10.8, drug screen negative, RVP negative, CT head - ? acute mastoiditis, CT abd/pelvis - ? gastritis, distended bladder, ovarian cysts, ? ovarian torsion - possibly needs pelvic US, low chest - subsegmental atelectasis, partially visualized breast implants. Chest xray is clear.   Blood glucose level was high 472, Anion gap 21 on admission, moderate acetone, admitted to ICU with DKA without coma.   ID consulted for workup and antimicrobials management     PAST MEDICAL & SURGICAL HISTORY:  HIV (human immunodeficiency virus infection)    Diabetes type I    No significant past surgical history  back surgery - as per mothe    Allergies  Certain nail polish (Swelling)  No Known Drug Allergies        ANTIMICROBIALS:  bictegravir 50 mG/emtricitabine 200 mG/tenofovir alafenamide 25 mG (BIKTARVY) 1 daily  trimethoprim  160 mG/sulfamethoxazole 800 mG 1 daily      MEDICATIONS  (STANDING):  bictegravir 50 mG/emtricitabine 200 mG/tenofovir alafenamide 25 mG (BIKTARVY)   1 Tablet(s) Oral (21 @ 13:02)   1 Tablet(s) Oral (02-10-21 @ 16:08)    trimethoprim  160 mG/sulfamethoxazole 800 mG   1 Tablet(s) Oral (02-10-21 @ 13:12)        OTHER MEDS: MEDICATIONS  (STANDING):  dexMEDEtomidine Infusion 0.2 <Continuous>  dextrose 40% Gel 15 once  dextrose 50% Injectable 25 once  dextrose 50% Injectable 12.5 once  dextrose 50% Injectable 25 once  enoxaparin Injectable 40 daily  glucagon  Injectable 1 once  insulin glargine Injectable (LANTUS) 10 every morning  insulin lispro (ADMELOG) corrective regimen sliding scale  three times a day before meals  insulin lispro (ADMELOG) corrective regimen sliding scale  at bedtime  ondansetron Injectable 4 every 8 hours PRN      SOCIAL HISTORY:     unknown as per pt's mother     FAMILY HISTORY:  as per mother - mother and father are healthy       REVIEW OF SYSTEMS  [ x ] ROS unobtainable because:  pt is non-verbal     Vital Signs Last 24 Hrs  T(F): 98.6 (21 @ 14:00), Max: 99.9 (02-10-21 @ 19:09)    Vital Signs Last 24 Hrs  HR: 120 (21 @ 15:00) (95 - 137)  BP: 169/127 (21 @ 15:00) (98/45 - 177/89)  RR: 22 (21 @ 15:00)  SpO2: 98% (21 @ 15:00) (95% - 100%)  Wt(kg): --    PHYSICAL EXAM:  Constitutional: non-toxic, no distress, on RA  HEAD/EYES: anicteric, no conjunctival injection  ENT:  neck supple, unable to assess pt's mouth due to non-compliance   Cardiovascular:   tachycardic, no murmur, no edema  Respiratory:  clear BS bilaterally, no wheezes, no rales  GI:  soft, non-tender, normal bowel sounds  :  no cordero, no CVA tenderness  Musculoskeletal:  no synovitis, normal ROM  Neurologic: awake and alert, normal strength, follows only some of my commands   Skin:  no rash, no erythema, no phlebitis  Heme/Onc: no lymphadenopathy   Psychiatric:  awake, alert, unable to assess           WBC Count: 14.27 K/uL ( @ 04:41)  WBC Count: 20.79 K/uL (02-10 @ 14:46)  WBC Count: 18.17 K/uL (02-10 @ 08:27)  WBC Count: 15.33 K/uL (02-10 @ 00:26)                            11.4   14.27 )-----------( 182      ( 2021 04:41 )             37.8           136  |  108  |  6<L>  ----------------------------<  131<H>  4.0   |  19<L>  |  0.99    Ca    8.3<L>      2021 04:41  Phos  2.3       Mg     2.0         TPro  7.1  /  Alb  3.1<L>  /  TBili  0.5  /  DBili  x   /  AST  14<L>  /  ALT  21  /  AlkPhos  125<H>        Creatinine Trend: 0.99<--, 1.00<--, 1.00<--, 0.94<--, 1.11<--, 1.16<--    Urinalysis Basic - ( 2021 23:47 )    Color: Yellow / Appearance: Clear / S.020 / pH: x  Gluc: x / Ketone: Large  / Bili: Negative / Urobili: Negative mg/dL   Blood: x / Protein: 30 mg/dL / Nitrite: Negative   Leuk Esterase: Negative / RBC: Negative /HPF / WBC 0-2   Sq Epi: x / Non Sq Epi: Occasional / Bacteria: Occasional        MICROBIOLOGY:    Culture - Urine (collected 02-10-21 @ 09:01)  Source: .Urine Clean Catch (Midstream)  Final Report (21 @ 08:20):    <10,000 CFU/mL Normal Urogenital Ashlee    Rapid RVP Result: NotDetec ( @ 23:50)  Respiratory Viral Panel with COVID-19 by SALMA (21 @ 23:50)    Rapid RVP Result: [facility]    SARS-CoV-2: NotDete: This Respiratory Panel uses polymerase chain reaction (PCR) to detect for  adenovirus; coronavirus (HKU1, NL63, 229E, OC43); human metapneumovirus  (hMPV); human enterovirus/rhinovirus (Entero/RV); influenza A; influenza  A/H1; influenza A/H3; influenza A/H1-2009; influenza B; parainfluenza  viruses 1, 2, 3, 4; respiratory syncytial virus; Mycoplasma pneumoniae;  Chlamydophila pneumoniae; and SARS-CoV-2.      RADIOLOGY:  CT Abdomen and Pelvis w/ IV Cont (02.10.21 @ 02:13)  EXAM:  CT ABDOMEN AND PELVIS IC                            PROCEDURE DATE:  02/10/2021          INTERPRETATION:  CLINICAL INFORMATION: HIV, DM, altered mental status, intractable nausea and vomiting for one day.    PROCEDURE:  Helical axial images were obtained from the domes of the diaphragm through the pubic symphysis following the administration of intravenous contrast. 90 mls of Omnipaque-350 administered without complication. 10 ml(s) discarded. Coronal and sagittal reformats were also obtained.    COMPARISON: None.    FINDINGS: Artifact from patient's arms and respiratory motion degrades images.    LOWER CHEST: Subsegmental atelectasis. Partially visualized bilateral breast implants.    LIVER: Degraded by artifact. Focal fatty infiltration near the falciform ligament.  GALLBLADDER/BILE DUCTS: No intrahepatic or extrahepatic biliary dilatation. No significant gallbladder edema.  PANCREAS: Unremarkable.  SPLEEN: Degraded by artifact. Grossly unremarkable.    ADRENALS: Unremarkable.  KIDNEYS/URETERS: No hydronephrosis, hydroureter or significant perinephric stranding.  BLADDER: Distended.  REPRODUCTIVE ORGANS: A 1.8 x 1.4 cm enhancing area along the left posterior uterus, raising a question of fibroid. Asymmetrically enlarged, right ovary with enhancing structures measuring 2.0 x 1.7 cm and 1.0 x 1.0 cm. Unremarkable left adnexa.    BOWEL: No bowel obstruction. Small tubular structure along the medial aspect of the cecum (2:67), suggesting a normal appendix. No secondary signs of appendicitis. A moderate amount of stool throughout the colon and rectum. Prominent wall of the distal stomach.  PERITONEUM: No drainable fluid collection or free air.  VESSELS: Normal caliber of the abdominal aorta.  RETROPERITONEUM: No lymphadenopathy.  ABDOMINAL WALL/SOFT TISSUES: Unremarkable.  BONES: Degenerative changes of the spine. Nonspecific small sclerotic foci in the pelvic bones.    IMPRESSION:    Prominent distal gastric wall, which may be due to partial distention. Recommend clinical correlation to assess gastritis.  Follow-up endoscopy to be obtained for further evaluation.    Distended urinary bladder. Recommend clinical correlation to assess urinary retention.    Question uterine fibroid. Asymmetrically enlarged, right ovary with suggestion of corpus luteal/complex cysts. Recommend clinical correlation to assess for ovarian torsion. If clinically indicated, pelvic ultrasound may be obtained for further evaluation.    Additional findings as described.    DAVE IBRAHIM MD; Attending Radiologist  This document has been electronically signed. Feb 10 2021  2:35AM      CT Head No Cont (02.10.21 @ 02:12)    EXAM:  CT BRAIN                            PROCEDURE DATE:  02/10/2021          INTERPRETATION:  CLINICAL INDICATION: HIV, DM, altered mental status, intractable nausea and vomiting for one day.    TECHNIQUE: CT axial images of the head were obtained without intravenous contrast. Computer-reconstructed coronal and sagittal images were obtained.    COMPARISON: None.    FINDINGS: There is no acute intracranial hemorrhage, large cortical infarct, mass effect or midline shift. There is mild cerebral volume loss with ventricular dilatation.    There is no depressed skull fracture. There is minimal sinus mucosal thickening. The right tympanomastoid region is clear. Nonspecific left mastoid opacification. Prominent adenoids.    IMPRESSION:    No acute intracranial hemorrhage, large cortical infarct or mass effect. If clinically indicated, short-term follow-up or MRI may be obtained for further evaluation.    Nonspecific left mastoid opacification. Recommend clinical correlation to assess acutemastoiditis.    DAVE IBRAHIM MD; Attending Radiologist  This document has been electronically signed. Feb 10 2021  2:38AM    < end of copied text >    < from: Xray Chest 2 Views PA/Lat (18 @ 21:01) >  EXAM:  XR CHEST PA LAT 2V        PROCEDURE DATE:  2018         INTERPRETATION:  Clinical information: Hypoglycemia and leukocytosis.    Technique: PA and lateral radiograph of the chest.    Comparison: None available.    Lungs:    Lungs are clear. There is no pleural effusion or pneumothorax. Heart size   is within normal limits. Imaged skeletal structures are unremarkable.    Impression:    Clear lungs.      NOBLE FELIPE M.D., RADIOLOGY RESIDENT  This document has been electronically signed.  KAYLA LIMA M.D., ATTENDING RADIOLOGIST  This document has been electronically signed. Blaise  3 2018 11:12AM

## 2021-02-12 LAB
A1C WITH ESTIMATED AVERAGE GLUCOSE RESULT: 11.1 % — HIGH (ref 4–5.6)
ALBUMIN SERPL ELPH-MCNC: 2.8 G/DL — LOW (ref 3.3–5)
ALP SERPL-CCNC: 118 U/L — SIGNIFICANT CHANGE UP (ref 40–120)
ALT FLD-CCNC: 20 U/L — SIGNIFICANT CHANGE UP (ref 12–78)
ANION GAP SERPL CALC-SCNC: 17 MMOL/L — SIGNIFICANT CHANGE UP (ref 5–17)
AST SERPL-CCNC: 16 U/L — SIGNIFICANT CHANGE UP (ref 15–37)
BILIRUB SERPL-MCNC: 0.7 MG/DL — SIGNIFICANT CHANGE UP (ref 0.2–1.2)
BUN SERPL-MCNC: 3 MG/DL — LOW (ref 7–23)
CALCIUM SERPL-MCNC: 8.2 MG/DL — LOW (ref 8.5–10.1)
CHLORIDE SERPL-SCNC: 100 MMOL/L — SIGNIFICANT CHANGE UP (ref 96–108)
CO2 SERPL-SCNC: 17 MMOL/L — LOW (ref 22–31)
CREAT SERPL-MCNC: 0.98 MG/DL — SIGNIFICANT CHANGE UP (ref 0.5–1.3)
ESTIMATED AVERAGE GLUCOSE: 272 MG/DL — HIGH (ref 68–114)
GLUCOSE BLDC GLUCOMTR-MCNC: 123 MG/DL — HIGH (ref 70–99)
GLUCOSE BLDC GLUCOMTR-MCNC: 229 MG/DL — HIGH (ref 70–99)
GLUCOSE BLDC GLUCOMTR-MCNC: 238 MG/DL — HIGH (ref 70–99)
GLUCOSE BLDC GLUCOMTR-MCNC: 335 MG/DL — HIGH (ref 70–99)
GLUCOSE SERPL-MCNC: 294 MG/DL — HIGH (ref 70–99)
HBV CORE AB SER-ACNC: SIGNIFICANT CHANGE UP
HBV SURFACE AB SER-ACNC: SIGNIFICANT CHANGE UP
HBV SURFACE AG SER-ACNC: SIGNIFICANT CHANGE UP
HCT VFR BLD CALC: 36.8 % — SIGNIFICANT CHANGE UP (ref 34.5–45)
HCV AB S/CO SERPL IA: 0.18 S/CO — SIGNIFICANT CHANGE UP (ref 0–0.99)
HCV AB SERPL-IMP: SIGNIFICANT CHANGE UP
HGB BLD-MCNC: 11.7 G/DL — SIGNIFICANT CHANGE UP (ref 11.5–15.5)
MAGNESIUM SERPL-MCNC: 1.6 MG/DL — SIGNIFICANT CHANGE UP (ref 1.6–2.6)
MCHC RBC-ENTMCNC: 24.2 PG — LOW (ref 27–34)
MCHC RBC-ENTMCNC: 31.8 GM/DL — LOW (ref 32–36)
MCV RBC AUTO: 76.2 FL — LOW (ref 80–100)
NRBC # BLD: 0 /100 WBCS — SIGNIFICANT CHANGE UP (ref 0–0)
PHOSPHATE SERPL-MCNC: 1.7 MG/DL — LOW (ref 2.5–4.5)
PLATELET # BLD AUTO: 187 K/UL — SIGNIFICANT CHANGE UP (ref 150–400)
POTASSIUM SERPL-MCNC: 3.7 MMOL/L — SIGNIFICANT CHANGE UP (ref 3.5–5.3)
POTASSIUM SERPL-SCNC: 3.7 MMOL/L — SIGNIFICANT CHANGE UP (ref 3.5–5.3)
PROT SERPL-MCNC: 6.7 GM/DL — SIGNIFICANT CHANGE UP (ref 6–8.3)
RBC # BLD: 4.83 M/UL — SIGNIFICANT CHANGE UP (ref 3.8–5.2)
RBC # FLD: 14.8 % — HIGH (ref 10.3–14.5)
SODIUM SERPL-SCNC: 134 MMOL/L — LOW (ref 135–145)
T GONDII IGG SER QL: <3 IU/ML — SIGNIFICANT CHANGE UP
T GONDII IGG SER QL: NEGATIVE — SIGNIFICANT CHANGE UP
WBC # BLD: 7.59 K/UL — SIGNIFICANT CHANGE UP (ref 3.8–10.5)
WBC # FLD AUTO: 7.59 K/UL — SIGNIFICANT CHANGE UP (ref 3.8–10.5)

## 2021-02-12 PROCEDURE — 76937 US GUIDE VASCULAR ACCESS: CPT | Mod: 26

## 2021-02-12 PROCEDURE — 36410 VNPNXR 3YR/> PHY/QHP DX/THER: CPT

## 2021-02-12 PROCEDURE — 99252 IP/OBS CONSLTJ NEW/EST SF 35: CPT

## 2021-02-12 PROCEDURE — 99233 SBSQ HOSP IP/OBS HIGH 50: CPT

## 2021-02-12 RX ORDER — POTASSIUM PHOSPHATE, MONOBASIC POTASSIUM PHOSPHATE, DIBASIC 236; 224 MG/ML; MG/ML
15 INJECTION, SOLUTION INTRAVENOUS ONCE
Refills: 0 | Status: COMPLETED | OUTPATIENT
Start: 2021-02-12 | End: 2021-02-12

## 2021-02-12 RX ORDER — SODIUM CHLORIDE 9 MG/ML
1000 INJECTION, SOLUTION INTRAVENOUS
Refills: 0 | Status: DISCONTINUED | OUTPATIENT
Start: 2021-02-12 | End: 2021-02-15

## 2021-02-12 RX ORDER — INSULIN GLARGINE 100 [IU]/ML
15 INJECTION, SOLUTION SUBCUTANEOUS EVERY MORNING
Refills: 0 | Status: DISCONTINUED | OUTPATIENT
Start: 2021-02-13 | End: 2021-02-15

## 2021-02-12 RX ORDER — INSULIN GLARGINE 100 [IU]/ML
5 INJECTION, SOLUTION SUBCUTANEOUS ONCE
Refills: 0 | Status: COMPLETED | OUTPATIENT
Start: 2021-02-12 | End: 2021-02-12

## 2021-02-12 RX ORDER — QUETIAPINE FUMARATE 200 MG/1
12.5 TABLET, FILM COATED ORAL AT BEDTIME
Refills: 0 | Status: DISCONTINUED | OUTPATIENT
Start: 2021-02-12 | End: 2021-02-15

## 2021-02-12 RX ORDER — SODIUM,POTASSIUM PHOSPHATES 278-250MG
1 POWDER IN PACKET (EA) ORAL ONCE
Refills: 0 | Status: COMPLETED | OUTPATIENT
Start: 2021-02-12 | End: 2021-02-12

## 2021-02-12 RX ORDER — QUETIAPINE FUMARATE 200 MG/1
12.5 TABLET, FILM COATED ORAL
Refills: 0 | Status: DISCONTINUED | OUTPATIENT
Start: 2021-02-12 | End: 2021-02-12

## 2021-02-12 RX ADMIN — POTASSIUM PHOSPHATE, MONOBASIC POTASSIUM PHOSPHATE, DIBASIC 62.5 MILLIMOLE(S): 236; 224 INJECTION, SOLUTION INTRAVENOUS at 16:36

## 2021-02-12 RX ADMIN — QUETIAPINE FUMARATE 25 MILLIGRAM(S): 200 TABLET, FILM COATED ORAL at 05:35

## 2021-02-12 RX ADMIN — CHLORHEXIDINE GLUCONATE 1 APPLICATION(S): 213 SOLUTION TOPICAL at 05:33

## 2021-02-12 RX ADMIN — Medication 8: at 07:58

## 2021-02-12 RX ADMIN — SODIUM CHLORIDE 75 MILLILITER(S): 9 INJECTION, SOLUTION INTRAVENOUS at 22:51

## 2021-02-12 RX ADMIN — BICTEGRAVIR SODIUM, EMTRICITABINE, AND TENOFOVIR ALAFENAMIDE FUMARATE 1 TABLET(S): 30; 120; 15 TABLET ORAL at 11:53

## 2021-02-12 RX ADMIN — INSULIN GLARGINE 5 UNIT(S): 100 INJECTION, SOLUTION SUBCUTANEOUS at 11:52

## 2021-02-12 RX ADMIN — SODIUM CHLORIDE 75 MILLILITER(S): 9 INJECTION, SOLUTION INTRAVENOUS at 16:35

## 2021-02-12 RX ADMIN — Medication 1 TABLET(S): at 11:54

## 2021-02-12 RX ADMIN — ENOXAPARIN SODIUM 40 MILLIGRAM(S): 100 INJECTION SUBCUTANEOUS at 11:53

## 2021-02-12 RX ADMIN — Medication 4: at 16:14

## 2021-02-12 RX ADMIN — QUETIAPINE FUMARATE 12.5 MILLIGRAM(S): 200 TABLET, FILM COATED ORAL at 23:46

## 2021-02-12 RX ADMIN — Medication 1 PACKET(S): at 11:56

## 2021-02-12 RX ADMIN — INSULIN GLARGINE 10 UNIT(S): 100 INJECTION, SOLUTION SUBCUTANEOUS at 07:58

## 2021-02-12 NOTE — PROGRESS NOTE ADULT - ASSESSMENT
40F with a PMH of DM2 and HIV on Biktarvy who presents to the ED for nausea and vomiting. Found to be in DKA, Neuro consulted for AMS. Pe non verbal tracking, Catatonic, when arms lifted remains up increased tone. 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 Discussed with primary team, can not definitively r/o AIE    -Can obtain MRI brain w/w/o con and routine EEG, pending findings may consider serum AIE panel  -consider ammonia level  -Continue to clinically monitor

## 2021-02-12 NOTE — CHART NOTE - NSCHARTNOTEFT_GEN_A_CORE
full note pending    May go to medical floor  Seen and d.w ICU attending Dr Arriola 41 yo BF with h/o DM and HIV on Biktarvy who presents to the ER 2 to N/V found to be in DKA.  Pt recently returned home after extensive rehabilitation and hospital courses following after being found down and hypoglycemic for unknown time in December 2019, she never returned back to baseline mental status.  Upon arrival to bedside, pt was seen to be awake but disoriented, unable to follow commands or respond to questions appropriately, repeatedly stating "I do not feel well" and attempting to get out of bed. Pt admitted to the ICU for management of DKA. Now off insulin drip. Endo consulted    May go to medical floor  Seen and d.w ICU attending Dr Arriola 39 yo BF with h/o DM and HIV on Biktarvy who presents to the ER 2 to N/V found to be in DKA.  Pt recently returned home after extensive rehabilitation and hospital courses following after being found down and hypoglycemic for unknown time in December 2019, she never returned back to baseline mental status.  Upon arrival to bedside, pt was seen to be awake but disoriented, unable to follow commands or respond to questions appropriately, repeatedly stating "I do not feel well" and attempting to get out of bed. Pt admitted to the ICU for management of DKA. Now off insulin drip. Endo consulted. Midline placed 2/12    May go to medical floor  Signout given to Dr Velvet Griffith and chris ICU attending Dr Arriola

## 2021-02-12 NOTE — DIETITIAN INITIAL EVALUATION ADULT. - PERTINENT MEDS FT
MEDICATIONS  (STANDING):  bictegravir 50 mG/emtricitabine 200 mG/tenofovir alafenamide 25 mG (BIKTARVY) 1 Tablet(s) Oral daily  chlorhexidine 4% Liquid 1 Application(s) Topical <User Schedule>  dextrose 40% Gel 15 Gram(s) Oral once  dextrose 5%. 1000 milliLiter(s) (50 mL/Hr) IV Continuous <Continuous>  dextrose 5%. 1000 milliLiter(s) (100 mL/Hr) IV Continuous <Continuous>  dextrose 50% Injectable 25 Gram(s) IV Push once  dextrose 50% Injectable 12.5 Gram(s) IV Push once  dextrose 50% Injectable 25 Gram(s) IV Push once  enoxaparin Injectable 40 milliGRAM(s) SubCutaneous daily  glucagon  Injectable 1 milliGRAM(s) IntraMuscular once  insulin glargine Injectable (LANTUS) 5 Unit(s) SubCutaneous once  insulin lispro (ADMELOG) corrective regimen sliding scale   SubCutaneous three times a day before meals  insulin lispro (ADMELOG) corrective regimen sliding scale   SubCutaneous at bedtime  potassium phosphate / sodium phosphate Powder (PHOS-NaK) 1 Packet(s) Oral once  QUEtiapine 25 milliGRAM(s) Oral two times a day  trimethoprim  160 mG/sulfamethoxazole 800 mG 1 Tablet(s) Oral daily    MEDICATIONS  (PRN): Other

## 2021-02-12 NOTE — DIETITIAN INITIAL EVALUATION ADULT. - ORAL INTAKE PTA/DIET HISTORY
Pt asleep, opened eyes when called, did not respond back, disorientation noted.  RD spoke c pt's mother via phone, pt was in Rehab for ~ 1 year after hypoglycemic episode coma, hx feeding tube to oral feeding c good appetite.  Pt recently d/c'd home 1 day PTA.

## 2021-02-12 NOTE — PROCEDURE NOTE - ADDITIONAL PROCEDURE DETAILS
s/p midline placement inserted into the left basilic vein under US guidance.  4fr x 20cm SL.  No complications  -midline can be accessed

## 2021-02-12 NOTE — DIETITIAN INITIAL EVALUATION ADULT. - CONTINUE CURRENT NUTRITION CARE PLAN
add Glucerede Patrickke 3 x daily =660 calories, 30 grams protein and advance diet as tolerated to Low sodium, consistent carbohydrate c evening snack/yes

## 2021-02-12 NOTE — DIETITIAN INITIAL EVALUATION ADULT. - OTHER INFO
Pt c poor oral intake as per Nursing Assistant, took some of the liquids from tray w/o N/V & swallowing problems as per Nursing Assistant.  Pt was sent home c insulin glargine 26 units and Humalog before meals.  As per mother, she did not give the insulin glargine due to concern of Low glucose.    Pt is unable to self administer insulin or self monitor glucose for diabetes management.   RD   As per mother, pt c hx of DKA's and usually snaps out of it quick and requested that pt be tried on solid foods.  Pt's mother c knowledge deficit regarding diabetes management.   RD educated mother on CHO controlled diet c meal planning c plate method, blood glucose goals, sick day management c type 1 DM & need for basal insulin.   Written information to be mailed home.  Endocrine consult noted.

## 2021-02-12 NOTE — DIETITIAN INITIAL EVALUATION ADULT. - PERTINENT LABORATORY DATA
02-12 Na134 mmol/L<L> Glu 294 mg/dL<H> K+ 3.7 mmol/L Cr  0.98 mg/dL BUN 3 mg/dL<L> 02-12 Phos 1.7 mg/dL<L> 02-12 Alb 2.8 g/dL<L>02-12 ALT 20 U/L AST 16 U/L Alkaline Phosphatase 118 U/L  02/10, Glucose 472 mg/dL <H>  02-11-21 @ 09:42 a1c 10.8<H>

## 2021-02-12 NOTE — PROGRESS NOTE ADULT - ASSESSMENT
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 (recent)  CT brain on admission no acute finding  ammonia level  f/u MRI  Neurology following  supportive care  started Seroquel in ICU, decrease to night instead of BID due to lethargy   SLP and PT eval    HIV  unknown cd4 and viral load  on Biktarvy and bactrim     DVT ppx: Lovenox

## 2021-02-12 NOTE — DIETITIAN INITIAL EVALUATION ADULT. - NS FNS REASON FOR WEIGHT CHANG
wt. gain in Rehab after wt. loss during illness in december 2019, pt lost weight and was 100 LBS and gained wt. up to 145 LBS.  Pt c usual wt. 125 LBS/other (specify)

## 2021-02-12 NOTE — PROGRESS NOTE ADULT - ASSESSMENT
Spoke with pt's mother who reports that Ananda Ortega was admitted to [facility] up until two days ago, where she resided for the last year.  In December, 2019 the pt was found unresponsive on the floor, was in coma for prolonged period of time, PMH of DM type 1 and HIV "diagnosed years ago", on Biktarvy and Bactrim.  Pt's mother does not know what is pt's HIV viral load, states that the rehab has scheduled a follow up apt with a new PCP - the apt did not happen yet. Pt's mother asked me to call the rehab for any further information, I called the rehab at [phone], left a voicemail.   The pt presented to ED s/p N/V at home.   The pt is afebrile since admission, tachycardic, on RA, WBC 14.27, Hgb A1c 10.8, drug screen negative, RVP negative, CT head - ? acute mastoiditis, CT abd/pelvis - ? gastritis, distended bladder, ovarian cysts, ? ovarian torsion - possibly needs pelvic US, low chest - subsegmental atelectasis, partially visualized breast implants. Chest xray is clear.   Blood glucose level was high 472, Anion gap 21 on admission, moderate acetone, admitted to ICU with DKA without coma.     2/12: ready for transfer to floor. cd4 >200. awaiting all other labs, neuro following and appreciate recs.    1. HIV  2. Leukocytosis  3. AMS  4. Uncontrolled DM I, Hgb A1c 10.8  5. DKA    Suggestions   - continue Biktarvy  - continue Bactrim   - send HBV and HCV serology, toxo IgG, crytp ag serum    - EEG and MRI brain   - autoimmune encephalitis panel from serum(may need downtime form to order->follow up with neuro)   - consider transvaginal US to further assess ovarian cyst/lesion  - awaiting for HIV viral load  - need collateral from  pt's rehab facility to obtain more information  - monitor pt's WBC  - monitor pt's temperatures   - DKA management per ICU and endocrine     will follow   Yashira Perea NP will be covering for the weekend.  Please call [phone]     Julio Cesar Amado DO  Cell: [phone]  Pager [phone]  Infectious Disease Attending  After 5pm/weekends please call [phone] for all inquiries and new consults

## 2021-02-12 NOTE — PROGRESS NOTE ADULT - ASSESSMENT
Pt is a 41 yo BF with h/o DM and HIV on Biktarvy who presents to the ER 2 to N/V found to be in DKA.  Pt recently returned home after extensive rehabilitation and hospital courses following after being found down and hypoglycemic for unknown time in December 2019, she never returned back to baseline mental status.  Upon arrival to bedside, pt was seen to be awake but disoriented, unable to follow commands or respond to questions appropriately, repeatedly stating "I do not feel well" and attempting to get out of bed. Pt admitted to the ICU for management of DKA    ID: Cont pt's Biktarvy   FEN: Pt with decrease in serum bicarb today; start IVF/ ADA po diet/ Replace Phos; pt hypophosphatemic   Endo: Adjust Lantus + Lispro to FS as per Endo  Neuro: Pt may now be at her baseline MS  Social: OOB->chair/ May transfer to [facility]

## 2021-02-13 LAB
4/8 RATIO: 0.35 RATIO — LOW (ref 0.9–3.6)
ABS CD8: 1013 /UL — HIGH (ref 142–740)
ALBUMIN SERPL ELPH-MCNC: 2.9 G/DL — LOW (ref 3.3–5)
ALP SERPL-CCNC: 118 U/L — SIGNIFICANT CHANGE UP (ref 40–120)
ALT FLD-CCNC: 24 U/L — SIGNIFICANT CHANGE UP (ref 12–78)
AMMONIA BLD-MCNC: 33 UMOL/L — HIGH (ref 11–32)
ANION GAP SERPL CALC-SCNC: 5 MMOL/L — SIGNIFICANT CHANGE UP (ref 5–17)
AST SERPL-CCNC: 18 U/L — SIGNIFICANT CHANGE UP (ref 15–37)
BASOPHILS # BLD AUTO: 0.05 K/UL — SIGNIFICANT CHANGE UP (ref 0–0.2)
BASOPHILS NFR BLD AUTO: 0.8 % — SIGNIFICANT CHANGE UP (ref 0–2)
BILIRUB SERPL-MCNC: 0.7 MG/DL — SIGNIFICANT CHANGE UP (ref 0.2–1.2)
BUN SERPL-MCNC: 3 MG/DL — LOW (ref 7–23)
CALCIUM SERPL-MCNC: 8.4 MG/DL — LOW (ref 8.5–10.1)
CD3 BLASTS SPEC-ACNC: 1430 /UL — SIGNIFICANT CHANGE UP (ref 672–1870)
CD3 BLASTS SPEC-ACNC: 66 % — SIGNIFICANT CHANGE UP (ref 59–83)
CD4 %: 16 % — LOW (ref 30–62)
CD8 %: 47 % — HIGH (ref 12–36)
CHLORIDE SERPL-SCNC: 108 MMOL/L — SIGNIFICANT CHANGE UP (ref 96–108)
CO2 SERPL-SCNC: 27 MMOL/L — SIGNIFICANT CHANGE UP (ref 22–31)
CREAT SERPL-MCNC: 0.93 MG/DL — SIGNIFICANT CHANGE UP (ref 0.5–1.3)
CRYPTOC AG FLD QL: NEGATIVE — SIGNIFICANT CHANGE UP
EOSINOPHIL # BLD AUTO: 0.13 K/UL — SIGNIFICANT CHANGE UP (ref 0–0.5)
EOSINOPHIL NFR BLD AUTO: 2.1 % — SIGNIFICANT CHANGE UP (ref 0–6)
GLUCOSE BLDC GLUCOMTR-MCNC: 129 MG/DL — HIGH (ref 70–99)
GLUCOSE BLDC GLUCOMTR-MCNC: 149 MG/DL — HIGH (ref 70–99)
GLUCOSE BLDC GLUCOMTR-MCNC: 246 MG/DL — HIGH (ref 70–99)
GLUCOSE SERPL-MCNC: 133 MG/DL — HIGH (ref 70–99)
HCT VFR BLD CALC: 37.3 % — SIGNIFICANT CHANGE UP (ref 34.5–45)
HGB BLD-MCNC: 12 G/DL — SIGNIFICANT CHANGE UP (ref 11.5–15.5)
HIV-1 VIRAL LOAD RESULT: SIGNIFICANT CHANGE UP
HIV-1 VIRAL LOAD RESULT: SIGNIFICANT CHANGE UP
HIV1 RNA # SERPL NAA+PROBE: SIGNIFICANT CHANGE UP
HIV1 RNA # SERPL NAA+PROBE: SIGNIFICANT CHANGE UP
HIV1 RNA SER-IMP: SIGNIFICANT CHANGE UP
HIV1 RNA SER-IMP: SIGNIFICANT CHANGE UP
HIV1 RNA SERPL NAA+PROBE-ACNC: SIGNIFICANT CHANGE UP
HIV1 RNA SERPL NAA+PROBE-ACNC: SIGNIFICANT CHANGE UP
HIV1 RNA SERPL NAA+PROBE-LOG#: SIGNIFICANT CHANGE UP LG COP/ML
HIV1 RNA SERPL NAA+PROBE-LOG#: SIGNIFICANT CHANGE UP LG COP/ML
HYPOCHROMIA BLD QL: SLIGHT — SIGNIFICANT CHANGE UP
IMM GRANULOCYTES NFR BLD AUTO: 0.3 % — SIGNIFICANT CHANGE UP (ref 0–1.5)
LG PLATELETS BLD QL AUTO: SLIGHT — SIGNIFICANT CHANGE UP
LYMPHOCYTES # BLD AUTO: 2.23 K/UL — SIGNIFICANT CHANGE UP (ref 1–3.3)
LYMPHOCYTES # BLD AUTO: 35.2 % — SIGNIFICANT CHANGE UP (ref 13–44)
MAGNESIUM SERPL-MCNC: 1.6 MG/DL — SIGNIFICANT CHANGE UP (ref 1.6–2.6)
MANUAL SMEAR VERIFICATION: SIGNIFICANT CHANGE UP
MCHC RBC-ENTMCNC: 24 PG — LOW (ref 27–34)
MCHC RBC-ENTMCNC: 32.2 GM/DL — SIGNIFICANT CHANGE UP (ref 32–36)
MCV RBC AUTO: 74.6 FL — LOW (ref 80–100)
MICROCYTES BLD QL: SIGNIFICANT CHANGE UP
MONOCYTES # BLD AUTO: 0.69 K/UL — SIGNIFICANT CHANGE UP (ref 0–0.9)
MONOCYTES NFR BLD AUTO: 10.9 % — SIGNIFICANT CHANGE UP (ref 2–14)
NEUTROPHILS # BLD AUTO: 3.21 K/UL — SIGNIFICANT CHANGE UP (ref 1.8–7.4)
NEUTROPHILS NFR BLD AUTO: 50.7 % — SIGNIFICANT CHANGE UP (ref 43–77)
NRBC # BLD: 0 /100 WBCS — SIGNIFICANT CHANGE UP (ref 0–0)
OVALOCYTES BLD QL SMEAR: SLIGHT — SIGNIFICANT CHANGE UP
PHOSPHATE SERPL-MCNC: 2.9 MG/DL — SIGNIFICANT CHANGE UP (ref 2.5–4.5)
PLAT MORPH BLD: ABNORMAL
PLATELET # BLD AUTO: 195 K/UL — SIGNIFICANT CHANGE UP (ref 150–400)
PLATELET COUNT - ESTIMATE: NORMAL — SIGNIFICANT CHANGE UP
POTASSIUM SERPL-MCNC: 2.9 MMOL/L — CRITICAL LOW (ref 3.5–5.3)
POTASSIUM SERPL-SCNC: 2.9 MMOL/L — CRITICAL LOW (ref 3.5–5.3)
PROT SERPL-MCNC: 6.7 GM/DL — SIGNIFICANT CHANGE UP (ref 6–8.3)
RBC # BLD: 5 M/UL — SIGNIFICANT CHANGE UP (ref 3.8–5.2)
RBC # FLD: 14.6 % — HIGH (ref 10.3–14.5)
RBC BLD AUTO: ABNORMAL
SODIUM SERPL-SCNC: 140 MMOL/L — SIGNIFICANT CHANGE UP (ref 135–145)
SPHEROCYTES BLD QL SMEAR: SLIGHT — SIGNIFICANT CHANGE UP
T-CELL CD4 SUBSET PNL BLD: 350 /UL — LOW (ref 489–1457)
TARGETS BLD QL SMEAR: SLIGHT — SIGNIFICANT CHANGE UP
WBC # BLD: 6.33 K/UL — SIGNIFICANT CHANGE UP (ref 3.8–10.5)
WBC # FLD AUTO: 6.33 K/UL — SIGNIFICANT CHANGE UP (ref 3.8–10.5)

## 2021-02-13 PROCEDURE — 99232 SBSQ HOSP IP/OBS MODERATE 35: CPT

## 2021-02-13 RX ORDER — POTASSIUM CHLORIDE 20 MEQ
40 PACKET (EA) ORAL EVERY 4 HOURS
Refills: 0 | Status: COMPLETED | OUTPATIENT
Start: 2021-02-13 | End: 2021-02-13

## 2021-02-13 RX ORDER — POTASSIUM CHLORIDE 20 MEQ
10 PACKET (EA) ORAL
Refills: 0 | Status: COMPLETED | OUTPATIENT
Start: 2021-02-13 | End: 2021-02-13

## 2021-02-13 RX ADMIN — Medication 100 MILLIEQUIVALENT(S): at 09:08

## 2021-02-13 RX ADMIN — INSULIN GLARGINE 15 UNIT(S): 100 INJECTION, SOLUTION SUBCUTANEOUS at 09:01

## 2021-02-13 RX ADMIN — Medication 1 TABLET(S): at 12:34

## 2021-02-13 RX ADMIN — QUETIAPINE FUMARATE 12.5 MILLIGRAM(S): 200 TABLET, FILM COATED ORAL at 20:13

## 2021-02-13 RX ADMIN — SODIUM CHLORIDE 75 MILLILITER(S): 9 INJECTION, SOLUTION INTRAVENOUS at 09:01

## 2021-02-13 RX ADMIN — ENOXAPARIN SODIUM 40 MILLIGRAM(S): 100 INJECTION SUBCUTANEOUS at 11:57

## 2021-02-13 RX ADMIN — Medication 100 MILLIEQUIVALENT(S): at 10:53

## 2021-02-13 RX ADMIN — Medication 0.5 MILLIGRAM(S): at 22:52

## 2021-02-13 RX ADMIN — Medication 40 MILLIEQUIVALENT(S): at 09:05

## 2021-02-13 RX ADMIN — CHLORHEXIDINE GLUCONATE 1 APPLICATION(S): 213 SOLUTION TOPICAL at 07:03

## 2021-02-13 RX ADMIN — BICTEGRAVIR SODIUM, EMTRICITABINE, AND TENOFOVIR ALAFENAMIDE FUMARATE 1 TABLET(S): 30; 120; 15 TABLET ORAL at 12:34

## 2021-02-13 RX ADMIN — Medication 100 MILLIEQUIVALENT(S): at 10:20

## 2021-02-13 RX ADMIN — Medication 4: at 11:44

## 2021-02-13 RX ADMIN — Medication 40 MILLIEQUIVALENT(S): at 14:28

## 2021-02-13 NOTE — PHYSICAL THERAPY INITIAL EVALUATION ADULT - ADDITIONAL COMMENTS
Per chart review. Pt lives in a house with 4 steps to enter with railing. Independent with ambulation but requires assist with ADLs.

## 2021-02-13 NOTE — PHYSICAL THERAPY INITIAL EVALUATION ADULT - GAIT TRAINING, PT EVAL
Independent in ambulation with use of --device up to --feet observing proper gait pattern, posture and use of walking device safely.

## 2021-02-13 NOTE — PHYSICAL THERAPY INITIAL EVALUATION ADULT - STRENGTHENING, PT EVAL
Improve strength and general endurance to good and be able to perform functional task- bed mobility, transfers and ambulation at a safe level and prevent falls.

## 2021-02-13 NOTE — PROGRESS NOTE ADULT - ASSESSMENT
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 (recent)  CT brain on admission no acute finding  ammonia level wnl  f/u MRI  Neurology following  supportive care  started Seroquel in ICU, decrease to night instead of BID due to lethargy   SLP and PT eval    HIV  ID on board   on Biktarvy and bactrim     DVT ppx: Lovenox

## 2021-02-14 LAB
ANION GAP SERPL CALC-SCNC: 8 MMOL/L — SIGNIFICANT CHANGE UP (ref 5–17)
BUN SERPL-MCNC: 2 MG/DL — LOW (ref 7–23)
CALCIUM SERPL-MCNC: 8.9 MG/DL — SIGNIFICANT CHANGE UP (ref 8.5–10.1)
CHLORIDE SERPL-SCNC: 103 MMOL/L — SIGNIFICANT CHANGE UP (ref 96–108)
CO2 SERPL-SCNC: 26 MMOL/L — SIGNIFICANT CHANGE UP (ref 22–31)
CREAT SERPL-MCNC: 0.96 MG/DL — SIGNIFICANT CHANGE UP (ref 0.5–1.3)
GLUCOSE SERPL-MCNC: 272 MG/DL — HIGH (ref 70–99)
MAGNESIUM SERPL-MCNC: 2 MG/DL — SIGNIFICANT CHANGE UP (ref 1.6–2.6)
POTASSIUM SERPL-MCNC: 3.7 MMOL/L — SIGNIFICANT CHANGE UP (ref 3.5–5.3)
POTASSIUM SERPL-SCNC: 3.7 MMOL/L — SIGNIFICANT CHANGE UP (ref 3.5–5.3)
SODIUM SERPL-SCNC: 137 MMOL/L — SIGNIFICANT CHANGE UP (ref 135–145)

## 2021-02-14 PROCEDURE — 70553 MRI BRAIN STEM W/O & W/DYE: CPT | Mod: 26

## 2021-02-14 PROCEDURE — 99232 SBSQ HOSP IP/OBS MODERATE 35: CPT

## 2021-02-14 RX ADMIN — Medication 1 TABLET(S): at 11:54

## 2021-02-14 RX ADMIN — Medication 2: at 20:29

## 2021-02-14 RX ADMIN — QUETIAPINE FUMARATE 12.5 MILLIGRAM(S): 200 TABLET, FILM COATED ORAL at 20:30

## 2021-02-14 RX ADMIN — Medication 2: at 16:24

## 2021-02-14 RX ADMIN — Medication 2: at 11:52

## 2021-02-14 RX ADMIN — BICTEGRAVIR SODIUM, EMTRICITABINE, AND TENOFOVIR ALAFENAMIDE FUMARATE 1 TABLET(S): 30; 120; 15 TABLET ORAL at 11:53

## 2021-02-14 RX ADMIN — Medication 6: at 08:22

## 2021-02-14 RX ADMIN — ENOXAPARIN SODIUM 40 MILLIGRAM(S): 100 INJECTION SUBCUTANEOUS at 11:55

## 2021-02-14 RX ADMIN — CHLORHEXIDINE GLUCONATE 1 APPLICATION(S): 213 SOLUTION TOPICAL at 08:11

## 2021-02-14 RX ADMIN — INSULIN GLARGINE 15 UNIT(S): 100 INJECTION, SOLUTION SUBCUTANEOUS at 09:11

## 2021-02-14 RX ADMIN — Medication 0.5 MILLIGRAM(S): at 22:28

## 2021-02-14 NOTE — PROGRESS NOTE ADULT - ASSESSMENT
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 (recent)  CT brain on admission no acute finding  ammonia level wnl  f/u MRI/EEG  Neurology following  supportive care  started Seroquel in ICU, decrease to night instead of BID due to lethargy   SLP and PT eval    Hypokalemia  improved    HIV  ID on board   on Biktarvy and bactrim     DVT ppx: Lovenox

## 2021-02-14 NOTE — PROGRESS NOTE ADULT - ASSESSMENT
40F with a PMH of DM2 and HIV on Biktarvy who presents to the ED for nausea and vomiting. Found to be in DKA, Neuro consulted for AMS. Pe non verbal tracking, Catatonic, when arms lifted remains up increased tone. 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 Discussed with primary team, can not definitively r/o AIE vs other central pathologies    -Can obtain MRI brain w/w/o con and routine EEG, pending findings may consider serum AIE panel  -Continue to clinically monitor

## 2021-02-15 LAB
GLUCOSE BLDC GLUCOMTR-MCNC: 129 MG/DL — HIGH (ref 70–99)
GLUCOSE BLDC GLUCOMTR-MCNC: 164 MG/DL — HIGH (ref 70–99)

## 2021-02-15 PROCEDURE — 99233 SBSQ HOSP IP/OBS HIGH 50: CPT

## 2021-02-15 RX ORDER — INSULIN LISPRO 100/ML
6 VIAL (ML) SUBCUTANEOUS
Refills: 0 | Status: DISCONTINUED | OUTPATIENT
Start: 2021-02-15 | End: 2021-02-16

## 2021-02-15 RX ORDER — INSULIN LISPRO 100/ML
4 VIAL (ML) SUBCUTANEOUS
Refills: 0 | Status: DISCONTINUED | OUTPATIENT
Start: 2021-02-15 | End: 2021-02-15

## 2021-02-15 RX ORDER — QUETIAPINE FUMARATE 200 MG/1
12.5 TABLET, FILM COATED ORAL
Refills: 0 | Status: DISCONTINUED | OUTPATIENT
Start: 2021-02-15 | End: 2021-02-17

## 2021-02-15 RX ORDER — INSULIN GLARGINE 100 [IU]/ML
20 INJECTION, SOLUTION SUBCUTANEOUS AT BEDTIME
Refills: 0 | Status: DISCONTINUED | OUTPATIENT
Start: 2021-02-15 | End: 2021-02-16

## 2021-02-15 RX ADMIN — Medication 4 UNIT(S): at 12:20

## 2021-02-15 RX ADMIN — BICTEGRAVIR SODIUM, EMTRICITABINE, AND TENOFOVIR ALAFENAMIDE FUMARATE 1 TABLET(S): 30; 120; 15 TABLET ORAL at 14:00

## 2021-02-15 RX ADMIN — Medication 6 UNIT(S): at 16:44

## 2021-02-15 RX ADMIN — QUETIAPINE FUMARATE 12.5 MILLIGRAM(S): 200 TABLET, FILM COATED ORAL at 16:22

## 2021-02-15 RX ADMIN — INSULIN GLARGINE 15 UNIT(S): 100 INJECTION, SOLUTION SUBCUTANEOUS at 08:21

## 2021-02-15 RX ADMIN — Medication 1 TABLET(S): at 13:49

## 2021-02-15 RX ADMIN — Medication 4: at 08:21

## 2021-02-15 RX ADMIN — Medication 4: at 12:20

## 2021-02-15 RX ADMIN — ENOXAPARIN SODIUM 40 MILLIGRAM(S): 100 INJECTION SUBCUTANEOUS at 14:00

## 2021-02-15 RX ADMIN — CHLORHEXIDINE GLUCONATE 1 APPLICATION(S): 213 SOLUTION TOPICAL at 06:10

## 2021-02-15 RX ADMIN — INSULIN GLARGINE 20 UNIT(S): 100 INJECTION, SOLUTION SUBCUTANEOUS at 21:47

## 2021-02-15 NOTE — PROGRESS NOTE ADULT - ASSESSMENT
Assessment and plan    Problem/Plan-1:  Problem: DKA  improved  s/p Insulin drip  currently on ILSS and increase lantus to 18 unit in am  HbA1c 11.1  c/w IVF for now  monitor CBG and adjust insulin  Endo following    Problem/Plan-2:  Problem:  Encephalopathy with Catatonia  possibly element of HIV encephalopathy/dementia worsened by episodes of hypoglycemia. (recent)  CT brain on admission no acute finding  ammonia level wnl  f/u MRI/EEG  Neurology following  supportive care  started Seroquel in ICU, decrease to night instead of BID due to lethargy   SLP and PT eval    Problem/Plan-3:  Problem: Hypokalemia  improved    Problem/Plan-4:  Problem: HIV  ID on board   on Biktarvy and bactrim

## 2021-02-15 NOTE — PROGRESS NOTE ADULT - ASSESSMENT
40F with a PMH of DM2 and HIV on Biktarvy who presents to the ED for nausea and vomiting. Found to be in DKA, Neuro consulted for AMS. Pe non verbal tracking, Catatonic, when arms lifted remains up increased tone. 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 Discussed with primary team, can not definitively r/o AIE vs other central pathologies    -MRI brain limited no acute findings   routine EEG, pending   -Continue to clinically monitor

## 2021-02-16 ENCOUNTER — TRANSCRIPTION ENCOUNTER (OUTPATIENT)
Age: 41
End: 2021-02-16

## 2021-02-16 DIAGNOSIS — E11.65 TYPE 2 DIABETES MELLITUS WITH HYPERGLYCEMIA: ICD-10-CM

## 2021-02-16 LAB
GLUCOSE BLDC GLUCOMTR-MCNC: 106 MG/DL — HIGH (ref 70–99)
GLUCOSE BLDC GLUCOMTR-MCNC: 194 MG/DL — HIGH (ref 70–99)
GLUCOSE BLDC GLUCOMTR-MCNC: 258 MG/DL — HIGH (ref 70–99)
GLUCOSE BLDC GLUCOMTR-MCNC: 396 MG/DL — HIGH (ref 70–99)
GLUCOSE BLDC GLUCOMTR-MCNC: 57 MG/DL — LOW (ref 70–99)
GLUCOSE BLDC GLUCOMTR-MCNC: 61 MG/DL — LOW (ref 70–99)
GLUCOSE BLDC GLUCOMTR-MCNC: 64 MG/DL — LOW (ref 70–99)

## 2021-02-16 PROCEDURE — 99232 SBSQ HOSP IP/OBS MODERATE 35: CPT

## 2021-02-16 PROCEDURE — 99233 SBSQ HOSP IP/OBS HIGH 50: CPT

## 2021-02-16 RX ORDER — BICTEGRAVIR SODIUM, EMTRICITABINE, AND TENOFOVIR ALAFENAMIDE FUMARATE 30; 120; 15 MG/1; MG/1; MG/1
1 TABLET ORAL
Qty: 0 | Refills: 0 | DISCHARGE

## 2021-02-16 RX ORDER — ENOXAPARIN SODIUM 100 MG/ML
30 INJECTION SUBCUTANEOUS
Qty: 1 | Refills: 1
Start: 2021-02-16 | End: 2021-04-16

## 2021-02-16 RX ORDER — BICTEGRAVIR SODIUM, EMTRICITABINE, AND TENOFOVIR ALAFENAMIDE FUMARATE 30; 120; 15 MG/1; MG/1; MG/1
1 TABLET ORAL
Qty: 30 | Refills: 0
Start: 2021-02-16 | End: 2021-03-17

## 2021-02-16 RX ORDER — ENOXAPARIN SODIUM 100 MG/ML
28 INJECTION SUBCUTANEOUS
Qty: 0 | Refills: 0 | DISCHARGE

## 2021-02-16 RX ORDER — INSULIN LISPRO 100/ML
10 VIAL (ML) SUBCUTANEOUS
Qty: 0 | Refills: 0 | DISCHARGE

## 2021-02-16 RX ORDER — METFORMIN HYDROCHLORIDE 850 MG/1
1 TABLET ORAL
Qty: 60 | Refills: 0
Start: 2021-02-16 | End: 2021-03-17

## 2021-02-16 RX ORDER — METFORMIN HYDROCHLORIDE 850 MG/1
500 TABLET ORAL
Refills: 0 | Status: DISCONTINUED | OUTPATIENT
Start: 2021-02-16 | End: 2021-02-17

## 2021-02-16 RX ORDER — INSULIN LISPRO 100/ML
10 VIAL (ML) SUBCUTANEOUS
Refills: 0 | Status: DISCONTINUED | OUTPATIENT
Start: 2021-02-16 | End: 2021-02-17

## 2021-02-16 RX ORDER — QUETIAPINE FUMARATE 200 MG/1
1 TABLET, FILM COATED ORAL
Qty: 15 | Refills: 0
Start: 2021-02-16 | End: 2021-03-02

## 2021-02-16 RX ORDER — INSULIN GLARGINE 100 [IU]/ML
28 INJECTION, SOLUTION SUBCUTANEOUS AT BEDTIME
Refills: 0 | Status: DISCONTINUED | OUTPATIENT
Start: 2021-02-16 | End: 2021-02-17

## 2021-02-16 RX ORDER — INSULIN LISPRO 100/ML
10 VIAL (ML) SUBCUTANEOUS
Qty: 1 | Refills: 0
Start: 2021-02-16 | End: 2021-03-17

## 2021-02-16 RX ADMIN — BICTEGRAVIR SODIUM, EMTRICITABINE, AND TENOFOVIR ALAFENAMIDE FUMARATE 1 TABLET(S): 30; 120; 15 TABLET ORAL at 11:56

## 2021-02-16 RX ADMIN — QUETIAPINE FUMARATE 12.5 MILLIGRAM(S): 200 TABLET, FILM COATED ORAL at 05:34

## 2021-02-16 RX ADMIN — INSULIN GLARGINE 28 UNIT(S): 100 INJECTION, SOLUTION SUBCUTANEOUS at 21:38

## 2021-02-16 RX ADMIN — METFORMIN HYDROCHLORIDE 500 MILLIGRAM(S): 850 TABLET ORAL at 18:13

## 2021-02-16 RX ADMIN — QUETIAPINE FUMARATE 12.5 MILLIGRAM(S): 200 TABLET, FILM COATED ORAL at 18:13

## 2021-02-16 RX ADMIN — Medication 2: at 11:56

## 2021-02-16 RX ADMIN — Medication 1 TABLET(S): at 12:03

## 2021-02-16 RX ADMIN — CHLORHEXIDINE GLUCONATE 1 APPLICATION(S): 213 SOLUTION TOPICAL at 08:27

## 2021-02-16 RX ADMIN — ENOXAPARIN SODIUM 40 MILLIGRAM(S): 100 INJECTION SUBCUTANEOUS at 11:56

## 2021-02-16 RX ADMIN — Medication 6 UNIT(S): at 11:57

## 2021-02-16 RX ADMIN — Medication 3: at 21:38

## 2021-02-16 RX ADMIN — Medication 6 UNIT(S): at 08:26

## 2021-02-16 RX ADMIN — Medication 6: at 08:25

## 2021-02-16 NOTE — DISCHARGE NOTE PROVIDER - NSDCCPCAREPLAN_GEN_ALL_CORE_FT
PRINCIPAL DISCHARGE DIAGNOSIS  Diagnosis: Diabetic ketoacidosis without coma associated with type 1 diabetes mellitus  Assessment and Plan of Treatment: - hemoglobin A1C is 11  - start Metformin 500 mg BID  - Increase Lantus to 30 U at night  - Resume Humalog 10 before each meal   - follow up with Dr. Lopez (Endocrinology)      SECONDARY DISCHARGE DIAGNOSES  Diagnosis: HIV infection, unspecified symptom status  Assessment and Plan of Treatment: - follow up with Dr. iDmas re: Altered mental status  - take all medications as prescribed  - follow up with Infectious disease     PRINCIPAL DISCHARGE DIAGNOSIS  Diagnosis: Diabetic ketoacidosis without coma associated with type 1 diabetes mellitus  Assessment and Plan of Treatment: - hemoglobin A1C is 11  - start Metformin 500 mg BID  - Increase Lantus to 28U at night  - Resume Humalog 10 before each meal   - follow up with Dr. Lopez (Endocrinology)      SECONDARY DISCHARGE DIAGNOSES  Diagnosis: Metabolic encephalopathy  Assessment and Plan of Treatment:     Diagnosis: HIV infection, unspecified symptom status  Assessment and Plan of Treatment: - follow up with Dr. Dimas re: Altered mental status  - take all medications as prescribed  - follow up with Infectious disease

## 2021-02-16 NOTE — PROGRESS NOTE ADULT - PROBLEM SELECTOR PROBLEM 1
Diabetic ketoacidosis without coma associated with type 1 diabetes mellitus
Diabetic ketoacidosis without coma associated with type 1 diabetes mellitus
Type 2 diabetes mellitus with hyperglycemia, with long-term current use of insulin
Diabetic ketoacidosis without coma associated with type 1 diabetes mellitus
Diabetic ketoacidosis without coma associated with type 1 diabetes mellitus

## 2021-02-16 NOTE — DISCHARGE NOTE PROVIDER - HOSPITAL COURSE
Patient is a 40y old  Female who presents with a chief complaint of DKA (16 Feb 2021 13:26)    HPI:  Patient is a 40F with a PMH of DM2 and HIV on Biktarvy who presents to the ED for nausea and vomiting.  Patient currently asleep but is a poor historian, mother at the bedside to provide history.  Patient has been on her prescribed insulin regimen but mother notes poor PO intake today with multiple episodes of NBNB vomiting last night.    Of note, mother notes that patient recently returned home from [facility] after being at a rehab center for about a year.  In 12/19, patient was found down and hypoglycemic for an unknown time.  Patient reportedly was in a coma for months and never returned back to baseline mental status.  Patient reportedly has a wobbly gait but walks without assistance devices.  Able to communicate basic needs but unable to string sentences together.    Minor tachycardia in ED.  Labs reveal DKA, leukocytosis, and lactic acidosis.  DKA improved in ED.  Will admit to med surg (10 Feb 2021 04:49)    SUBJECTIVE & OBJECTIVE: Pt seen and examined at bedside. She is doing well.  Acute encephalopathic workup is essentially negative.  She may follow up as an outpateint with Neurology    PHYSICAL EXAM:  ICU Vital Signs Last 24 Hrs  T(C): 36.3 (16 Feb 2021 12:18), Max: 36.7 (16 Feb 2021 00:20)  T(F): 97.4 (16 Feb 2021 12:18), Max: 98 (16 Feb 2021 00:20)  HR: 93 (16 Feb 2021 12:18) (78 - 93)  BP: 112/77 (16 Feb 2021 12:18) (108/73 - 115/75)  RR: 18 (16 Feb 2021 12:18) (16 - 18)  SpO2: 98% (16 Feb 2021 12:18) (97% - 100%)  Daily   Daily I&O's Detail    15 Feb 2021 07:01  -  16 Feb 2021 07:00  --------------------------------------------------------  IN:   Oral Fluid: 1095 mL  Total IN: 1095 mL  OUT:  Total OUT: 0 mL  Total NET: 1095 mL       16 Feb 2021 07:01  -  16 Feb 2021 13:56  --------------------------------------------------------  IN:  Oral Fluid: 500 mL   Total IN: 500 mL  OUT:  Total OUT: 0 mL  Total NET: 500 mL    GENERAL: NAD, well-groomed, well-developed  HEAD:  Atraumatic, Normocephalic  EYES: EOMI, PERRLA, conjunctiva and sclera clear  ENMT: Moist mucous membranes  NECK: Supple, No JVD  NERVOUS SYSTEM:  Alert & Oriented X3, Motor Strength 5/5 B/L upper and lower extremities; DTRs 2+ intact and symmetric  CHEST/LUNG: Clear to auscultation bilaterally; No rales, rhonchi, wheezing, or rubs  HEART: Regular rate and rhythm; No murmurs, rubs, or gallops  ABDOMEN: Soft, Nontender, Nondistended; Bowel sounds present  EXTREMITIES:  2+ Peripheral Pulses, No clubbing, cyanosis, or edema    LABS: pending Encephalopathy, Autoimmune Serum  CAPILLARY BLOOD GLUCOSE  POCT Blood Glucose.: 194 mg/dL (16 Feb 2021 11:54)  POCT Blood Glucose.: 258 mg/dL (16 Feb 2021 08:05)  POCT Blood Glucose.: 164 mg/dL (15 Feb 2021 21:30)  POCT Blood Glucose.: 129 mg/dL (15 Feb 2021 16:42)    RECENT CULTURES: Culture Results:   <10,000 CFU/mL Normal Urogenital Ashlee   RADIOLOGY & ADDITIONAL TESTS: < from: MR Head w/wo IV Cont (02.14.21 @ 14:08) >    IMPRESSION:    Very limited study as described above. Within those limits:    -No acute infarct or gross intracranial hemorrhage identified.    < end of copied text >       41yo F PMH HIV, ? Dementia, DM on insulin, admitted with DKA / catatonia.  Insulin adjusted by endocrine.  Seen by Neurology.  Mother today states pt is at baseline.  Stable for outpatient f/u.  FS well controlled.      Problem/Plan-1:  Problem: DKA  - at home patient is on Lantus 28 and 10U Humalog premeal will titrate hospital dose to the same.   - would also start Metformin 500mg BID for elevated HgbA1c and likely glucose toxicity   - d/c planning for home tomorrow.     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 (recent)  CT brain on admission no acute finding  ammonia level wnl  MRI unremarkable   Neurology following  supportive care  started Seroquel d/c home on 12.gm BID or 25 mg QHS.         Problem/Plan-3:  Problem: Hypokalemia  improved    Problem/Plan-4:  Problem: HIV  ID on board   on Biktarvy and bactrim     Per mother, pt is at baseline.  Discussed outpatient f/u with Neurology.  STable for d/c home with homecare.     Disposition: Stable for discharge.  Outpatient followup discussed.  Total time spent on discharge is  35  minutes.

## 2021-02-16 NOTE — PROGRESS NOTE ADULT - ASSESSMENT
Patient is a 40y old  Female who presents with a chief complaint of DKA (15 Feb 2021 10:24)      Problem/Plan-1:  Problem: DKA  - at home patient is on Lantus 28 and 10U Humalog premeal  - would also start Metformin on d/c for elevated HgbA1c and likely glucose toxicity     Problem/Plan-2:  Problem:  Encephalopathy with Catatonia  possibly element of HIV encephalopathy/dementia worsened by episodes of hypoglycemia. (recent)  CT brain on admission no acute finding  ammonia level wnl  f/u MRI/EEG  Neurology following  supportive care  started Seroquel in ICU, decrease to night instead of BID due to lethargy   SLP and PT eval    Problem/Plan-3:  Problem: Hypokalemia  improved    Problem/Plan-4:  Problem: HIV  ID on board   on Biktarvy and bactrim    Patient is a 40y old  Female who presents with a chief complaint of DKA (15 Feb 2021 10:24)      Problem/Plan-1:  Problem: DKA  - at home patient is on Lantus 28 and 10U Humalog premeal will titrate hospital dose to the same.   - would also start Metformin 500mg BID for elevated HgbA1c and likely glucose toxicity   - d/c planning for home tomorrow.     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 (recent)  CT brain on admission no acute finding  ammonia level wnl  f/u MRI/EEG  Neurology following  supportive care  started Seroquel d/c home on 12.gm BID or 25 mg QHS.     2/16/21 - Discussed with patient's mother Shreya.  Per her mother the patient had been previously hospitalized with Catatonia, unintentional weight loss, and altered mental status.  Per her mother, the patient has improved.  At baseline she does not speak in full sentences, is slow to respond, and can verbalize her  basic needs.  This seems to be her baseline. I have discussed this information with Neurology and my plan is to d/c home with outpatient follow up.  Will prioritize the Encephalopathy, Autoimmune Panel (serum) and plan for d/c home in the am     Problem/Plan-3:  Problem: Hypokalemia  improved    Problem/Plan-4:  Problem: HIV  ID on board   on Biktarvy and bactrim

## 2021-02-16 NOTE — DISCHARGE NOTE PROVIDER - NSDCFUADDINST_GEN_ALL_CORE_FT
It is important to see your primary physician as well as the physicians noted below within the next week to perform a comprehensive medical review.  Call their offices for an appointment as soon as you leave the hospital.  If you do not have a primary physician, contact the [facility] Physician Referral Service at [phone].  To obtain your results, you can access the [facility] at [email or web address].  Your medical issues appear to be stable at this time, but if your symptoms recur or worsen, contact your physicians and/or return to the hospital if necessary.  If you encounter any issues or questions with your medication, call your physicians before stopping the medication.  Do not drive.  Limit your diet to 2 grams of sodium daily.

## 2021-02-16 NOTE — DISCHARGE NOTE PROVIDER - NSDCMRMEDTOKEN_GEN_ALL_CORE_FT
Biktarvy oral tablet: 1 tab(s) orally once a day  HumaLOG KwikPen 200 units/mL (Concentrated) subcutaneous solution: 10 unit(s) subcutaneous 3 times a day (before meals)  Lantus Solostar Pen 100 units/mL subcutaneous solution: 30 unit(s) subcutaneous once a day (at bedtime)   metFORMIN 500 mg oral tablet: 1 tab(s) orally 2 times a day (with meals)  QUEtiapine 25 mg oral tablet: 1 tab(s) orally once a day (at bedtime), As Needed   sulfamethoxazole-trimethoprim 800 mg-160 mg oral tablet: 1 tab(s) orally once a day   Biktarvy oral tablet: 1 tab(s) orally once a day  HumaLOG KwikPen 200 units/mL (Concentrated) subcutaneous solution: 10 unit(s) subcutaneous 3 times a day (before meals)  Lantus Solostar Pen 100 units/mL subcutaneous solution: 28 unit(s) subcutaneous once a day (at bedtime)   metFORMIN 500 mg oral tablet: 1 tab(s) orally 2 times a day (with meals)  QUEtiapine 25 mg oral tablet: 1 tab(s) orally once a day (at bedtime), As Needed   sulfamethoxazole-trimethoprim 800 mg-160 mg oral tablet: 1 tab(s) orally once a day

## 2021-02-16 NOTE — PROGRESS NOTE ADULT - ASSESSMENT
40F with HIV admitted due to blood glucose level was high 472, Anion gap 21 on admission, moderate acetone, admitted to ICU with DKA without coma.     2/12: ready for transfer to floor. cd4 >200. awaiting all other labs, neuro following and appreciate recs.  2/16: doing well, takes her meds, HIV well controlled, can follow up with me outpatient for her HIV care and with neurology for flat affect, intellectual disability     1. HIV  2. Leukocytosis (resolved)  3. AMS  4. Uncontrolled DM I, Hgb A1c 10.8  5. DKA    Suggestions   - continue Biktarvy  - continue Bactrim   - HBV nonimmune and HCV negative, toxo IgG IgG negative, crytp ag serum  negative   - MRI brain negative but limited   - autoimmune encephalitis panel from serum please ensure it is sent prior to discharge  - consider transvaginal US to further assess ovarian cyst/lesion (can be done outpatient)   - monitor pt's WBC  - monitor pt's temperatures   - diabetes management per medicine/endocrine   -please have patient follow up with me [address], [address], [place], [place] Office: [phone] Fax: [phone]     Discussed with Dr. Pugh   Will sign off. Please call with questions     Julio Cesar Amado,   Cell: [phone]  Pager [phone]  Infectious Disease Attending  After 5pm/weekends please call [phone] for all inquiries and new consults

## 2021-02-17 ENCOUNTER — TRANSCRIPTION ENCOUNTER (OUTPATIENT)
Age: 41
End: 2021-02-17

## 2021-02-17 VITALS
RESPIRATION RATE: 18 BRPM | OXYGEN SATURATION: 97 % | SYSTOLIC BLOOD PRESSURE: 100 MMHG | HEART RATE: 76 BPM | TEMPERATURE: 99 F | DIASTOLIC BLOOD PRESSURE: 69 MMHG

## 2021-02-17 LAB
GLUCOSE BLDC GLUCOMTR-MCNC: 136 MG/DL — HIGH (ref 70–99)
GLUCOSE BLDC GLUCOMTR-MCNC: 150 MG/DL — HIGH (ref 70–99)
GLUCOSE BLDC GLUCOMTR-MCNC: 282 MG/DL — HIGH (ref 70–99)
GLUCOSE BLDC GLUCOMTR-MCNC: 61 MG/DL — LOW (ref 70–99)
GLUCOSE BLDC GLUCOMTR-MCNC: 62 MG/DL — LOW (ref 70–99)
GLUCOSE BLDC GLUCOMTR-MCNC: 96 MG/DL — SIGNIFICANT CHANGE UP (ref 70–99)

## 2021-02-17 PROCEDURE — 99239 HOSP IP/OBS DSCHRG MGMT >30: CPT

## 2021-02-17 RX ORDER — ENOXAPARIN SODIUM 100 MG/ML
28 INJECTION SUBCUTANEOUS
Qty: 840 | Refills: 1
Start: 2021-02-17 | End: 2021-04-17

## 2021-02-17 RX ADMIN — METFORMIN HYDROCHLORIDE 500 MILLIGRAM(S): 850 TABLET ORAL at 16:51

## 2021-02-17 RX ADMIN — CHLORHEXIDINE GLUCONATE 1 APPLICATION(S): 213 SOLUTION TOPICAL at 05:13

## 2021-02-17 RX ADMIN — ENOXAPARIN SODIUM 40 MILLIGRAM(S): 100 INJECTION SUBCUTANEOUS at 11:53

## 2021-02-17 RX ADMIN — Medication 6: at 11:53

## 2021-02-17 RX ADMIN — Medication 10 UNIT(S): at 11:52

## 2021-02-17 RX ADMIN — Medication 1 TABLET(S): at 11:53

## 2021-02-17 RX ADMIN — QUETIAPINE FUMARATE 12.5 MILLIGRAM(S): 200 TABLET, FILM COATED ORAL at 17:33

## 2021-02-17 RX ADMIN — QUETIAPINE FUMARATE 12.5 MILLIGRAM(S): 200 TABLET, FILM COATED ORAL at 05:13

## 2021-02-17 RX ADMIN — METFORMIN HYDROCHLORIDE 500 MILLIGRAM(S): 850 TABLET ORAL at 09:29

## 2021-02-17 RX ADMIN — BICTEGRAVIR SODIUM, EMTRICITABINE, AND TENOFOVIR ALAFENAMIDE FUMARATE 1 TABLET(S): 30; 120; 15 TABLET ORAL at 11:53

## 2021-02-17 NOTE — PROGRESS NOTE ADULT - PROVIDER SPECIALTY LIST ADULT
Critical Care
Endocrinology
Hospitalist
Hospitalist
Endocrinology
Neurology
Critical Care
Hospitalist
Hospitalist
Infectious Disease
Neurology
Neurology
Hospitalist
Hospitalist
Infectious Disease
Endocrinology

## 2021-02-17 NOTE — CHART NOTE - NSCHARTNOTEFT_GEN_A_CORE
Medicine Hospitalist PA Procedure Note    Patient seen and examined at bedside. Left arm cleaned with Alcohol swabs prior to removal. 4fr x 20cm SL midline catheter removed with tip intact and pressure applied for 5-10 minutes with gauze. Hemostasis achieved, no signs of active bleeding or hematoma noted. Tegaderm and gauze used to create pressure dressing to maintain pressure on midline catheter site. Patient tolerated well without complications. RN aware.

## 2021-02-17 NOTE — PROGRESS NOTE ADULT - ASSESSMENT
39yo F PMH HIV, ? Dementia, DM on insulin, admitted with DKA / catatonia.  Insulin adjusted by endocrine.  Seen by Neurology.  Mother today states pt is at baseline.  Stable for outpatient f/u.  FS well controlled.      Problem/Plan-1:  Problem: DKA  - at home patient is on Lantus 28 and 10U Humalog premeal will titrate hospital dose to the same.   - would also start Metformin 500mg BID for elevated HgbA1c and likely glucose toxicity   - d/c planning for home tomorrow.     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 (recent)  CT brain on admission no acute finding  ammonia level wnl  MRI unremarkable   Neurology following  supportive care  started Seroquel d/c home on 12.gm BID or 25 mg QHS.         Problem/Plan-3:  Problem: Hypokalemia  improved    Problem/Plan-4:  Problem: HIV  ID on board   on Biktarvy and bactrim     Per mother, pt is at baseline.  Discussed outpatient f/u with Neurology.  STable for d/c home with homecare.

## 2021-02-17 NOTE — DISCHARGE NOTE NURSING/CASE MANAGEMENT/SOCIAL WORK - REASON FOR REFUSAL (REFER PATIENT TO HEALTHCARE PROVIDER FOR FOLLOW-UP):
unable to assess immunization status Complex Repair And Modified Advancement Flap Text: The defect edges were debeveled with a #15 scalpel blade.  The primary defect was closed partially with a complex linear closure.  Given the location of the remaining defect, shape of the defect and the proximity to free margins a modified advancement flap was deemed most appropriate for complete closure of the defect.  Using a sterile surgical marker, an appropriate advancement flap was drawn incorporating the defect and placing the expected incisions within the relaxed skin tension lines where possible.    The area thus outlined was incised deep to adipose tissue with a #15 scalpel blade.  The skin margins were undermined to an appropriate distance in all directions utilizing iris scissors.

## 2021-02-17 NOTE — PROGRESS NOTE ADULT - SUBJECTIVE AND OBJECTIVE BOX
Patient is a 40y old  Female who presents with a chief complaint of DKA (14 Feb 2021 12:02)      Interval History: Diabetic Ketoacidosis resolved     MEDICATIONS  (STANDING):  bictegravir 50 mG/emtricitabine 200 mG/tenofovir alafenamide 25 mG (BIKTARVY) 1 Tablet(s) Oral daily  chlorhexidine 4% Liquid 1 Application(s) Topical <User Schedule>  dextrose 40% Gel 15 Gram(s) Oral once  dextrose 5%. 1000 milliLiter(s) (50 mL/Hr) IV Continuous <Continuous>  dextrose 5%. 1000 milliLiter(s) (100 mL/Hr) IV Continuous <Continuous>  dextrose 50% Injectable 25 Gram(s) IV Push once  dextrose 50% Injectable 12.5 Gram(s) IV Push once  dextrose 50% Injectable 25 Gram(s) IV Push once  enoxaparin Injectable 40 milliGRAM(s) SubCutaneous daily  glucagon  Injectable 1 milliGRAM(s) IntraMuscular once  insulin glargine Injectable (LANTUS) 15 Unit(s) SubCutaneous every morning  insulin lispro (ADMELOG) corrective regimen sliding scale   SubCutaneous three times a day before meals  insulin lispro (ADMELOG) corrective regimen sliding scale   SubCutaneous at bedtime  lactated ringers. 1000 milliLiter(s) (75 mL/Hr) IV Continuous <Continuous>  QUEtiapine 12.5 milliGRAM(s) Oral at bedtime  trimethoprim  160 mG/sulfamethoxazole 800 mG 1 Tablet(s) Oral daily    MEDICATIONS  (PRN):      Allergies    Certain nail polish (Swelling)  No Known Drug Allergies    Intolerances        REVIEW OF SYSTEMS:  CONSTITUTIONAL: no changes  EYES: No eye pain, visual disturbances, or discharge  ENMT:  No difficulty hearing, No sinus or throat pain  NECK: No pain or stiffness  RESPIRATORY: No cough, wheezing, chills or hemoptysis; No shortness of breath  CARDIOVASCULAR: No chest pain, palpitations or leg swelling  GASTROINTESTINAL: No abdominal or epigastric pain. No nausea, vomiting, or hematemesis; No diarrhea or constipation. No melena or hematochezia.  GENITOURINARY: No dysuria, frequency, hematuria, or incontinence  NEUROLOGICAL: No headaches, memory loss, loss of strength, numbness, or tremors  SKIN: No itching, burning, rashes, or lesions   ENDOCRINE: No heat or cold intolerance; No hair loss  MUSCULOSKELETAL: No joint pain or swelling; No muscle, back, or extremity pain  PSYCHIATRIC: No depression, anxiety, mood swings, or difficulty sleeping  HEME/LYMPH: No easy bruising, or bleeding gums  ALLERY AND IMMUNOLOGIC: No hives or eczema    Vital Signs Last 24 Hrs  T(C): 36.7 (14 Feb 2021 12:00), Max: 37 (13 Feb 2021 23:00)  T(F): 98 (14 Feb 2021 12:00), Max: 98.6 (13 Feb 2021 23:00)  HR: 83 (14 Feb 2021 12:00) (79 - 90)  BP: 109/75 (14 Feb 2021 12:00) (109/75 - 128/73)  BP(mean): --  RR: 17 (14 Feb 2021 12:00) (17 - 18)  SpO2: 96% (14 Feb 2021 12:00) (95% - 98%)    PHYSICAL EXAM:  GENERAL:   HEAD: Atraumatic, Normocephalic  EYES: PERRLA, conjunctiva and sclera clear  ENMT: No tonsillar erythema, exudates, or enlargement; Moist mucous membranes, Good dentition, No lesions  NECK: Supple, No JVD, Normal thyroid  NERVOUS SYSTEM:  Alert & Oriented X3, Good concentration; Motor Strength 5/5 B/L upper and lower extremities  CHEST/LUNG: Clear to auscultaion bilaterally; No rales, rhonchi, wheezing, or rubs  HEART: Regular rate and rhythm; No murmurs, rubs, or gallops  ABDOMEN: Soft, Nontender, Nondistended; Bowel sounds present  EXTREMITIES:  2+ Peripheral Pulses, no edema  SKIN: No rashes or lesions    LABS:        CAPILLARY BLOOD GLUCOSE      POCT Blood Glucose.: 161 mg/dL (14 Feb 2021 11:46)  POCT Blood Glucose.: 254 mg/dL (14 Feb 2021 08:12)  POCT Blood Glucose.: 233 mg/dL (13 Feb 2021 22:55)  POCT Blood Glucose.: 127 mg/dL (13 Feb 2021 16:13)    Lipid panel:           Thyroid:  Diabetes Tests:  Parathyroid Panel:  Adrenals:  RADIOLOGY & ADDITIONAL TESTS:    Imaging Personally Reviewed:  [ ] YES  [ ] NO    Consultant(s) Notes Reviewed:  [ ] YES  [ ] NO    Care Discussed with Consultants/Other Providers [ ] YES  [ ] NO
39 yo female with h/o DM and HIV on Biktarvy who presents to the ER 2 to N/V found to be in DKA.  Pt recently returned home after extensive rehabilitation and hospital courses following after being found down and hypoglycemic for unknown time in December 2019, she never returned back to baseline mental status.  Upon arrival to bedside, pt was seen to be awake but disoriented, unable to follow commands or respond to questions appropriately, repeatedly stating "I do not feel well" and attempting to get out of bed. Pt was admitted to the ICU for management of DKA. Now off insulin drip. Endo consulted. Midline placed 2/12. pt down graded to medicine floor on 2/12      INTERVAL HPI/OVERNIGHT EVENTS: pt remain non verbal mostly, and catatonic, keep saying "yes" "no" repeatedly with every questions.    MEDICATIONS  (STANDING):  bictegravir 50 mG/emtricitabine 200 mG/tenofovir alafenamide 25 mG (BIKTARVY) 1 Tablet(s) Oral daily  chlorhexidine 4% Liquid 1 Application(s) Topical <User Schedule>  dextrose 40% Gel 15 Gram(s) Oral once  dextrose 5%. 1000 milliLiter(s) (50 mL/Hr) IV Continuous <Continuous>  dextrose 5%. 1000 milliLiter(s) (100 mL/Hr) IV Continuous <Continuous>  dextrose 50% Injectable 25 Gram(s) IV Push once  dextrose 50% Injectable 12.5 Gram(s) IV Push once  dextrose 50% Injectable 25 Gram(s) IV Push once  enoxaparin Injectable 40 milliGRAM(s) SubCutaneous daily  glucagon  Injectable 1 milliGRAM(s) IntraMuscular once  insulin lispro (ADMELOG) corrective regimen sliding scale   SubCutaneous three times a day before meals  insulin lispro (ADMELOG) corrective regimen sliding scale   SubCutaneous at bedtime  lactated ringers. 1000 milliLiter(s) (75 mL/Hr) IV Continuous <Continuous>  potassium phosphate IVPB 15 milliMole(s) IV Intermittent once  QUEtiapine 12.5 milliGRAM(s) Oral two times a day  trimethoprim  160 mG/sulfamethoxazole 800 mG 1 Tablet(s) Oral daily    MEDICATIONS  (PRN):      Allergies    Certain nail polish (Swelling)  No Known Drug Allergies    Intolerances        REVIEW OF SYSTEMS:  unable due to pt's mental status      Vital Signs Last 24 Hrs  T(C): 37.2 (12 Feb 2021 15:49), Max: 37.2 (12 Feb 2021 15:49)  T(F): 99 (12 Feb 2021 15:49), Max: 99 (12 Feb 2021 15:49)  HR: 124 (12 Feb 2021 15:00) (80 - 124)  BP: 117/64 (12 Feb 2021 15:00) (88/69 - 130/75)  BP(mean): 75 (12 Feb 2021 15:00) (71 - 93)  RR: 18 (12 Feb 2021 15:00) (11 - 28)  SpO2: 98% (12 Feb 2021 15:00) (95% - 100%)    PHYSICAL EXAM:  GENERAL: NAD, well-groomed, well-developed  HEAD:  Atraumatic, Normocephalic  EYES: EOMI, PERRLA, conjunctiva and sclera clear  ENMT: No tonsillar erythema, exudates, or enlargement; Moist mucous membranes, Good dentition, No lesions  NECK: Supple, No JVD, Normal thyroid  NERVOUS SYSTEM: awake, limited speech  CHEST/LUNG: Clear to percussion bilaterally; No rales, rhonchi, wheezing, or rubs  HEART: Regular rate and rhythm; No murmurs, rubs, or gallops  ABDOMEN: Soft, Nontender, Nondistended; Bowel sounds present  EXTREMITIES:  2+ Peripheral Pulses, No clubbing, cyanosis, or edema  LYMPH: No lymphadenopathy noted  SKIN: No rashes or lesions    LABS:                        11.7   7.59  )-----------( 187      ( 12 Feb 2021 05:57 )             36.8     02-12    134<L>  |  100  |  3<L>  ----------------------------<  294<H>  3.7   |  17<L>  |  0.98    Ca    8.2<L>      12 Feb 2021 05:57  Phos  1.7     02-12  Mg     1.6     02-12    TPro  6.7  /  Alb  2.8<L>  /  TBili  0.7  /  DBili  x   /  AST  16  /  ALT  20  /  AlkPhos  118  02-12        CAPILLARY BLOOD GLUCOSE      POCT Blood Glucose.: 238 mg/dL (12 Feb 2021 16:09)  POCT Blood Glucose.: 123 mg/dL (12 Feb 2021 11:44)  POCT Blood Glucose.: 335 mg/dL (12 Feb 2021 07:48)  POCT Blood Glucose.: 195 mg/dL (11 Feb 2021 21:39)  POCT Blood Glucose.: 381 mg/dL (11 Feb 2021 17:22)      Culture - Urine (collected 10 Feb 2021 09:01)  Source: .Urine Clean Catch (Midstream)  Final Report (11 Feb 2021 08:20):    <10,000 CFU/mL Normal Urogenital Ashlee      RADIOLOGY & ADDITIONAL TESTS:    Imaging Personally Reviewed:  [ ] YES  [ ] NO    Consultant(s) Notes Reviewed:  [ ] YES  [ ] NO    Care Discussed with Consultants/Other Providers [ ] YES  [ ] NO
HPI:  Pt is a 41 yo BF with h/o DM and HIV on Biktarvy who presents to the ER 2 to N/V found to be in DKA.  Pt recently returned home after extensive rehabilitation and hospital courses following after being found down and hypoglycemic for unknown time in 2019, she never returned back to baseline mental status.  Upon arrival to bedside, pt was seen to be awake but disoriented, unable to follow commands or respond to questions appropriately, repeatedly stating "I do not feel well" and attempting to get out of bed. Pt admitted to the ICU for management of DKA      ## Labs:  CBC:                        11.7   7.59  )-----------( 187      ( 2021 05:57 )             36.8     Chem:      134<L>  |  100  |  3<L>  ----------------------------<  294<H>  3.7   |  17<L>  |  0.98    Ca    8.2<L>      2021 05:57  Phos  1.7       Mg     1.6         TPro  6.7  /  Alb  2.8<L>  /  TBili  0.7  /  DBili  x   /  AST  16  /  ALT  20  /  AlkPhos  118      Coags:          ## Imaging:    ## Medications:  bictegravir 50 mG/emtricitabine 200 mG/tenofovir alafenamide 25 mG (BIKTARVY) 1 Tablet(s) Oral daily  trimethoprim  160 mG/sulfamethoxazole 800 mG 1 Tablet(s) Oral daily        dextrose 40% Gel 15 Gram(s) Oral once  dextrose 50% Injectable 25 Gram(s) IV Push once  dextrose 50% Injectable 12.5 Gram(s) IV Push once  dextrose 50% Injectable 25 Gram(s) IV Push once  glucagon  Injectable 1 milliGRAM(s) IntraMuscular once  insulin lispro (ADMELOG) corrective regimen sliding scale   SubCutaneous three times a day before meals  insulin lispro (ADMELOG) corrective regimen sliding scale   SubCutaneous at bedtime    enoxaparin Injectable 40 milliGRAM(s) SubCutaneous daily      QUEtiapine 12.5 milliGRAM(s) Oral at bedtime      ## Vitals:  T(C): 37.2 (21 @ 15:49), Max: 37.2 (21 @ 15:49)  HR: 94 (21 @ 18:15) (80 - 124)  BP: 128/60 (21 @ 18:00) (88/69 - 128/60)  BP(mean): 79 (21 @ 18:00) (71 - 93)  RR: 21 (21 @ 18:15) (11 - 28)  SpO2: 98% (21 @ 18:15) (95% - 100%)  Wt(kg): --  Vent:   AB-11 @ 07:01  -   @ 07:00  --------------------------------------------------------  IN: 2067 mL / OUT: 1125 mL / NET: 942 mL     @ 07:01  -   @ 20:34  --------------------------------------------------------  IN: 475 mL / OUT: 0 mL / NET: 475 mL          ## P/E:  Gen: lying comfortably in bed in no apparent distress  Lungs: CTA  Heart: RRR  Abd: Soft/+BS  Ext: No edema  Neuro: Answering questions/ slow mentation    CENTRAL LINE: [ ] YES [ ] NO  LOCATION:   DATE INSERTED:  REMOVE: [ ] YES [ ] NO      COX: [ ] YES [ ] NO    DATE INSERTED:  REMOVE:  [ ] YES [ ] NO      A-LINE:  [ ] YES [ ] NO  LOCATION:   DATE INSERTED:  REMOVE:  [ ] YES [ ] NO  EXPLAIN:      CODE STATUS: [x] full code  [ ] DNR  [ ] DNI  [ ] [facility]  Goals of care discussion: [ ] yes 
Patient is a 40y old  Female who presents with a chief complaint of DKA (14 Feb 2021 15:29)      Interval History: finger sticks are ind and in high 200's   Diabetic Ketoacidosis resolved but on Lantus 15 units and Lispro sliding scale coverage with meals     MEDICATIONS  (STANDING):  bictegravir 50 mG/emtricitabine 200 mG/tenofovir alafenamide 25 mG (BIKTARVY) 1 Tablet(s) Oral daily  chlorhexidine 4% Liquid 1 Application(s) Topical <User Schedule>  dextrose 40% Gel 15 Gram(s) Oral once  dextrose 5%. 1000 milliLiter(s) (50 mL/Hr) IV Continuous <Continuous>  dextrose 5%. 1000 milliLiter(s) (100 mL/Hr) IV Continuous <Continuous>  dextrose 50% Injectable 25 Gram(s) IV Push once  dextrose 50% Injectable 12.5 Gram(s) IV Push once  dextrose 50% Injectable 25 Gram(s) IV Push once  enoxaparin Injectable 40 milliGRAM(s) SubCutaneous daily  glucagon  Injectable 1 milliGRAM(s) IntraMuscular once  insulin glargine Injectable (LANTUS) 15 Unit(s) SubCutaneous every morning  insulin lispro (ADMELOG) corrective regimen sliding scale   SubCutaneous three times a day before meals  insulin lispro (ADMELOG) corrective regimen sliding scale   SubCutaneous at bedtime  insulin lispro Injectable (ADMELOG) 4 Unit(s) SubCutaneous before lunch  insulin lispro Injectable (ADMELOG) 4 Unit(s) SubCutaneous before dinner  lactated ringers. 1000 milliLiter(s) (75 mL/Hr) IV Continuous <Continuous>  QUEtiapine 12.5 milliGRAM(s) Oral at bedtime  trimethoprim  160 mG/sulfamethoxazole 800 mG 1 Tablet(s) Oral daily    MEDICATIONS  (PRN):      Allergies    Certain nail polish (Swelling)  No Known Drug Allergies    Intolerances        REVIEW OF SYSTEMS:  CONSTITUTIONAL: no changes  EYES: No eye pain, visual disturbances, or discharge  ENMT:  No difficulty hearing, No sinus or throat pain  NECK: No pain or stiffness  RESPIRATORY: No cough, wheezing, chills or hemoptysis; No shortness of breath  CARDIOVASCULAR: No chest pain, palpitations or leg swelling  GASTROINTESTINAL: No abdominal or epigastric pain. No nausea, vomiting, or hematemesis; No diarrhea or constipation. No melena or hematochezia.  GENITOURINARY: No dysuria, frequency, hematuria, or incontinence  NEUROLOGICAL: No headaches, memory loss, loss of strength, numbness, or tremors  SKIN: No itching, burning, rashes, or lesions   ENDOCRINE: No heat or cold intolerance; No hair loss  MUSCULOSKELETAL: No joint pain or swelling; No muscle, back, or extremity pain  PSYCHIATRIC: No depression, anxiety, mood swings, or difficulty sleeping  HEME/LYMPH: No easy bruising, or bleeding gums  ALLERY AND IMMUNOLOGIC: No hives or eczema    Vital Signs Last 24 Hrs  T(C): 36.4 (15 Feb 2021 06:44), Max: 36.8 (14 Feb 2021 17:00)  T(F): 97.6 (15 Feb 2021 06:44), Max: 98.3 (14 Feb 2021 23:00)  HR: 75 (15 Feb 2021 06:44) (75 - 83)  BP: 115/81 (15 Feb 2021 06:44) (109/75 - 128/74)  BP(mean): --  RR: 18 (15 Feb 2021 06:44) (17 - 18)  SpO2: 99% (15 Feb 2021 06:44) (96% - 99%)    PHYSICAL EXAM:  GENERAL:   HEAD: Atraumatic, Normocephalic  EYES: PERRLA, conjunctiva and sclera clear  ENMT: No tonsillar erythema, exudates, or enlargement; Moist mucous membranes, Good dentition, No lesions  NECK: Supple, No JVD, Normal thyroid  NERVOUS SYSTEM:  Alert & Oriented X3, Good concentration; Motor Strength 5/5 B/L upper and lower extremities  CHEST/LUNG: Clear to auscultaion bilaterally; No rales, rhonchi, wheezing, or rubs  HEART: Regular rate and rhythm; No murmurs, rubs, or gallops  ABDOMEN: Soft, Nontender, Nondistended; Bowel sounds present  EXTREMITIES:  2+ Peripheral Pulses, no edema  SKIN: No rashes or lesions    LABS:        CAPILLARY BLOOD GLUCOSE      POCT Blood Glucose.: 215 mg/dL (15 Feb 2021 08:19)  POCT Blood Glucose.: 308 mg/dL (14 Feb 2021 20:23)  POCT Blood Glucose.: 154 mg/dL (14 Feb 2021 16:08)  POCT Blood Glucose.: 161 mg/dL (14 Feb 2021 11:46)    Lipid panel:           Thyroid:  Diabetes Tests:  Parathyroid Panel:  Adrenals:  RADIOLOGY & ADDITIONAL TESTS:    Imaging Personally Reviewed:  [ ] YES  [ ] NO    Consultant(s) Notes Reviewed:  [ ] YES  [ ] NO    Care Discussed with Consultants/Other Providers [ ] YES  [ ] NO
                     Patient: RONNIE DOYLE [id number] 40y Female                           Internal Medicine Hospitalist Progress Note    Seen with aide at bedside.  Pt nods head, denies complaints.   Offers single word answers.  Per aide, pt ambulates with assist.  Tolerating po without difficulty.  No nausea / vomiting.     ____________________PHYSICAL EXAM:  GENERAL:  NAD, alert, follows very basic commands.   HEENT: NCAT  CARDIOVASCULAR:  S1, S2  LUNGS: CTAB  ABDOMEN:  soft, (-) tenderness, (-) distension, (+) bowel sounds, (-) guarding, (-) rebound (-) rigidity  EXTREMITIES:  no cyanosis / clubbing / edema.   ____________________     VITALS:  Vital Signs Last 24 Hrs  T(C): 36.7 (2021 11:24), Max: 36.9 (2021 19:00)  T(F): 98.1 (2021 11:24), Max: 98.4 (2021 19:00)  HR: 69 (2021 11:24) (69 - 87)  BP: 100/68 (2021 11:24) (100/68 - 125/88)  BP(mean): --  RR: 18 (2021 11:24) (17 - 18)  SpO2: 96% (2021 11:24) (96% - 98%) Daily     Daily Weight in k.1 (2021 05:00)  CAPILLARY BLOOD GLUCOSE      POCT Blood Glucose.: 282 mg/dL (2021 11:20)  POCT Blood Glucose.: 150 mg/dL (2021 07:39)  POCT Blood Glucose.: 396 mg/dL (2021 21:27)  POCT Blood Glucose.: 106 mg/dL (2021 17:11)  POCT Blood Glucose.: 61 mg/dL (2021 16:53)  POCT Blood Glucose.: 57 mg/dL (2021 16:44)  POCT Blood Glucose.: 64 mg/dL (2021 16:41)    I&O's Summary    2021 07:01  -  2021 07:00  --------------------------------------------------------  IN: 740 mL / OUT: 0 mL / NET: 740 mL          BACKGROUND:  HEALTH ISSUES - PROBLEM Dx:  Type 2 diabetes mellitus with hyperglycemia, with long-term current use of insulin  Type 2 diabetes mellitus with hyperglycemia, with long-term current use of insulin    Social problem  Social problem    HIV infection, unspecified symptom status  HIV infection, unspecified symptom status    Lactic acid acidosis  Lactic acid acidosis    Diabetic ketoacidosis without coma associated with type 1 diabetes mellitus  Diabetic ketoacidosis without coma associated with type 1 diabetes mellitus          Allergies    Certain nail polish (Swelling)  No Known Drug Allergies    Intolerances      PAST MEDICAL & SURGICAL HISTORY:  HIV (human immunodeficiency virus infection)    Diabetes mellitus    Diabetes type I    No significant past surgical history    No significant past surgical history          LABS:                        MEDICATIONS:  MEDICATIONS  (STANDING):  bictegravir 50 mG/emtricitabine 200 mG/tenofovir alafenamide 25 mG (BIKTARVY) 1 Tablet(s) Oral daily  chlorhexidine 4% Liquid 1 Application(s) Topical <User Schedule>  dextrose 40% Gel 15 Gram(s) Oral once  dextrose 5%. 1000 milliLiter(s) (50 mL/Hr) IV Continuous <Continuous>  dextrose 5%. 1000 milliLiter(s) (100 mL/Hr) IV Continuous <Continuous>  dextrose 50% Injectable 25 Gram(s) IV Push once  dextrose 50% Injectable 12.5 Gram(s) IV Push once  dextrose 50% Injectable 25 Gram(s) IV Push once  enoxaparin Injectable 40 milliGRAM(s) SubCutaneous daily  glucagon  Injectable 1 milliGRAM(s) IntraMuscular once  insulin glargine Injectable (LANTUS) 28 Unit(s) SubCutaneous at bedtime  insulin lispro (ADMELOG) corrective regimen sliding scale   SubCutaneous three times a day before meals  insulin lispro (ADMELOG) corrective regimen sliding scale   SubCutaneous at bedtime  insulin lispro Injectable (ADMELOG) 10 Unit(s) SubCutaneous three times a day before meals  metFORMIN 500 milliGRAM(s) Oral two times a day with meals  QUEtiapine 12.5 milliGRAM(s) Oral two times a day  trimethoprim  160 mG/sulfamethoxazole 800 mG 1 Tablet(s) Oral daily    MEDICATIONS  (PRN):  
    Interval History - Patient seen and examined this am. No new events            Vital Signs Last 24 Hrs  T(C): 36.5 (12 Feb 2021 07:00), Max: 37 (11 Feb 2021 14:00)  T(F): 97.7 (12 Feb 2021 07:00), Max: 98.6 (11 Feb 2021 14:00)  HR: 94 (12 Feb 2021 09:00) (80 - 120)  BP: 100/61 (12 Feb 2021 09:00) (100/61 - 169/127)  BP(mean): 71 (12 Feb 2021 09:00) (71 - 135)  RR: 13 (12 Feb 2021 09:00) (13 - 28)  SpO2: 98% (12 Feb 2021 09:00) (95% - 100%)    PHYSICAL EXAM:      Mental Status - Patient is alert, awake, no verbal output tracks, does not follow commands     Cranial Nerves - PERRL, EOMI, blinks to threat ,  no gross facial asymmetry, tongue/uvula midline    Motor Exam -   B/l upper increased tone, catatonia, waxy flexibility no drift  Right lower normal tone withdraws  Left lower      nml bulk/tone    Sensory    Intact to light touch and pinprick bilaterally    Coord: deferred    Reflexes:          3+ UEs, 2+ Les toes down                                           Medications  bictegravir 50 mG/emtricitabine 200 mG/tenofovir alafenamide 25 mG (BIKTARVY) 1 Tablet(s) Oral daily  chlorhexidine 4% Liquid 1 Application(s) Topical <User Schedule>  dextrose 40% Gel 15 Gram(s) Oral once  dextrose 5%. 1000 milliLiter(s) IV Continuous <Continuous>  dextrose 5%. 1000 milliLiter(s) IV Continuous <Continuous>  dextrose 50% Injectable 25 Gram(s) IV Push once  dextrose 50% Injectable 12.5 Gram(s) IV Push once  dextrose 50% Injectable 25 Gram(s) IV Push once  enoxaparin Injectable 40 milliGRAM(s) SubCutaneous daily  glucagon  Injectable 1 milliGRAM(s) IntraMuscular once  insulin glargine Injectable (LANTUS) 5 Unit(s) SubCutaneous once  insulin lispro (ADMELOG) corrective regimen sliding scale   SubCutaneous three times a day before meals  insulin lispro (ADMELOG) corrective regimen sliding scale   SubCutaneous at bedtime  potassium phosphate / sodium phosphate Powder (PHOS-NaK) 1 Packet(s) Oral once  QUEtiapine 12.5 milliGRAM(s) Oral two times a day  trimethoprim  160 mG/sulfamethoxazole 800 mG 1 Tablet(s) Oral daily      Lab      Radiology    < from: CT Head No Cont (02.10.21 @ 02:12) >  IMPRESSION:    No acute intracranial hemorrhage, large cortical infarct or mass effect. If clinically indicated, short-term follow-up or MRI may be obtained for further evaluation.    Nonspecific left mastoid opacification. Recommend clinical correlation to assess acutemastoiditis.            DAVE IBRAHIM MD; Attending Radiologist  This document has been electronically signed. Feb 10 2021  2:38AM    < end of copied text >      
    Interval History - Patient seen and examined this am. No new events        Vital Signs Last 24 Hrs  T(C): 36.7 (14 Feb 2021 06:33), Max: 37 (13 Feb 2021 23:00)  T(F): 98 (14 Feb 2021 06:33), Max: 98.6 (13 Feb 2021 23:00)  HR: 79 (14 Feb 2021 06:33) (79 - 98)  BP: 111/77 (14 Feb 2021 06:33) (111/77 - 128/73)  BP(mean): --  RR: 18 (14 Feb 2021 06:33) (17 - 18)  SpO2: 98% (14 Feb 2021 06:33) (95% - 98%)  PHYSICAL EXAM:      Mental Status - Patient is alert, awake, says Hi very slowed respons tracks, does not follow commands     Cranial Nerves - PERRL, EOMI, blinks to threat ,  no gross facial asymmetry, tongue/uvula midline    Motor Exam -   B/l upper increased tone, catatonia, waxy flexibility no drift  Right lower normal tone withdraws  Left lower      nml bulk/tone    Sensory    Intact to light touch and pinprick bilaterally    Coord: deferred    Reflexes:          3+ UEs, 2+ Les toes down                                           MEDICATIONS  (STANDING):  bictegravir 50 mG/emtricitabine 200 mG/tenofovir alafenamide 25 mG (BIKTARVY) 1 Tablet(s) Oral daily  chlorhexidine 4% Liquid 1 Application(s) Topical <User Schedule>  dextrose 40% Gel 15 Gram(s) Oral once  dextrose 5%. 1000 milliLiter(s) (50 mL/Hr) IV Continuous <Continuous>  dextrose 5%. 1000 milliLiter(s) (100 mL/Hr) IV Continuous <Continuous>  dextrose 50% Injectable 25 Gram(s) IV Push once  dextrose 50% Injectable 12.5 Gram(s) IV Push once  dextrose 50% Injectable 25 Gram(s) IV Push once  enoxaparin Injectable 40 milliGRAM(s) SubCutaneous daily  glucagon  Injectable 1 milliGRAM(s) IntraMuscular once  insulin glargine Injectable (LANTUS) 15 Unit(s) SubCutaneous every morning  insulin lispro (ADMELOG) corrective regimen sliding scale   SubCutaneous three times a day before meals  insulin lispro (ADMELOG) corrective regimen sliding scale   SubCutaneous at bedtime  lactated ringers. 1000 milliLiter(s) (75 mL/Hr) IV Continuous <Continuous>  QUEtiapine 12.5 milliGRAM(s) Oral at bedtime  trimethoprim  160 mG/sulfamethoxazole 800 mG 1 Tablet(s) Oral daily    MEDICATIONS  (PRN):      Lab      Radiology    < from: CT Head No Cont (02.10.21 @ 02:12) >  IMPRESSION:    No acute intracranial hemorrhage, large cortical infarct or mass effect. If clinically indicated, short-term follow-up or MRI may be obtained for further evaluation.    Nonspecific left mastoid opacification. Recommend clinical correlation to assess acutemastoiditis.            DAVE IBRAHIM MD; Attending Radiologist  This document has been electronically signed. Feb 10 2021  2:38AM    < end of copied text >      
    Interval History - Patient seen and examined this am. No new events    Vital Signs Last 24 Hrs  T(C): 36.4 (15 Feb 2021 06:44), Max: 36.8 (14 Feb 2021 17:00)  T(F): 97.6 (15 Feb 2021 06:44), Max: 98.3 (14 Feb 2021 23:00)  HR: 75 (15 Feb 2021 06:44) (75 - 83)  BP: 115/81 (15 Feb 2021 06:44) (109/75 - 128/74)  BP(mean): --  RR: 18 (15 Feb 2021 06:44) (17 - 18)  SpO2: 99% (15 Feb 2021 06:44) (96% - 99%)  PHYSICAL EXAM:      Mental Status - Patient is alert, awake, says Hi very slowed respons tracks, does not follow commands     Cranial Nerves - PERRL, EOMI, blinks to threat ,  no gross facial asymmetry, tongue/uvula midline    Motor Exam -   B/l upper increased tone, catatonia, waxy flexibility no drift  Right lower normal tone withdraws  Left lower      nml bulk/tone    Sensory    Intact to light touch and pinprick bilaterally    Coord: deferred    Reflexes:          3+ UEs, 2+ Les toes down                                           MEDICATIONS  (STANDING):  bictegravir 50 mG/emtricitabine 200 mG/tenofovir alafenamide 25 mG (BIKTARVY) 1 Tablet(s) Oral daily  chlorhexidine 4% Liquid 1 Application(s) Topical <User Schedule>  dextrose 40% Gel 15 Gram(s) Oral once  dextrose 5%. 1000 milliLiter(s) (50 mL/Hr) IV Continuous <Continuous>  dextrose 5%. 1000 milliLiter(s) (100 mL/Hr) IV Continuous <Continuous>  dextrose 50% Injectable 25 Gram(s) IV Push once  dextrose 50% Injectable 12.5 Gram(s) IV Push once  dextrose 50% Injectable 25 Gram(s) IV Push once  enoxaparin Injectable 40 milliGRAM(s) SubCutaneous daily  glucagon  Injectable 1 milliGRAM(s) IntraMuscular once  insulin glargine Injectable (LANTUS) 15 Unit(s) SubCutaneous every morning  insulin lispro (ADMELOG) corrective regimen sliding scale   SubCutaneous three times a day before meals  insulin lispro (ADMELOG) corrective regimen sliding scale   SubCutaneous at bedtime  insulin lispro Injectable (ADMELOG) 4 Unit(s) SubCutaneous before lunch  insulin lispro Injectable (ADMELOG) 4 Unit(s) SubCutaneous before dinner  lactated ringers. 1000 milliLiter(s) (75 mL/Hr) IV Continuous <Continuous>  QUEtiapine 12.5 milliGRAM(s) Oral at bedtime  trimethoprim  160 mG/sulfamethoxazole 800 mG 1 Tablet(s) Oral daily    MEDICATIONS  (PRN):      Lab      Radiology    < from: CT Head No Cont (02.10.21 @ 02:12) >  IMPRESSION:    No acute intracranial hemorrhage, large cortical infarct or mass effect. If clinically indicated, short-term follow-up or MRI may be obtained for further evaluation.    Nonspecific left mastoid opacification. Recommend clinical correlation to assess acutemastoiditis.            DAVE IBRAHIM MD; Attending Radiologist  This document has been electronically signed. Feb 10 2021  2:38AM    < end of copied text >  < from: MR Head w/wo IV Cont (02.14.21 @ 14:08) >  IMPRESSION:    Very limited study as described above. Within those limits:    -No acute infarct or gross intracranial hemorrhage identified.              RYAN KENT M.D., ATTENDING RADIOLOGIST  This document has been electronically signed. Feb 14 2021  7:53PM    < end of copied text >      
HPI:  Pt is a 39 yo BF with h/o DM and HIV on Biktarvy who presents to the ER 2 to N/V found to be in DKA.  Pt recently returned home after extensive rehabilitation and hospital courses following after being found down and hypoglycemic for unknown time in December 2019, she never returned back to baseline mental status.  Upon arrival to bedside, pt was seen to be awake but disoriented, unable to follow commands or respond to questions appropriately, repeatedly stating "I do not feel well" and attempting to get out of bed. Pt admitted to the ICU for management of DKA    ## Labs:  CBC:                        11.4   14.27 )-----------( 182      ( 11 Feb 2021 04:41 )             37.8     Chem:  02-11    136  |  108  |  6<L>  ----------------------------<  131<H>  4.0   |  19<L>  |  0.99    Ca    8.3<L>      11 Feb 2021 04:41  Phos  2.3     02-11  Mg     2.0     02-11    TPro  7.1  /  Alb  3.1<L>  /  TBili  0.5  /  DBili  x   /  AST  14<L>  /  ALT  21  /  AlkPhos  125<H>  02-11    Coags:  PT/INR - ( 10 Feb 2021 00:26 )   PT: 12.1 sec;   INR: 1.05 ratio         PTT - ( 10 Feb 2021 00:26 )  PTT:43.6 sec  culture blood:  --  02-10 @ 09:01            culture sputum:     <10,000 CFU/mL Normal Urogenital Ashlee           culture urine:  -- 02-10 @ 09:01        ## Imaging:    ## Medications:  bictegravir 50 mG/emtricitabine 200 mG/tenofovir alafenamide 25 mG (BIKTARVY) 1 Tablet(s) Oral daily  trimethoprim  160 mG/sulfamethoxazole 800 mG 1 Tablet(s) Oral daily        dextrose 40% Gel 15 Gram(s) Oral once  dextrose 50% Injectable 25 Gram(s) IV Push once  dextrose 50% Injectable 12.5 Gram(s) IV Push once  dextrose 50% Injectable 25 Gram(s) IV Push once  glucagon  Injectable 1 milliGRAM(s) IntraMuscular once  insulin glargine Injectable (LANTUS) 10 Unit(s) SubCutaneous every morning  insulin lispro (ADMELOG) corrective regimen sliding scale   SubCutaneous three times a day before meals  insulin lispro (ADMELOG) corrective regimen sliding scale   SubCutaneous at bedtime    enoxaparin Injectable 40 milliGRAM(s) SubCutaneous daily      dexMEDEtomidine Infusion 0.2 MICROgram(s)/kG/Hr IV Continuous <Continuous>  ondansetron Injectable 4 milliGRAM(s) IV Push every 8 hours PRN      ## Vitals:  T(C): 36.7 (02-11-21 @ 15:16), Max: 37.7 (02-10-21 @ 19:09)  HR: 86 (02-11-21 @ 17:00) (86 - 130)  BP: 132/76 (02-11-21 @ 16:00) (98/45 - 169/127)  BP(mean): 89 (02-11-21 @ 16:00) (59 - 135)  RR: 17 (02-11-21 @ 17:00) (14 - 38)  SpO2: 99% (02-11-21 @ 17:00) (95% - 100%)  Wt(kg): --  Vent:   ABG: ABG - ( 10 Feb 2021 10:52 )  pH, Arterial: x     pH, Blood: 7.31  /  pCO2: 26    /  pO2: 85    / HCO3: 13    / Base Excess: -12.0 /  SaO2: 96                    02-10 @ 07:01  -  02-11 @ 07:00  --------------------------------------------------------  IN: 2177.3 mL / OUT: 1750 mL / NET: 427.3 mL    02-11 @ 07:01  -  02-11 @ 18:19  --------------------------------------------------------  IN: 807 mL / OUT: 475 mL / NET: 332 mL          ## P/E:  Gen: lying comfortably in bed in no apparent distress  Lungs: CTA  Heart: RRR  Abd: Soft/+BS  Ext: No edema  Neuro: Following commands but with slow mentation    CENTRAL LINE: [ ] YES [ ] NO  LOCATION:   DATE INSERTED:  REMOVE: [ ] YES [ ] NO      COX: [ ] YES [ ] NO    DATE INSERTED:  REMOVE:  [ ] YES [ ] NO      A-LINE:  [ ] YES [ ] NO  LOCATION:   DATE INSERTED:  REMOVE:  [ ] YES [ ] NO  EXPLAIN:      CODE STATUS: [x ] full code  [ ] DNR  [ ] DNI  [ ] MOLST  Goals of care discussion: [ ] yes 
Patient is a 40y old  Female who presents with a chief complaint of DKA (15 Feb 2021 11:51)    HPI:  Patient is a 40F with a PMH of DM2 and HIV on Biktarvy who presents to the ED for nausea and vomiting.  Patient currently asleep but is a poor historian, mother at the bedside to provide history.  Patient has been on her prescribed insulin regimen but mother notes poor PO intake today with multiple episodes of NBNB vomiting last night.    Of note, mother notes that patient recently returned home from [facility] after being at a rehab center for about a year.  In 12/19, patient was found down and hypoglycemic for an unknown time.  Patient reportedly was in a coma for months and never returned back to baseline mental status.  Patient reportedly has a wobbly gait but walks without assistance devices.  Able to communicate basic needs but unable to string sentences together.    Minor tachycardia in ED.  Labs reveal DKA, leukocytosis, and lactic acidosis.  DKA improved in ED.  Will admit to med surg (10 Feb 2021 04:49)    SUBJECTIVE & OBJECTIVE: Pt seen and examined at bedside. She is doing well more awake and conversant.  PHYSICAL EXAM:  Vital Signs Last 24 Hrs  T(C): 36.3 (16 Feb 2021 12:18), Max: 36.7 (16 Feb 2021 00:20)  T(F): 97.4 (16 Feb 2021 12:18), Max: 98 (16 Feb 2021 00:20)  HR: 93 (16 Feb 2021 12:18) (78 - 93)  BP: 112/77 (16 Feb 2021 12:18) (108/73 - 115/75)  RR: 18 (16 Feb 2021 12:18) (16 - 18)  SpO2: 98% (16 Feb 2021 12:18) (97% - 100%)  Daily     Daily I&O's Detail    15 Feb 2021 07:01  -  16 Feb 2021 07:00  --------------------------------------------------------  IN:    Oral Fluid: 1095 mL  Total IN: 1095 mL    OUT:  Total OUT: 0 mL    Total NET: 1095 mL      16 Feb 2021 07:01  -  16 Feb 2021 13:26  --------------------------------------------------------  IN:    Oral Fluid: 500 mL  Total IN: 500 mL    OUT:  Total OUT: 0 mL    Total NET: 500 mL        GENERAL: NAD, well-groomed, well-developed  HEAD:  Atraumatic, Normocephalic  EYES: EOMI, PERRLA, conjunctiva and sclera clear  ENMT: Moist mucous membranes  NECK: Supple, No JVD  NERVOUS SYSTEM:  Alert & with mild cognitive delay, Motor Strength 5/5 B/L upper and lower extremities; DTRs 2+ intact and symmetric  CHEST/LUNG: Clear to auscultation bilaterally; No rales, rhonchi, wheezing, or rubs  HEART: Regular rate and rhythm; No murmurs, rubs, or gallops  ABDOMEN: Soft, Nontender, Nondistended; Bowel sounds present  EXTREMITIES:  2+ Peripheral Pulses, No clubbing, cyanosis, or edema  LABS:  CAPILLARY BLOOD GLUCOSE      POCT Blood Glucose.: 194 mg/dL (16 Feb 2021 11:54)  POCT Blood Glucose.: 258 mg/dL (16 Feb 2021 08:05)  POCT Blood Glucose.: 164 mg/dL (15 Feb 2021 21:30)  POCT Blood Glucose.: 129 mg/dL (15 Feb 2021 16:42)    RECENT CULTURES:  RADIOLOGY & ADDITIONAL TESTS:  
Patient is a 40y old  Female who presents with a chief complaint of DKA (12 Feb 2021 20:33)      INTERVAL HPI/OVERNIGHT EVENTS: no events     MEDICATIONS  (STANDING):  bictegravir 50 mG/emtricitabine 200 mG/tenofovir alafenamide 25 mG (BIKTARVY) 1 Tablet(s) Oral daily  chlorhexidine 4% Liquid 1 Application(s) Topical <User Schedule>  dextrose 40% Gel 15 Gram(s) Oral once  dextrose 5%. 1000 milliLiter(s) (50 mL/Hr) IV Continuous <Continuous>  dextrose 5%. 1000 milliLiter(s) (100 mL/Hr) IV Continuous <Continuous>  dextrose 50% Injectable 25 Gram(s) IV Push once  dextrose 50% Injectable 12.5 Gram(s) IV Push once  dextrose 50% Injectable 25 Gram(s) IV Push once  enoxaparin Injectable 40 milliGRAM(s) SubCutaneous daily  glucagon  Injectable 1 milliGRAM(s) IntraMuscular once  insulin glargine Injectable (LANTUS) 15 Unit(s) SubCutaneous every morning  insulin lispro (ADMELOG) corrective regimen sliding scale   SubCutaneous three times a day before meals  insulin lispro (ADMELOG) corrective regimen sliding scale   SubCutaneous at bedtime  lactated ringers. 1000 milliLiter(s) (75 mL/Hr) IV Continuous <Continuous>  potassium chloride    Tablet ER 40 milliEquivalent(s) Oral every 4 hours  potassium chloride  10 mEq/100 mL IVPB 10 milliEquivalent(s) IV Intermittent every 1 hour  QUEtiapine 12.5 milliGRAM(s) Oral at bedtime  trimethoprim  160 mG/sulfamethoxazole 800 mG 1 Tablet(s) Oral daily    MEDICATIONS  (PRN):      Allergies    Certain nail polish (Swelling)  No Known Drug Allergies    Intolerances       Vital Signs Last 24 Hrs  T(C): 36.3 (13 Feb 2021 05:41), Max: 37.2 (12 Feb 2021 15:49)  T(F): 97.3 (13 Feb 2021 05:41), Max: 99 (12 Feb 2021 15:49)  HR: 71 (13 Feb 2021 05:41) (71 - 124)  BP: 115/77 (13 Feb 2021 05:41) (88/69 - 128/60)  BP(mean): 79 (12 Feb 2021 18:00) (74 - 90)  RR: 18 (13 Feb 2021 05:41) (11 - 23)  SpO2: 98% (13 Feb 2021 05:41) (95% - 100%)    PHYSICAL EXAM:  GENERAL: NAD, well-groomed, well-developed  HEAD:  Atraumatic, Normocephalic  EYES: EOMI, PERRLA, conjunctiva and sclera clear  ENMT: No tonsillar erythema, exudates, or enlargement; Moist mucous membranes, Good dentition, No lesions  NECK: Supple, No JVD, Normal thyroid  NERVOUS SYSTEM:  Alert answers some yes or no Q  CHEST/LUNG: Clear to percussion bilaterally; No rales, rhonchi, wheezing, or rubs  HEART: Regular rate and rhythm; No murmurs, rubs, or gallops  ABDOMEN: Soft, Nontender, Nondistended; Bowel sounds present  EXTREMITIES:  2+ Peripheral Pulses, No clubbing, cyanosis, or edema  LYMPH: No lymphadenopathy noted  SKIN: No rashes or lesions    LABS:                        12.0   6.33  )-----------( 195      ( 13 Feb 2021 07:21 )             37.3     02-13    140  |  108  |  3<L>  ----------------------------<  133<H>  2.9<LL>   |  27  |  0.93    Ca    8.4<L>      13 Feb 2021 07:21  Phos  2.9     02-13  Mg     1.6     02-13    TPro  6.7  /  Alb  2.9<L>  /  TBili  0.7  /  DBili  x   /  AST  18  /  ALT  24  /  AlkPhos  118  02-13        CAPILLARY BLOOD GLUCOSE      POCT Blood Glucose.: 129 mg/dL (13 Feb 2021 08:53)  POCT Blood Glucose.: 149 mg/dL (13 Feb 2021 07:49)  POCT Blood Glucose.: 229 mg/dL (12 Feb 2021 22:50)  POCT Blood Glucose.: 238 mg/dL (12 Feb 2021 16:09)  POCT Blood Glucose.: 123 mg/dL (12 Feb 2021 11:44)      RADIOLOGY & ADDITIONAL TESTS:    Imaging Personally Reviewed:  [ X] YES  [ ] NO    Consultant(s) Notes Reviewed:  [ X] YES  [ ] NO    Care Discussed with Consultants/Other Providers [X ] YES  [ ] NO
Patient is a 40y old  Female who presents with a chief complaint of DKA (13 Feb 2021 18:21)      INTERVAL HPI/OVERNIGHT EVENTS: no events     MEDICATIONS  (STANDING):  bictegravir 50 mG/emtricitabine 200 mG/tenofovir alafenamide 25 mG (BIKTARVY) 1 Tablet(s) Oral daily  chlorhexidine 4% Liquid 1 Application(s) Topical <User Schedule>  dextrose 40% Gel 15 Gram(s) Oral once  dextrose 5%. 1000 milliLiter(s) (50 mL/Hr) IV Continuous <Continuous>  dextrose 5%. 1000 milliLiter(s) (100 mL/Hr) IV Continuous <Continuous>  dextrose 50% Injectable 25 Gram(s) IV Push once  dextrose 50% Injectable 12.5 Gram(s) IV Push once  dextrose 50% Injectable 25 Gram(s) IV Push once  enoxaparin Injectable 40 milliGRAM(s) SubCutaneous daily  glucagon  Injectable 1 milliGRAM(s) IntraMuscular once  insulin glargine Injectable (LANTUS) 15 Unit(s) SubCutaneous every morning  insulin lispro (ADMELOG) corrective regimen sliding scale   SubCutaneous three times a day before meals  insulin lispro (ADMELOG) corrective regimen sliding scale   SubCutaneous at bedtime  lactated ringers. 1000 milliLiter(s) (75 mL/Hr) IV Continuous <Continuous>  QUEtiapine 12.5 milliGRAM(s) Oral at bedtime  trimethoprim  160 mG/sulfamethoxazole 800 mG 1 Tablet(s) Oral daily    MEDICATIONS  (PRN):      Allergies    Certain nail polish (Swelling)  No Known Drug Allergies    Intolerances       Vital Signs Last 24 Hrs  T(C): 36.7 (14 Feb 2021 06:33), Max: 37.3 (13 Feb 2021 11:27)  T(F): 98 (14 Feb 2021 06:33), Max: 99.1 (13 Feb 2021 11:27)  HR: 79 (14 Feb 2021 06:33) (79 - 98)  BP: 111/77 (14 Feb 2021 06:33) (111/77 - 128/73)  BP(mean): --  RR: 18 (14 Feb 2021 06:33) (17 - 18)  SpO2: 98% (14 Feb 2021 06:33) (95% - 98%)    PHYSICAL EXAM:  GENERAL: NAD, well-groomed, well-developed  HEAD:  Atraumatic, Normocephalic  EYES: EOMI, PERRLA, conjunctiva and sclera clear  ENMT: No tonsillar erythema, exudates, or enlargement; Moist mucous membranes, Good dentition, No lesions  NECK: Supple, No JVD, Normal thyroid  NERVOUS SYSTEM:  Alert answers some yes or no Q  CHEST/LUNG: Clear to percussion bilaterally; No rales, rhonchi, wheezing, or rubs  HEART: Regular rate and rhythm; No murmurs, rubs, or gallops  ABDOMEN: Soft, Nontender, Nondistended; Bowel sounds present  EXTREMITIES:  2+ Peripheral Pulses, No clubbing, cyanosis, or edema  LYMPH: No lymphadenopathy noted  SKIN: No rashes or lesions      LABS:                        12.0   6.33  )-----------( 195      ( 13 Feb 2021 07:21 )             37.3     02-14    137  |  103  |  2<L>  ----------------------------<  272<H>  3.7   |  26  |  0.96    Ca    8.9      14 Feb 2021 06:58  Phos  2.9     02-13  Mg     2.0     02-14    TPro  6.7  /  Alb  2.9<L>  /  TBili  0.7  /  DBili  x   /  AST  18  /  ALT  24  /  AlkPhos  118  02-13        CAPILLARY BLOOD GLUCOSE      POCT Blood Glucose.: 254 mg/dL (14 Feb 2021 08:12)  POCT Blood Glucose.: 233 mg/dL (13 Feb 2021 22:55)  POCT Blood Glucose.: 127 mg/dL (13 Feb 2021 16:13)  POCT Blood Glucose.: 246 mg/dL (13 Feb 2021 11:30)      RADIOLOGY & ADDITIONAL TESTS:    Imaging Personally Reviewed:  [ X] YES  [ ] NO    Consultant(s) Notes Reviewed:  [ X] YES  [ ] NO    Care Discussed with Consultants/Other Providers [X ] YES  [ ] NO
Patient is a 40y old  Female who presents with a chief complaint of DKA (15 Feb 2021 10:24)    HPI:  Patient is a 40F with a PMH of DM2 and HIV on Biktarvy who presents to the ED for nausea and vomiting.  Patient currently asleep but is a poor historian, mother at the bedside to provide history.  Patient has been on her prescribed insulin regimen but mother notes poor PO intake today with multiple episodes of NBNB vomiting last night.  Of note, mother notes that patient recently returned home from [facility] after being at a rehab center for about a year.  In , patient was found down and hypoglycemic for an unknown time.  Patient reportedly was in a coma for months and never returned back to baseline mental status.  Patient reportedly has a wobbly gait but walks without assistance devices.  Able to communicate basic needs but unable to string sentences together.    Minor tachycardia in ED.  Labs reveal DKA, leukocytosis, and lactic acidosis.  DKA improved in ED.  Will admit to med surg (10 Feb 2021 04:49)    SUBJECTIVE & OBJECTIVE: Pt seen and examined at bedside. Still encephalopathic  PHYSICAL EXAM:  Vital Signs Last 24 Hrs  T(C): 36.4 (15 Feb 2021 06:44), Max: 36.8 (2021 17:00)  T(F): 97.6 (15 Feb 2021 06:44), Max: 98.3 (2021 23:00)  HR: 75 (15 Feb 2021 06:44) (75 - 83)  BP: 115/81 (15 Feb 2021 06:44) (109/75 - 128/74)  RR: 18 (15 Feb 2021 06:44) (17 - 18)  SpO2: 99% (15 Feb 2021 06:44) (96% - 99%)  Daily   Daily Weight in k.3 (15 Feb 2021 06:44)I&O's Detail    GENERAL: NAD, well-groomed, well-developed  HEAD:  Atraumatic, Normocephalic  EYES: EOMI, PERRLA, conjunctiva and sclera clear  ENMT: Moist mucous membranes  NECK: Supple, No JVD  NERVOUS SYSTEM:  confused, Motor Strength 5/5 B/L upper and lower extremities; DTRs 2+ intact and symmetric  CHEST/LUNG: Clear to auscultation bilaterally; No rales, rhonchi, wheezing, or rubs  HEART: Regular rate and rhythm; No murmurs, rubs, or gallops  ABDOMEN: Soft, Nontender, Nondistended; Bowel sounds present  EXTREMITIES:  2+ Peripheral Pulses, No clubbing, cyanosis, or edema  LABS:  CAPILLARY BLOOD GLUCOSE      POCT Blood Glucose.: 215 mg/dL (15 Feb 2021 08:19)  POCT Blood Glucose.: 308 mg/dL (2021 20:23)  POCT Blood Glucose.: 154 mg/dL (2021 16:08)  POCT Blood Glucose.: 161 mg/dL (2021 11:46)    RECENT CULTURES:  RADIOLOGY & ADDITIONAL TESTS:  
RONNIE DOYLE  MRN-[id number]    Follow Up:  HIV    Interval History: doing better, back to baseline. Able to express simple needs. Requests to go home.     ROS:    [ ] Unobtainable because:  [ ] All other systems negative    Constitutional: no fever, no chills  Head: no trauma  Eyes: no vision changes, no eye pain  ENT:  no sore throat, no rhinorrhea  Cardiovascular:  no chest pain, no palpitation  Respiratory:  no SOB, no cough  GI:  no abd pain, no vomiting, no diarrhea  urinary: no dysuria, no hematuria, no flank pain  musculoskeletal:  no joint pain, no joint swelling  skin:  no rash  neurology:  no headache, no seizure, no change in mental status  psych: no anxiety, no depression         Allergies  Certain nail polish (Swelling)  No Known Drug Allergies        ANTIMICROBIALS:  bictegravir 50 mG/emtricitabine 200 mG/tenofovir alafenamide 25 mG (BIKTARVY) 1 daily  trimethoprim  160 mG/sulfamethoxazole 800 mG 1 daily      OTHER MEDS:  chlorhexidine 4% Liquid 1 Application(s) Topical <User Schedule>  dextrose 40% Gel 15 Gram(s) Oral once  dextrose 5%. 1000 milliLiter(s) IV Continuous <Continuous>  dextrose 5%. 1000 milliLiter(s) IV Continuous <Continuous>  dextrose 50% Injectable 25 Gram(s) IV Push once  dextrose 50% Injectable 12.5 Gram(s) IV Push once  dextrose 50% Injectable 25 Gram(s) IV Push once  enoxaparin Injectable 40 milliGRAM(s) SubCutaneous daily  glucagon  Injectable 1 milliGRAM(s) IntraMuscular once  insulin glargine Injectable (LANTUS) 28 Unit(s) SubCutaneous at bedtime  insulin lispro (ADMELOG) corrective regimen sliding scale   SubCutaneous three times a day before meals  insulin lispro (ADMELOG) corrective regimen sliding scale   SubCutaneous at bedtime  insulin lispro Injectable (ADMELOG) 10 Unit(s) SubCutaneous three times a day before meals  metFORMIN 500 milliGRAM(s) Oral two times a day with meals  QUEtiapine 12.5 milliGRAM(s) Oral two times a day      Physical Exam:  Vital Signs Last 24 Hrs  T(C): 36.3 (16 Feb 2021 12:18), Max: 36.7 (16 Feb 2021 00:20)  T(F): 97.4 (16 Feb 2021 12:18), Max: 98 (16 Feb 2021 00:20)  HR: 93 (16 Feb 2021 12:18) (78 - 93)  BP: 112/77 (16 Feb 2021 12:18) (108/73 - 115/75)  BP(mean): --  RR: 18 (16 Feb 2021 12:18) (16 - 18)  SpO2: 98% (16 Feb 2021 12:18) (97% - 100%)    General:    NAD,  non toxic  Head: atraumatic, normocephalic  Eye: normal sclera and conjunctiva  ENT:    no oral lesions, neck supple  Cardio:     regular S1, S2,  no murmur  Respiratory:    clear b/l,    no wheezing  abd:     soft,   BS +,   no tenderness  :   no CVAT,  no suprapubic tenderness,   no  cordero  Musculoskeletal:   no joint swelling,   no edema  vascular: no central lines, +PIV   Skin:    no rash  Neurologic:     slow to respond, not much sense   psych: flat affect    WBC Count: 6.33 K/uL (02-13 @ 07:21)  WBC Count: 7.59 K/uL (02-12 @ 05:57)  WBC Count: 14.27 K/uL (02-11 @ 04:41)  WBC Count: 20.79 K/uL (02-10 @ 14:46)  WBC Count: 18.17 K/uL (02-10 @ 08:27)  WBC Count: 15.33 K/uL (02-10 @ 00:26)    Creatinine Trend: 0.96<--, 0.93<--, 0.98<--, 0.99<--, 1.00<--, 1.00<--      MICROBIOLOGY:  v  .Urine Clean Catch (Midstream)  02-10-21   <10,000 CFU/mL Normal Urogenital Ashlee  --  --    HIV-1 RNA Quantitative, Viral Load Log: NOT DET. lg /mL (02-13-21 @ 01:18)  Toxoplasma IgG Screen: <3.0 IU/mL (02-12-21 @ 12:46)  HIV-1 RNA Quantitative, Viral Load Log: NOT DET. lg /mL (02-11-21 @ 20:23)      Rapid RVP Result: NotDetec (02-09 @ 23:50)    T Cell Subset (02.11.21 @ 18:39)    CD3 %: 69 %    CD4 %: 14 %    CD8 %: 51 %    4/8 Ratio: 0.27 RATIO    ABS CD3: 1263 /uL    ABS CD4: 252 /uL    ABS CD8: 931 /uL    RADIOLOGY:  < from: MR Head w/wo IV Cont (02.14.21 @ 14:08) >  IMPRESSION:    Very limited study as described above. Within those limits:    -No acute infarct or gross intracranial hemorrhage identified.    < end of copied text >    
RONNIE DOYLE  N-[id number]    Follow Up:  HIV    Interval History: being transferred out of ICU, cd4 >200    ROS:    [ X] Unobtainable because: poor mental status   [ ] All other systems negative    Constitutional: no fever, no chills  Head: no trauma  Eyes: no vision changes, no eye pain  ENT:  no sore throat, no rhinorrhea  Cardiovascular:  no chest pain, no palpitation  Respiratory:  no SOB, no cough  GI:  no abd pain, no vomiting, no diarrhea  urinary: no dysuria, no hematuria, no flank pain  musculoskeletal:  no joint pain, no joint swelling  skin:  no rash  neurology:  no headache, no seizure, no change in mental status  psych: no anxiety, no depression         Allergies  Certain nail polish (Swelling)  No Known Drug Allergies        ANTIMICROBIALS:  bictegravir 50 mG/emtricitabine 200 mG/tenofovir alafenamide 25 mG (BIKTARVY) 1 daily  trimethoprim  160 mG/sulfamethoxazole 800 mG 1 daily      OTHER MEDS:  chlorhexidine 4% Liquid 1 Application(s) Topical <User Schedule>  dextrose 40% Gel 15 Gram(s) Oral once  dextrose 5%. 1000 milliLiter(s) IV Continuous <Continuous>  dextrose 5%. 1000 milliLiter(s) IV Continuous <Continuous>  dextrose 50% Injectable 25 Gram(s) IV Push once  dextrose 50% Injectable 12.5 Gram(s) IV Push once  dextrose 50% Injectable 25 Gram(s) IV Push once  enoxaparin Injectable 40 milliGRAM(s) SubCutaneous daily  glucagon  Injectable 1 milliGRAM(s) IntraMuscular once  insulin lispro (ADMELOG) corrective regimen sliding scale   SubCutaneous three times a day before meals  insulin lispro (ADMELOG) corrective regimen sliding scale   SubCutaneous at bedtime  lactated ringers. 1000 milliLiter(s) IV Continuous <Continuous>  potassium phosphate IVPB 15 milliMole(s) IV Intermittent once  QUEtiapine 12.5 milliGRAM(s) Oral two times a day      Physical Exam:  Vital Signs Last 24 Hrs  T(C): 37.2 (12 Feb 2021 15:49), Max: 37.2 (12 Feb 2021 15:49)  T(F): 99 (12 Feb 2021 15:49), Max: 99 (12 Feb 2021 15:49)  HR: 124 (12 Feb 2021 15:00) (80 - 124)  BP: 117/64 (12 Feb 2021 15:00) (88/69 - 130/75)  BP(mean): 75 (12 Feb 2021 15:00) (71 - 93)  RR: 18 (12 Feb 2021 15:00) (11 - 28)  SpO2: 98% (12 Feb 2021 15:00) (95% - 100%)    General:    NAD,  non toxic  Head: atraumatic, normocephalic  Eye: normal sclera and conjunctiva  ENT:    no oral lesions, neck supple  Cardio:     regular S1, S2,  no murmur  Respiratory:    clear b/l,    no wheezing  abd:     soft,   BS +,   no tenderness  :   no CVAT,  no suprapubic tenderness,   no  cordero  Musculoskeletal:   no joint swelling,   no edema  vascular: no central lines, +PIV   Skin:    no rash  Neurologic:     slow to respond, not much sense   psych: normal affect    WBC Count: 7.59 K/uL (02-12 @ 05:57)  WBC Count: 14.27 K/uL (02-11 @ 04:41)  WBC Count: 20.79 K/uL (02-10 @ 14:46)  WBC Count: 18.17 K/uL (02-10 @ 08:27)  WBC Count: 15.33 K/uL (02-10 @ 00:26)                            11.7   7.59  )-----------( 187      ( 12 Feb 2021 05:57 )             36.8       02-12    134<L>  |  100  |  3<L>  ----------------------------<  294<H>  3.7   |  17<L>  |  0.98    Ca    8.2<L>      12 Feb 2021 05:57  Phos  1.7     02-12  Mg     1.6     02-12    TPro  6.7  /  Alb  2.8<L>  /  TBili  0.7  /  DBili  x   /  AST  16  /  ALT  20  /  AlkPhos  118  02-12          Creatinine Trend: 0.98<--, 0.99<--, 1.00<--, 1.00<--, 0.94<--, 1.11<--      MICROBIOLOGY:  v  .Urine Clean Catch (Midstream)  02-10-21   <10,000 CFU/mL Normal Urogenital Ashlee  --  --      T Cell Subset (02.11.21 @ 18:39)    CD3 %: 69 %    CD4 %: 14 %    CD8 %: 51 %    4/8 Ratio: 0.27 RATIO    ABS CD3: 1263 /uL    ABS CD4: 252 /uL    ABS CD8: 931 /uL          Rapid RVP Result: NotDetec (02-09 @ 23:50)      RADIOLOGY:    
Patient is a 40y old  Female who presents with a chief complaint of DKA (11 Feb 2021 18:19)      INTERVAL HPI/OVERNIGHT EVENTS:  no events overnight  pt arousable  but confused, poor po intake      MEDICATIONS  (STANDING):  bictegravir 50 mG/emtricitabine 200 mG/tenofovir alafenamide 25 mG (BIKTARVY) 1 Tablet(s) Oral daily  chlorhexidine 4% Liquid 1 Application(s) Topical <User Schedule>  dextrose 40% Gel 15 Gram(s) Oral once  dextrose 5%. 1000 milliLiter(s) (50 mL/Hr) IV Continuous <Continuous>  dextrose 5%. 1000 milliLiter(s) (100 mL/Hr) IV Continuous <Continuous>  dextrose 50% Injectable 25 Gram(s) IV Push once  dextrose 50% Injectable 12.5 Gram(s) IV Push once  dextrose 50% Injectable 25 Gram(s) IV Push once  enoxaparin Injectable 40 milliGRAM(s) SubCutaneous daily  glucagon  Injectable 1 milliGRAM(s) IntraMuscular once  insulin glargine Injectable (LANTUS) 5 Unit(s) SubCutaneous once  insulin lispro (ADMELOG) corrective regimen sliding scale   SubCutaneous three times a day before meals  insulin lispro (ADMELOG) corrective regimen sliding scale   SubCutaneous at bedtime  potassium phosphate / sodium phosphate Powder (PHOS-NaK) 1 Packet(s) Oral once  QUEtiapine 25 milliGRAM(s) Oral two times a day  trimethoprim  160 mG/sulfamethoxazole 800 mG 1 Tablet(s) Oral daily    MEDICATIONS  (PRN):      REVIEW OF SYSTEMS:  pt confused unabel to provide RIS      Vital Signs Last 24 Hrs  T(C): 36.5 (12 Feb 2021 07:00), Max: 37 (11 Feb 2021 14:00)  T(F): 97.7 (12 Feb 2021 07:00), Max: 98.6 (11 Feb 2021 14:00)  HR: 94 (12 Feb 2021 09:00) (80 - 128)  BP: 100/61 (12 Feb 2021 09:00) (100/61 - 169/127)  BP(mean): 71 (12 Feb 2021 09:00) (71 - 135)  RR: 13 (12 Feb 2021 09:00) (13 - 28)  SpO2: 98% (12 Feb 2021 09:00) (95% - 100%)    PHYSICAL EXAM:  GENERAL: NAD, well-groomed, well-developed        LABS:                        11.7   7.59  )-----------( 187      ( 12 Feb 2021 05:57 )             36.8     02-12    134<L>  |  100  |  3<L>  ----------------------------<  294<H>  3.7   |  17<L>  |  0.98    Ca    8.2<L>      12 Feb 2021 05:57  Phos  1.7     02-12  Mg     1.6     02-12    TPro  6.7  /  Alb  2.8<L>  /  TBili  0.7  /  DBili  x   /  AST  16  /  ALT  20  /  AlkPhos  118  02-12        CAPILLARY BLOOD GLUCOSE      POCT Blood Glucose.: 335 mg/dL (12 Feb 2021 07:48)  POCT Blood Glucose.: 195 mg/dL (11 Feb 2021 21:39)  POCT Blood Glucose.: 381 mg/dL (11 Feb 2021 17:22)  POCT Blood Glucose.: 279 mg/dL (11 Feb 2021 12:55)  POCT Blood Glucose.: 141 mg/dL (11 Feb 2021 11:33)  POCT Blood Glucose.: 125 mg/dL (11 Feb 2021 10:02)    Lipid panel:       ABG - ( 10 Feb 2021 10:52 )  pH, Arterial: x     pH, Blood: 7.31  /  pCO2: 26    /  pO2: 85    / HCO3: 13    / Base Excess: -12.0 /  SaO2: 96                      RADIOLOGY & ADDITIONAL TESTS:  
Patient is a 40y old  Female who presents with a chief complaint of DKA (13 Feb 2021 10:20)      Interval History: finger sticks are stable , Finger-sticks are in high 100's and low 200's   on Lantus 15 units and Lispro sliding scale coverage with meals   Diabetic Ketoacidosis resolved     MEDICATIONS  (STANDING):  bictegravir 50 mG/emtricitabine 200 mG/tenofovir alafenamide 25 mG (BIKTARVY) 1 Tablet(s) Oral daily  chlorhexidine 4% Liquid 1 Application(s) Topical <User Schedule>  dextrose 40% Gel 15 Gram(s) Oral once  dextrose 5%. 1000 milliLiter(s) (50 mL/Hr) IV Continuous <Continuous>  dextrose 5%. 1000 milliLiter(s) (100 mL/Hr) IV Continuous <Continuous>  dextrose 50% Injectable 25 Gram(s) IV Push once  dextrose 50% Injectable 12.5 Gram(s) IV Push once  dextrose 50% Injectable 25 Gram(s) IV Push once  enoxaparin Injectable 40 milliGRAM(s) SubCutaneous daily  glucagon  Injectable 1 milliGRAM(s) IntraMuscular once  insulin glargine Injectable (LANTUS) 15 Unit(s) SubCutaneous every morning  insulin lispro (ADMELOG) corrective regimen sliding scale   SubCutaneous three times a day before meals  insulin lispro (ADMELOG) corrective regimen sliding scale   SubCutaneous at bedtime  lactated ringers. 1000 milliLiter(s) (75 mL/Hr) IV Continuous <Continuous>  QUEtiapine 12.5 milliGRAM(s) Oral at bedtime  trimethoprim  160 mG/sulfamethoxazole 800 mG 1 Tablet(s) Oral daily    MEDICATIONS  (PRN):      Allergies    Certain nail polish (Swelling)  No Known Drug Allergies    Intolerances        REVIEW OF SYSTEMS:  CONSTITUTIONAL: no changes  EYES: No eye pain, visual disturbances, or discharge  ENMT:  No difficulty hearing, No sinus or throat pain  NECK: No pain or stiffness      Vital Signs Last 24 Hrs  T(C): 36.7 (13 Feb 2021 16:25), Max: 37.3 (13 Feb 2021 11:27)  T(F): 98.1 (13 Feb 2021 16:25), Max: 99.1 (13 Feb 2021 11:27)  HR: 90 (13 Feb 2021 16:25) (71 - 98)  BP: 120/81 (13 Feb 2021 16:25) (112/79 - 122/75)  BP(mean): --  RR: 18 (13 Feb 2021 16:25) (17 - 20)  SpO2: 95% (13 Feb 2021 16:25) (95% - 98%)    PHYSICAL EXAM:  GENERAL:   HEAD: Atraumatic, Normocephalic  EYES: PERRLA, conjunctiva and sclera clear  ENMT: No tonsillar erythema, exudates, or enlargement; Moist mucous membranes, Good dentition, No lesions  NECK: Supple, No JVD, Normal thyroid  NERVOUS SYSTEM:  Alert & Oriented X3, Good concentration; Motor Strength 5/5 B/L upper and lower extremities  CHEST/LUNG: Clear to auscultaion bilaterally; No rales, rhonchi, wheezing, or rubs  HEART: Regular rate and rhythm; No murmurs, rubs, or gallops  ABDOMEN: Soft, Nontender, Nondistended; Bowel sounds present  EXTREMITIES:  2+ Peripheral Pulses, no edema  SKIN: No rashes or lesions    LABS:        CAPILLARY BLOOD GLUCOSE      POCT Blood Glucose.: 127 mg/dL (13 Feb 2021 16:13)  POCT Blood Glucose.: 246 mg/dL (13 Feb 2021 11:30)  POCT Blood Glucose.: 129 mg/dL (13 Feb 2021 08:53)  POCT Blood Glucose.: 149 mg/dL (13 Feb 2021 07:49)  POCT Blood Glucose.: 229 mg/dL (12 Feb 2021 22:50)    Lipid panel:           Thyroid:  Diabetes Tests:  Parathyroid Panel:  Adrenals:  RADIOLOGY & ADDITIONAL TESTS:    Imaging Personally Reviewed:  [ ] YES  [ ] NO    Consultant(s) Notes Reviewed:  [ ] YES  [ ] NO    Care Discussed with Consultants/Other Providers [ ] YES  [ ] NO
Patient is a 40y old  Female who presents with a chief complaint of DKA (16 Feb 2021 15:17)      Interval History: fingersticks were earlier low so prandial lispro was discontinued.  Last fingerstick is elevated  Patient is otherwise on 10 units of Lantus and Lispro sliding scale coverage with meals   Diabetic Ketoacidosis resolved     MEDICATIONS  (STANDING):  bictegravir 50 mG/emtricitabine 200 mG/tenofovir alafenamide 25 mG (BIKTARVY) 1 Tablet(s) Oral daily  chlorhexidine 4% Liquid 1 Application(s) Topical <User Schedule>  dextrose 40% Gel 15 Gram(s) Oral once  dextrose 5%. 1000 milliLiter(s) (50 mL/Hr) IV Continuous <Continuous>  dextrose 5%. 1000 milliLiter(s) (100 mL/Hr) IV Continuous <Continuous>  dextrose 50% Injectable 25 Gram(s) IV Push once  dextrose 50% Injectable 12.5 Gram(s) IV Push once  dextrose 50% Injectable 25 Gram(s) IV Push once  enoxaparin Injectable 40 milliGRAM(s) SubCutaneous daily  glucagon  Injectable 1 milliGRAM(s) IntraMuscular once  insulin glargine Injectable (LANTUS) 28 Unit(s) SubCutaneous at bedtime  insulin lispro (ADMELOG) corrective regimen sliding scale   SubCutaneous three times a day before meals  insulin lispro (ADMELOG) corrective regimen sliding scale   SubCutaneous at bedtime  insulin lispro Injectable (ADMELOG) 10 Unit(s) SubCutaneous three times a day before meals  metFORMIN 500 milliGRAM(s) Oral two times a day with meals  QUEtiapine 12.5 milliGRAM(s) Oral two times a day  trimethoprim  160 mG/sulfamethoxazole 800 mG 1 Tablet(s) Oral daily    MEDICATIONS  (PRN):      Allergies    Certain nail polish (Swelling)  No Known Drug Allergies    Intolerances        REVIEW OF SYSTEMS:  CONSTITUTIONAL: no changes  EYES: No eye pain, visual disturbances, or discharge  ENMT:  No difficulty hearing, No sinus or throat pain  NECK: No pain or stiffness  RESPIRATORY: No cough, wheezing, chills or hemoptysis; No shortness of breath  CARDIOVASCULAR: No chest pain, palpitations or leg swelling  GASTROINTESTINAL: No abdominal or epigastric pain. No nausea, vomiting, or hematemesis; No diarrhea or constipation. No melena or hematochezia.  GENITOURINARY: No dysuria, frequency, hematuria, or incontinence  NEUROLOGICAL: No headaches, memory loss, loss of strength, numbness, or tremors  SKIN: No itching, burning, rashes, or lesions   ENDOCRINE: No heat or cold intolerance; No hair loss  MUSCULOSKELETAL: No joint pain or swelling; No muscle, back, or extremity pain  PSYCHIATRIC: No depression, anxiety, mood swings, or difficulty sleeping  HEME/LYMPH: No easy bruising, or bleeding gums  ALLERY AND IMMUNOLOGIC: No hives or eczema    Vital Signs Last 24 Hrs  T(C): 36.9 (16 Feb 2021 19:00), Max: 36.9 (16 Feb 2021 19:00)  T(F): 98.4 (16 Feb 2021 19:00), Max: 98.4 (16 Feb 2021 19:00)  HR: 84 (16 Feb 2021 19:00) (78 - 93)  BP: 111/75 (16 Feb 2021 19:00) (108/73 - 112/77)  BP(mean): --  RR: 18 (16 Feb 2021 19:00) (17 - 18)  SpO2: 96% (16 Feb 2021 19:00) (96% - 99%)    PHYSICAL EXAM:  GENERAL:   HEAD: Atraumatic, Normocephalic  EYES: PERRLA, conjunctiva and sclera clear  ENMT: No tonsillar erythema, exudates, or enlargement; Moist mucous membranes, Good dentition, No lesions  NECK: Supple, No JVD, Normal thyroid  NERVOUS SYSTEM:  Alert & Oriented X3, Good concentration; Motor Strength 5/5 B/L upper and lower extremities  CHEST/LUNG: Clear to auscultaion bilaterally; No rales, rhonchi, wheezing, or rubs  HEART: Regular rate and rhythm; No murmurs, rubs, or gallops  ABDOMEN: Soft, Nontender, Nondistended; Bowel sounds present  EXTREMITIES:  2+ Peripheral Pulses, no edema  SKIN: No rashes or lesions    LABS:        CAPILLARY BLOOD GLUCOSE      POCT Blood Glucose.: 396 mg/dL (16 Feb 2021 21:27)  POCT Blood Glucose.: 106 mg/dL (16 Feb 2021 17:11)  POCT Blood Glucose.: 61 mg/dL (16 Feb 2021 16:53)  POCT Blood Glucose.: 57 mg/dL (16 Feb 2021 16:44)  POCT Blood Glucose.: 64 mg/dL (16 Feb 2021 16:41)  POCT Blood Glucose.: 194 mg/dL (16 Feb 2021 11:54)  POCT Blood Glucose.: 258 mg/dL (16 Feb 2021 08:05)    Lipid panel:           Thyroid:  Diabetes Tests:  Parathyroid Panel:  Adrenals:  RADIOLOGY & ADDITIONAL TESTS:    Imaging Personally Reviewed:  [ ] YES  [ ] NO    Consultant(s) Notes Reviewed:  [ ] YES  [ ] NO    Care Discussed with Consultants/Other Providers [ ] YES  [ ] NO

## 2021-02-19 ENCOUNTER — FORM ENCOUNTER (OUTPATIENT)
Age: 41
End: 2021-02-19

## 2021-02-25 DIAGNOSIS — G93.49 OTHER ENCEPHALOPATHY: ICD-10-CM

## 2021-02-25 DIAGNOSIS — Z86.16 PERSONAL HISTORY OF COVID-19: ICD-10-CM

## 2021-02-25 DIAGNOSIS — Z21 ASYMPTOMATIC HUMAN IMMUNODEFICIENCY VIRUS [HIV] INFECTION STATUS: ICD-10-CM

## 2021-02-25 DIAGNOSIS — E10.10 TYPE 1 DIABETES MELLITUS WITH KETOACIDOSIS WITHOUT COMA: ICD-10-CM

## 2021-02-25 DIAGNOSIS — E83.39 OTHER DISORDERS OF PHOSPHORUS METABOLISM: ICD-10-CM

## 2021-02-25 DIAGNOSIS — F02.80 DEMENTIA IN OTHER DISEASES CLASSIFIED ELSEWHERE, UNSPECIFIED SEVERITY, WITHOUT BEHAVIORAL DISTURBANCE, PSYCHOTIC DISTURBANCE, MOOD DISTURBANCE, AND ANXIETY: ICD-10-CM

## 2021-02-25 DIAGNOSIS — F06.1 CATATONIC DISORDER DUE TO KNOWN PHYSIOLOGICAL CONDITION: ICD-10-CM

## 2021-02-25 DIAGNOSIS — Z79.4 LONG TERM (CURRENT) USE OF INSULIN: ICD-10-CM

## 2021-02-25 DIAGNOSIS — G92 TOXIC ENCEPHALOPATHY: ICD-10-CM

## 2021-02-25 DIAGNOSIS — R76.0 RAISED ANTIBODY TITER: ICD-10-CM

## 2021-02-25 DIAGNOSIS — E87.6 HYPOKALEMIA: ICD-10-CM

## 2021-10-28 ENCOUNTER — EMERGENCY (EMERGENCY)
Facility: HOSPITAL | Age: 41
LOS: 0 days | Discharge: HOME | End: 2021-10-28
Attending: EMERGENCY MEDICINE
Payer: MEDICAID

## 2021-10-28 VITALS
TEMPERATURE: 99 F | RESPIRATION RATE: 17 BRPM | HEIGHT: 62 IN | HEART RATE: 87 BPM | WEIGHT: 134.92 LBS | SYSTOLIC BLOOD PRESSURE: 109 MMHG | DIASTOLIC BLOOD PRESSURE: 73 MMHG | OXYGEN SATURATION: 100 %

## 2021-10-28 DIAGNOSIS — E11.9 TYPE 2 DIABETES MELLITUS WITHOUT COMPLICATIONS: ICD-10-CM
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
## 2021-10-28 DIAGNOSIS — S30.811A ABRASION OF ABDOMINAL WALL, INITIAL ENCOUNTER: ICD-10-CM
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
## 2021-10-28 DIAGNOSIS — Z21 ASYMPTOMATIC HUMAN IMMUNODEFICIENCY VIRUS [HIV] INFECTION STATUS: ICD-10-CM

## 2021-10-28 DIAGNOSIS — M25.561 PAIN IN RIGHT KNEE: ICD-10-CM

## 2021-10-28 DIAGNOSIS — S83.91XA SPRAIN OF UNSPECIFIED SITE OF RIGHT KNEE, INITIAL ENCOUNTER: ICD-10-CM

## 2021-10-28 DIAGNOSIS — Z79.84 LONG TERM (CURRENT) USE OF ORAL HYPOGLYCEMIC DRUGS: ICD-10-CM

## 2021-10-28 DIAGNOSIS — Z87.820 PERSONAL HISTORY OF TRAUMATIC BRAIN INJURY: ICD-10-CM

## 2021-10-28 DIAGNOSIS — Z79.4 LONG TERM (CURRENT) USE OF INSULIN: ICD-10-CM

## 2021-10-28 LAB
GLUCOSE BLDC GLUCOMTR-MCNC: 70 MG/DL — SIGNIFICANT CHANGE UP (ref 70–99)
HCG UR QL: NEGATIVE — SIGNIFICANT CHANGE UP

## 2021-10-28 PROCEDURE — 71045 X-RAY EXAM CHEST 1 VIEW: CPT | Mod: 26

## 2021-10-28 PROCEDURE — 99285 EMERGENCY DEPT VISIT HI MDM: CPT

## 2021-10-28 PROCEDURE — 72125 CT NECK SPINE W/O DYE: CPT | Mod: 26,MH

## 2021-10-28 PROCEDURE — 74176 CT ABD & PELVIS W/O CONTRAST: CPT | Mod: 26,MH

## 2021-10-28 PROCEDURE — 70450 CT HEAD/BRAIN W/O DYE: CPT | Mod: 26,MH

## 2021-10-28 PROCEDURE — 73562 X-RAY EXAM OF KNEE 3: CPT | Mod: 26,RT

## 2021-10-28 RX ORDER — ACETAMINOPHEN 500 MG
650 TABLET ORAL ONCE
Refills: 0 | Status: COMPLETED | OUTPATIENT
Start: 2021-10-28 | End: 2021-10-28

## 2021-10-28 RX ADMIN — Medication 650 MILLIGRAM(S): at 17:26

## 2021-10-28 RX ADMIN — Medication 650 MILLIGRAM(S): at 19:00
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
## 2021-10-28 NOTE — ED ADULT NURSE NOTE - NSIMPLEMENTINTERV_GEN_ALL_ED
Implemented All Fall Risk Interventions:  [place] to call system. Call bell, personal items and telephone within reach. Instruct patient to call for assistance. Room bathroom lighting operational. Non-slip footwear when patient is off stretcher. Physically safe environment: no spills, clutter or unnecessary equipment. Stretcher in lowest position, wheels locked, appropriate side rails in place. Provide visual cue, wrist band, yellow gown, etc. Monitor gait and stability. Monitor for mental status changes and reorient to person, place, and time. Review medications for side effects contributing to fall risk. Reinforce activity limits and safety measures with patient and family.

## 2021-10-28 NOTE — ED PROVIDER NOTE - PATIENT PORTAL LINK FT
You can access the FollowMyHealth Patient Portal offered by [facility] by registering at the following website: [email or web address]. By joining [employer]’s FollowMyHealth portal, you will also be able to view your health information using other applications (apps) compatible with our system.
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
## 2021-10-28 NOTE — ED PROVIDER NOTE - NSFOLLOWUPINSTRUCTIONS_ED_ALL_ED_FT
YOU HAVE A LARGE OVARIAN CYST ~6CM. PLEASE FOLLOW UP WITH OBGYN AND RETURN IF WORSENING SEVERE LOWER ABDOMINAL PAIN.     you can put bacitracin 2-3 times a day to abdominal abrasion.     Follow up with your primary care doctor within the next 24-48 hours and bring copy of your results.  Return to the Emergency Department for worsening or persistent symptoms or any other concerns incl. severe headache, confusion, abdominal pain, chest pain, shortness of breath, dizziness, inability to tolerate oral intake.  Rest, drink plenty of fluids.  Advance activity as tolerated.  Continue all previously prescribed medications as directed. You can use  Tylenol 650 mg every 4 hours for pain/fever (Max 4g/day of tylenol)        Additionally, you may use the following web address to find a [facility] physician close to you: [email or web address]

## 2021-10-28 NOTE — ED PROVIDER NOTE - CARE PROVIDER_API CALL
Sampson Almonte (DO)  Orthopaedic Surgery  [address] [place] [address]  Phone: [phone]  Fax: [phone]  Follow Up Time:     Rosendo Cheatham  OBSTETRICS AND GYNECOLOGY  [address] [place] [address]  Phone: [phone]  Fax: [phone]  Follow Up Time:
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
## 2021-10-28 NOTE — ED PROVIDER NOTE - PHYSICAL EXAMINATION
Gen: Alert, Well appearing. NAD    Head: NC, AT, PERRL, normal lids/conjunctiva , no scalp hematoma  ENT: patent oropharynx without erythema/exudate, uvula midline  Neck: supple, no tenderness/meningismus  Pulm: Bilateral clear BS, normal resp effort  CV: RRR, no M/R/G, +dist pulses   Abd: soft, NT/ND, +BS, no guarding/rebound tenderness, + small 2cm linear abrasion to left abd, nontender  Mskel: extremities x4 with normal ROM. no ctl spine ttp. no edema/erythema/cyanosis , mild lateral knee tenderness, though FROM of knee, no swelling/abrasion/bruising.  Skin: no bruising  Neuro: AAOx3, no sensory/motor deficits, CN 2-12 intact Gen: Alert, Well appearing. NAD    Head: NC, AT, PERRL, normal lids/conjunctiva , no scalp hematoma  ENT: patent oropharynx without erythema/exudate, uvula midline  Neck: supple, no tenderness/meningismus  Pulm: Bilateral clear BS, normal resp effort  CV: RRR, no M/R/G, +dist pulses   Abd: soft, NT/ND, +BS, no guarding/rebound tenderness, + small 2cm superficial abrasion to left abd, nontender  Mskel: extremities x4 with normal ROM. no ctl spine ttp. no edema/erythema/cyanosis , mild lateral knee tenderness, though FROM of knee, no swelling/abrasion/bruising.  Skin: no bruising  Neuro: AAOx3, no sensory/motor deficits, CN 2-12 intact

## 2021-11-04 ENCOUNTER — APPOINTMENT (OUTPATIENT)
Dept: ORTHOPEDIC SURGERY | Facility: CLINIC | Age: 41
End: 2021-11-04

## 2022-06-27 ENCOUNTER — EMERGENCY (EMERGENCY)
Facility: HOSPITAL | Age: 42
LOS: 0 days | Discharge: HOME | End: 2022-06-28
Attending: STUDENT IN AN ORGANIZED HEALTH CARE EDUCATION/TRAINING PROGRAM

## 2022-06-27 VITALS
RESPIRATION RATE: 17 BRPM | DIASTOLIC BLOOD PRESSURE: 63 MMHG | HEART RATE: 67 BPM | TEMPERATURE: 98 F | HEIGHT: 65 IN | SYSTOLIC BLOOD PRESSURE: 97 MMHG | WEIGHT: 130.07 LBS | OXYGEN SATURATION: 98 %

## 2022-06-27 DIAGNOSIS — I10 ESSENTIAL (PRIMARY) HYPERTENSION: ICD-10-CM

## 2022-06-27 DIAGNOSIS — E11.65 TYPE 2 DIABETES MELLITUS WITH HYPERGLYCEMIA: ICD-10-CM

## 2022-06-27 DIAGNOSIS — E78.5 HYPERLIPIDEMIA, UNSPECIFIED: ICD-10-CM

## 2022-06-27 DIAGNOSIS — K30 FUNCTIONAL DYSPEPSIA: ICD-10-CM

## 2022-06-27 LAB
ALBUMIN SERPL ELPH-MCNC: 3.4 G/DL — SIGNIFICANT CHANGE UP (ref 3.3–5)
ALP SERPL-CCNC: 90 U/L — SIGNIFICANT CHANGE UP (ref 40–120)
ALT FLD-CCNC: 15 U/L — SIGNIFICANT CHANGE UP (ref 12–78)
ANION GAP SERPL CALC-SCNC: 8 MMOL/L — SIGNIFICANT CHANGE UP (ref 5–17)
ANISOCYTOSIS BLD QL: SLIGHT — SIGNIFICANT CHANGE UP
AST SERPL-CCNC: 15 U/L — SIGNIFICANT CHANGE UP (ref 15–37)
BASOPHILS # BLD AUTO: 0.09 K/UL — SIGNIFICANT CHANGE UP (ref 0–0.2)
BASOPHILS NFR BLD AUTO: 1 % — SIGNIFICANT CHANGE UP (ref 0–2)
BILIRUB SERPL-MCNC: 0.5 MG/DL — SIGNIFICANT CHANGE UP (ref 0.2–1.2)
BUN SERPL-MCNC: 10 MG/DL — SIGNIFICANT CHANGE UP (ref 7–23)
CALCIUM SERPL-MCNC: 9.1 MG/DL — SIGNIFICANT CHANGE UP (ref 8.5–10.1)
CHLORIDE SERPL-SCNC: 101 MMOL/L — SIGNIFICANT CHANGE UP (ref 96–108)
CO2 SERPL-SCNC: 26 MMOL/L — SIGNIFICANT CHANGE UP (ref 22–31)
CREAT SERPL-MCNC: 0.8 MG/DL — SIGNIFICANT CHANGE UP (ref 0.5–1.3)
EGFR: 96 ML/MIN/1.73M2 — SIGNIFICANT CHANGE UP
EOSINOPHIL # BLD AUTO: 0.15 K/UL — SIGNIFICANT CHANGE UP (ref 0–0.5)
EOSINOPHIL NFR BLD AUTO: 1.7 % — SIGNIFICANT CHANGE UP (ref 0–6)
ETHANOL SERPL-MCNC: 10 MG/DL — SIGNIFICANT CHANGE UP (ref 0–10)
GLUCOSE BLDC GLUCOMTR-MCNC: 250 MG/DL — HIGH (ref 70–99)
GLUCOSE SERPL-MCNC: 213 MG/DL — HIGH (ref 70–99)
HCG SERPL-ACNC: <1 MIU/ML — SIGNIFICANT CHANGE UP
HCT VFR BLD CALC: 35.4 % — SIGNIFICANT CHANGE UP (ref 34.5–45)
HGB BLD-MCNC: 10.7 G/DL — LOW (ref 11.5–15.5)
HYPOCHROMIA BLD QL: SLIGHT — SIGNIFICANT CHANGE UP
IMM GRANULOCYTES NFR BLD AUTO: 0.2 % — SIGNIFICANT CHANGE UP (ref 0–1.5)
LIDOCAIN IGE QN: 60 U/L — LOW (ref 73–393)
LYMPHOCYTES # BLD AUTO: 3.01 K/UL — SIGNIFICANT CHANGE UP (ref 1–3.3)
LYMPHOCYTES # BLD AUTO: 35 % — SIGNIFICANT CHANGE UP (ref 13–44)
MACROCYTES BLD QL: SLIGHT — SIGNIFICANT CHANGE UP
MANUAL SMEAR VERIFICATION: SIGNIFICANT CHANGE UP
MCHC RBC-ENTMCNC: 20.2 PG — LOW (ref 27–34)
MCHC RBC-ENTMCNC: 30.2 G/DL — LOW (ref 32–36)
MCV RBC AUTO: 66.7 FL — LOW (ref 80–100)
MONOCYTES # BLD AUTO: 0.82 K/UL — SIGNIFICANT CHANGE UP (ref 0–0.9)
MONOCYTES NFR BLD AUTO: 9.5 % — SIGNIFICANT CHANGE UP (ref 2–14)
NEUTROPHILS # BLD AUTO: 4.51 K/UL — SIGNIFICANT CHANGE UP (ref 1.8–7.4)
NEUTROPHILS NFR BLD AUTO: 52.6 % — SIGNIFICANT CHANGE UP (ref 43–77)
NRBC # BLD: 0 /100 WBCS — SIGNIFICANT CHANGE UP (ref 0–0)
OVALOCYTES BLD QL SMEAR: SLIGHT — SIGNIFICANT CHANGE UP
PLAT MORPH BLD: NORMAL — SIGNIFICANT CHANGE UP
PLATELET # BLD AUTO: 262 K/UL — SIGNIFICANT CHANGE UP (ref 150–400)
POIKILOCYTOSIS BLD QL AUTO: SLIGHT — SIGNIFICANT CHANGE UP
POTASSIUM SERPL-MCNC: 3.7 MMOL/L — SIGNIFICANT CHANGE UP (ref 3.5–5.3)
POTASSIUM SERPL-SCNC: 3.7 MMOL/L — SIGNIFICANT CHANGE UP (ref 3.5–5.3)
PROT SERPL-MCNC: 8 GM/DL — SIGNIFICANT CHANGE UP (ref 6–8.3)
RBC # BLD: 5.31 M/UL — HIGH (ref 3.8–5.2)
RBC # FLD: 19.1 % — HIGH (ref 10.3–14.5)
RBC BLD AUTO: ABNORMAL
SODIUM SERPL-SCNC: 135 MMOL/L — SIGNIFICANT CHANGE UP (ref 135–145)
WBC # BLD: 8.6 K/UL — SIGNIFICANT CHANGE UP (ref 3.8–10.5)
WBC # FLD AUTO: 8.6 K/UL — SIGNIFICANT CHANGE UP (ref 3.8–10.5)

## 2022-06-27 PROCEDURE — 99285 EMERGENCY DEPT VISIT HI MDM: CPT

## 2022-06-27 RX ORDER — SODIUM CHLORIDE 9 MG/ML
1000 INJECTION INTRAMUSCULAR; INTRAVENOUS; SUBCUTANEOUS ONCE
Refills: 0 | Status: COMPLETED | OUTPATIENT
Start: 2022-06-27 | End: 2022-06-27

## 2022-06-27 RX ADMIN — SODIUM CHLORIDE 1000 MILLILITER(S): 9 INJECTION INTRAMUSCULAR; INTRAVENOUS; SUBCUTANEOUS at 21:09

## 2022-06-27 NOTE — ED ADULT NURSE NOTE - NSIMPLEMENTINTERV_GEN_ALL_ED
Implemented All Fall Risk Interventions:  [place] to call system. Call bell, personal items and telephone within reach. Instruct patient to call for assistance. Room bathroom lighting operational. Non-slip footwear when patient is off stretcher. Physically safe environment: no spills, clutter or unnecessary equipment. Stretcher in lowest position, wheels locked, appropriate side rails in place. Provide visual cue, wrist band, yellow gown, etc. Monitor gait and stability. Monitor for mental status changes and reorient to person, place, and time. Review medications for side effects contributing to fall risk. Reinforce activity limits and safety measures with patient and family.
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
## 2022-06-28 NOTE — ED PROVIDER NOTE - PHYSICAL EXAMINATION
VITAL SIGNS: I have reviewed nursing notes and confirm.   GEN: Well-developed; well-nourished; in no acute distress. Speaking full sentences.  SKIN: Warm, pink, no rash, no diaphoresis, no cyanosis, well perfused.   HEAD: Normocephalic; atraumatic. No scalp lacerations, no abrasions.  NECK: Supple; non tender.   EYES: Pupils 3mm equal, round, reactive to light and accomodation, conjunctiva and sclera clear. Extra-ocular movements intact bilaterally.  ENT: No nasal discharge; airway clear. Trachea is midline. ORAL: No oropharyngeal exudates or erythema. Normal dentition.  CV: Regular rate and rhythm. S1, S2 normal; no murmurs, gallops, or rubs. No lower extremity pitting edema bilaterally. Capillary refill < 2 seconds throughout. Distal pulses intact 2+ throughout.  RESP: CTA bilaterally. No wheezes, rales, or rhonchi.   ABD: Normal bowel sounds, soft, non-distended, non-tender, no rebound, no guarding, no rigidity, no hepatosplenomegaly. No CVA tenderness bilaterally.  MSK: Normal range of motion and movement of all 4 extremities. No joint or muscular pain throughout. No clubbing.   BACK: No thoracolumbar midline or paravertebral tenderness. No step-offs or obvious deformities.  NEURO: Alert & oriented x 3, Grossly unremarkable. Sensory and motor intact throughout. No focal deficits. Gait: Fluid. Normal speech and coordination.   PSYCH: Cooperative, appropriate. VITAL SIGNS: I have reviewed nursing notes and confirm.   GEN: Well-developed; (+) disheveled; in no acute distress. Speaking full sentences.  SKIN: Warm, pink, no rash, no diaphoresis, no cyanosis, well perfused.   HEAD: Normocephalic; atraumatic. No scalp lacerations, no abrasions.  NECK: Supple; non tender.   EYES: Pupils 3mm equal, round, reactive to light and accomodation, conjunctiva and sclera clear. Extra-ocular movements intact bilaterally.  ENT: No nasal discharge; airway clear. Trachea is midline. ORAL: No oropharyngeal exudates or erythema. Normal dentition.  CV: Regular rate and rhythm. S1, S2 normal; no murmurs, gallops, or rubs. No lower extremity pitting edema bilaterally. Capillary refill < 2 seconds throughout. Distal pulses intact 2+ throughout.  RESP: CTA bilaterally. No wheezes, rales, or rhonchi.   ABD: Normal bowel sounds, soft, non-distended, non-tender, no rebound, no guarding, no rigidity, no hepatosplenomegaly. No CVA tenderness bilaterally.  MSK: Normal range of motion and movement of all 4 extremities. No joint or muscular pain throughout. No clubbing.   BACK: No thoracolumbar midline or paravertebral tenderness. No step-offs or obvious deformities.  NEURO: Alert & oriented x 3, Grossly unremarkable. Sensory and motor intact throughout. No focal deficits. Gait: Fluid. Normal speech and coordination.   PSYCH: Cooperative, appropriate.

## 2022-06-28 NOTE — ED ADULT NURSE REASSESSMENT NOTE - NS ED NURSE REASSESS COMMENT FT1
per cheri baird to d/c w/o urine specimen. IV removed. pt ambulates independently with steady gait. pt to be d/c.

## 2022-06-28 NOTE — ED PROVIDER NOTE - PATIENT PORTAL LINK FT
You can access the FollowMyHealth Patient Portal offered by [facility] by registering at the following website: [email or web address]. By joining [employer]’s FollowMyHealth portal, you will also be able to view your health information using other applications (apps) compatible with our system.
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
## 2022-06-28 NOTE — ED PROVIDER NOTE - CARE PLAN
1 Principal Discharge DX:	Evaluation by medical service required  Secondary Diagnosis:	Hyperglycemia

## 2022-06-28 NOTE — ED PROVIDER NOTE - CLINICAL SUMMARY MEDICAL DECISION MAKING FREE TEXT BOX
After ED evaluation, there is no evidence of acute emergency pathology which would necessitate emergency hospital admission. Patient was advised to follow up with primary care physician as soon as possible and have the doctor review all ED results and arrange appropriate follow up. Patient advised to return to ED if symptoms worsen.

## 2022-06-28 NOTE — ED PROVIDER NOTE - PROGRESS NOTE DETAILS
Glucose mild elevated 200. otherwise, not in DKA or HONK. Well appearing, tolerating oral intake, vitals stable. I have discussed with the patient about the ED workup, lab results, diagnostics results, plan for discharge home, need for follow-up with primary care physician/specialists, and return precautions. At this time, the patient does not require further workup in the ED. The patient is subjectively feeling better and would like to be discharged home. The patient had the opportunity to ask questions and I have answered all inquiries. The patient verbalizes understanding and agreement with the plan. The patient is hemodynamically stable, clinically well-appearing, ambulatory, mentating well and ready for discharge home. No

## 2022-06-28 NOTE — ED ADULT NURSE REASSESSMENT NOTE - NS ED NURSE REASSESS COMMENT FT1
pt d/c. pt states she is look for phone. pt not observed to have any belonging while in ED. per triage, pt brought pt d/c. pt states she is looking for phone. pt not observed to have any belongings while in ED. per triage, pt brought in from street by EMS and was not observed with belongings in triage. pt bed searched, no belongings found. security aware. ANM Jose aware.

## 2022-08-02 NOTE — ED ADULT NURSE NOTE - PAIN RATING/NUMBER SCALE (0-10): REST
0 Sski Pregnancy And Lactation Text: This medication is Pregnancy Category D and isn't considered safe during pregnancy. It is excreted in breast milk.

## 2022-09-26 ENCOUNTER — EMERGENCY (EMERGENCY)
Facility: HOSPITAL | Age: 42
LOS: 0 days | Discharge: HOME | End: 2022-09-26
Attending: EMERGENCY MEDICINE

## 2022-09-26 VITALS
OXYGEN SATURATION: 99 % | TEMPERATURE: 98 F | RESPIRATION RATE: 18 BRPM | DIASTOLIC BLOOD PRESSURE: 70 MMHG | HEART RATE: 60 BPM | SYSTOLIC BLOOD PRESSURE: 128 MMHG

## 2022-09-26 VITALS
OXYGEN SATURATION: 99 % | HEIGHT: 62 IN | HEART RATE: 67 BPM | RESPIRATION RATE: 18 BRPM | TEMPERATURE: 98 F | SYSTOLIC BLOOD PRESSURE: 136 MMHG | WEIGHT: 123.9 LBS | DIASTOLIC BLOOD PRESSURE: 75 MMHG

## 2022-09-26 DIAGNOSIS — E10.649 TYPE 1 DIABETES MELLITUS WITH HYPOGLYCEMIA WITHOUT COMA: ICD-10-CM

## 2022-09-26 DIAGNOSIS — E03.9 HYPOTHYROIDISM, UNSPECIFIED: ICD-10-CM

## 2022-09-26 DIAGNOSIS — Z91.09 OTHER ALLERGY STATUS, OTHER THAN TO DRUGS AND BIOLOGICAL SUBSTANCES: ICD-10-CM

## 2022-09-26 DIAGNOSIS — Z79.4 LONG TERM (CURRENT) USE OF INSULIN: ICD-10-CM

## 2022-09-26 DIAGNOSIS — B20 HUMAN IMMUNODEFICIENCY VIRUS [HIV] DISEASE: ICD-10-CM

## 2022-09-26 DIAGNOSIS — Z79.84 LONG TERM (CURRENT) USE OF ORAL HYPOGLYCEMIC DRUGS: ICD-10-CM

## 2022-09-26 LAB
GLUCOSE BLDC GLUCOMTR-MCNC: 287 MG/DL — HIGH (ref 70–99)
GLUCOSE BLDC GLUCOMTR-MCNC: 76 MG/DL — SIGNIFICANT CHANGE UP (ref 70–99)
GLUCOSE BLDC GLUCOMTR-MCNC: 94 MG/DL — SIGNIFICANT CHANGE UP (ref 70–99)

## 2022-09-26 PROCEDURE — 99283 EMERGENCY DEPT VISIT LOW MDM: CPT

## 2022-09-26 NOTE — ED PROVIDER NOTE - CLINICAL SUMMARY MEDICAL DECISION MAKING FREE TEXT BOX
Noted on repeat finger stick improving, on final finger stick sugar is 280. Offered labs further woke up for hypoglycemia but refused, states she would like to leave if sugar improves.

## 2022-09-26 NOTE — ED ADULT NURSE NOTE - OBJECTIVE STATEMENT
took insulin  today FS 36 at bus stop Left arm IV given d50 given IVP, found altered and crying at [place]. H/O HIV, DM. pt is alert at this time. denies no symptoms

## 2022-09-26 NOTE — ED ADULT TRIAGE NOTE - SPO2 (%)
99 Patient identified.  Erika Singh is a 68 year old female presenting with   Chief Complaint   Patient presents with   • Follow-up     no concerns  telephone visit        PAIN: How much pain have you had because of your arthritis/disease in the past week?    NO PAIN [] [] [] [x] [] [] [] [] [] [] [] SEVERE PAIN    0 1 2 3 4 5 6 7 8 9 10      DISEASE ACTIVITY:  Considering all the ways your arthritis/disease affects you?  VERY WELL [] [] [x] [] [] [] [] [] [] [] [] VERY POORLY    0 1 2 3 4 5 6 7 8 9 10      FATIGUE:  How much of a problem has unusual fatigue or tiredness been for you in the past week?    NO PROBLEM [x] [] [] [] [] [] [] [] [] [] [] MAJOR PROBLEM    0 1 2 3 4 5 6 7 8 9 10      Are you currently pregnant?  No  Is there any possibility that you could be pregnant?  No  Are you planning on becoming pregnant within the next year? No     There were no vitals taken for this visit.      Medications have been reviewed and updated    Denies known Latex allergy or symptoms of Latex sensitivity.  Tobacco use verified.    Health Maintenance Due   Topic Date Due   • Shingles Vaccine (2 of 3) 12/22/2014       Patient would like communication of their results via:     Home Phone: [phone] (home)  Okay to leave a message containing results? Yes

## 2022-09-26 NOTE — ED PROVIDER NOTE - PATIENT PORTAL LINK FT
You can access the FollowMyHealth Patient Portal offered by [facility] by registering at the following website: [email or web address]. By joining [employer]’s FollowMyHealth portal, you will also be able to view your health information using other applications (apps) compatible with our system.

## 2022-09-26 NOTE — ED ADULT TRIAGE NOTE - CHIEF COMPLAINT QUOTE
took insulin  today FS 36 at bus stop Left arm IV given d50 given IVP, text ing on cell phone at triage, found altered and crying at Bus Stop H/O HIV, DM

## 2022-09-26 NOTE — ED PROVIDER NOTE - NSFOLLOWUPINSTRUCTIONS_ED_ALL_ED_FT
Hypoglycemia in a Person with Diabetes    WHAT YOU NEED TO KNOW:    Hypoglycemia is a serious condition that happens when your blood glucose (sugar) level drops too low. The blood sugar level is usually too high in a person with diabetes, but the level can also drop too low. It is important to follow your diabetes management plan to keep your blood sugar level steady.    DISCHARGE INSTRUCTIONS:    Have someone call your local emergency number (911 in the [place]) if:   •You have a seizure or pass out.      •Your blood sugar is less than 50 mg/dL and does not respond to treatment.      •You feel you are going to pass out.      •You have trouble thinking clearly.      Call your doctor or diabetes care team provider if:   •You have had symptoms of low blood sugar several times.      •You have questions about the amount of insulin or diabetes medicine you are taking.      •You have questions or concerns about your condition or care.      Medicines:   •Insulin or diabetes medicine help to keep your blood sugar under control.      •Glucagon may be needed if you have severe hypoglycemia.      •Take your medicine as directed. Contact your healthcare provider if you think your medicine is not helping or if you have side effects. Tell your provider if you are allergic to any medicine. Keep a list of the medicines, vitamins, and herbs you take. Include the amounts, and when and why you take them. Bring the list or the pill bottles to follow-up visits. Carry your medicine list with you in case of an emergency.      Manage hypoglycemia:   •Check your blood sugar level right away if you have symptoms of hypoglycemia. Hypoglycemia usually happens when your blood sugar level is 70 mg/dL or below. Ask your diabetes care team provider what blood sugar level is too low for you.      •If your blood sugar level is too low, eat or drink 15 grams of fast-acting carbohydrate. Examples of this amount of fast-acting carbohydrate are 4 ounces (½ cup) of fruit juice or 4 ounces of regular soda. Other examples are 2 tablespoons of raisins or 1 tube of glucose gel.  Ways to Raise Your Blood Sugar     Check your blood sugar level 15 minutes later. Sit still as you wait. If the level is still low (less than 100 mg/dL), have another 15 grams of carbohydrate. When the level returns to 100 mg/dL, eat a meal if it is time. If your meal time is more than 1 hour away, eat a snack. The snack should contain carbohydrates, such as the following:?3/4 cup of cereal      ?1 cup of skim or low fat milk      ?6 soda crackers      ?1/2 of a turkey sandwich      ?15 fat-free chips  This will help prevent another drop in blood sugar. Always carefully follow your provider's instructions on how to treat low blood sugar levels.    •Always carry a source of fast-acting carbohydrate. If you have symptoms of hypoglycemia and you do not have a blood glucose meter, have a source of fast-acting carbohydrate anyway. Avoid carbohydrate foods that are high in fat. The fat content may make the carbohydrate take longer to increase your blood sugar level. Ask your provider if you should carry a glucagon kit. Glucagon is a medicine that is injected when you develop severe hypoglycemia and become unconscious. Check the expiration date every month and replace it before it expires.      •Teach others how to help you if you have symptoms of hypoglycemia. Tell them about the symptoms of hypoglycemia. Ask them to give you a source of fast-acting carbohydrate if you cannot get it yourself. Ask them to give you a glucagon injection if you have signs of hypoglycemia and you become unconscious or have a seizure. Ask them to call the local emergency number (911 in the ). This is an emergency. Tell them never to try to make you swallow anything if you faint or have a seizure.      •Wear medical alert jewelry or carry a card that says you have diabetes. Ask where to get these items.  Medical Alert Jewelry           Prevent hypoglycemia:   •Take diabetes medicine as directed. Take your medicine at the right time and in the right amount. Do not double the amount of medicine you take unless instructed by your diabetes care team provider. You may need oral diabetes medicine, insulin, or both to help control your blood sugar levels. Your healthcare provider will teach you how and when to take oral diabetes medicine. You will also be taught about side effects oral diabetes medicine can cause. Insulin may be added if oral diabetes medicine becomes less effective over time. Insulin may be injected, or given through a pump or pen. You and your care team will discuss which method is best for you.?An insulin pump is an implanted device that gives your insulin 24 hours a day. An insulin pump prevents the need for multiple insulin injections in a day.             ?An insulin pen is a device prefilled with the right amount of insulin.  Insulin Pen            •Eat meals and snacks as directed. Talk to your dietitian or provider about a meal plan that is right for you. Do not skip meals.  Plate Method           •Check your blood sugar level as directed. Ask your provider what your blood sugar levels should be before and after you eat. Ask when and how often to check your blood sugar level. You may need to check a drop of blood in a glucose test machine. You may need to check at least 3 times each day. Record your blood sugar level results and take the record with you when you see your care team. They may use it to make changes to your medicine, food, or exercise schedules. Your care team provider may recommend a continuous glucose monitor (CGM). A CGM is a device that is worn at all times. The CGM checks your blood sugar every 5 minutes. It sends results to an electronic device such as a smart phone.  How to check your blood sugar       Continuous Glucose Monitoring            •Check your blood sugar level before you exercise. Physical activity, such as exercise, can decrease your blood sugar level. If your blood sugar level is less than 100 mg/dL, have a carbohydrate snack. Examples are 4 to 6 crackers, ½ banana, 8 ounces (1 cup) of nonfat or 1% milk, or 4 ounces (½ cup) of juice. If you will be active for more than 1 hour, you may need to check your blood sugar level every 30 minutes. Your provider may also recommend that you check your blood sugar level after your activity.      •Know the risks if you choose to drink alcohol. Alcohol can cause your blood sugar levels to be low if you use insulin. Alcohol can cause high blood sugar levels and weight gain if you drink too much. Women 21 years or older and men 65 years or older should limit alcohol to 1 drink a day. Men aged 21 to 64 years should limit alcohol to 2 drinks a day. A drink of alcohol is 12 ounces of beer, 5 ounces of wine, or 1½ ounces of liquor.      Follow up with your doctor or diabetes care team provider as directed: You may need your insulin or diabetes medicine changed if you continue to have hypoglycemia episodes. Write down your questions so you remember to ask them during your visits.

## 2022-09-26 NOTE — ED PROVIDER NOTE - OBJECTIVE STATEMENT
Pt is a 41 year old female w/PMH of HIV, DM, hypothyroidism, who presents to the ED today for hypoglycemia. Pt states she did have breakfast today, had taken insulin previously and was given dextrose by ambulance. Pt was found on the ground crying and with altered mental status. Pt is awake alert and has no complaints. Denies CP, body aches, fevers, chills, dizziness, LOC, coughs. Denies SI/HI, visual/auditory hallucinations.

## 2023-02-24 ENCOUNTER — INPATIENT (INPATIENT)
Facility: HOSPITAL | Age: 43
LOS: 3 days | Discharge: HOME | End: 2023-02-28
Attending: HOSPITALIST | Admitting: HOSPITALIST
Payer: COMMERCIAL

## 2023-02-24 VITALS
HEIGHT: 62 IN | OXYGEN SATURATION: 99 % | DIASTOLIC BLOOD PRESSURE: 53 MMHG | HEART RATE: 105 BPM | SYSTOLIC BLOOD PRESSURE: 119 MMHG | RESPIRATION RATE: 19 BRPM | WEIGHT: 110.01 LBS

## 2023-02-24 LAB
ALBUMIN SERPL ELPH-MCNC: 4.3 G/DL — SIGNIFICANT CHANGE UP (ref 3.3–5)
ALP SERPL-CCNC: 93 U/L — SIGNIFICANT CHANGE UP (ref 40–120)
ALT FLD-CCNC: 24 U/L — SIGNIFICANT CHANGE UP (ref 12–78)
AMMONIA BLD-MCNC: 15 UMOL/L — SIGNIFICANT CHANGE UP (ref 11–32)
ANION GAP SERPL CALC-SCNC: 9 MMOL/L — SIGNIFICANT CHANGE UP (ref 5–17)
ANISOCYTOSIS BLD QL: SIGNIFICANT CHANGE UP
APTT BLD: 31.9 SEC — SIGNIFICANT CHANGE UP (ref 27.5–35.5)
AST SERPL-CCNC: 24 U/L — SIGNIFICANT CHANGE UP (ref 15–37)
BASOPHILS # BLD AUTO: 0.07 K/UL — SIGNIFICANT CHANGE UP (ref 0–0.2)
BASOPHILS NFR BLD AUTO: 0.8 % — SIGNIFICANT CHANGE UP (ref 0–2)
BILIRUB SERPL-MCNC: 0.5 MG/DL — SIGNIFICANT CHANGE UP (ref 0.2–1.2)
BUN SERPL-MCNC: 10 MG/DL — SIGNIFICANT CHANGE UP (ref 7–23)
CALCIUM SERPL-MCNC: 9.4 MG/DL — SIGNIFICANT CHANGE UP (ref 8.5–10.1)
CHLORIDE SERPL-SCNC: 103 MMOL/L — SIGNIFICANT CHANGE UP (ref 96–108)
CO2 SERPL-SCNC: 24 MMOL/L — SIGNIFICANT CHANGE UP (ref 22–31)
CREAT SERPL-MCNC: 0.82 MG/DL — SIGNIFICANT CHANGE UP (ref 0.5–1.3)
EGFR: 93 ML/MIN/1.73M2 — SIGNIFICANT CHANGE UP
EOSINOPHIL # BLD AUTO: 0.01 K/UL — SIGNIFICANT CHANGE UP (ref 0–0.5)
EOSINOPHIL NFR BLD AUTO: 0.1 % — SIGNIFICANT CHANGE UP (ref 0–6)
ETHANOL SERPL-MCNC: <10 MG/DL — SIGNIFICANT CHANGE UP (ref 0–10)
FLUAV AG NPH QL: SIGNIFICANT CHANGE UP
FLUBV AG NPH QL: SIGNIFICANT CHANGE UP
GLUCOSE BLDC GLUCOMTR-MCNC: 16 MG/DL — CRITICAL LOW (ref 70–99)
GLUCOSE BLDC GLUCOMTR-MCNC: 180 MG/DL — HIGH (ref 70–99)
GLUCOSE BLDC GLUCOMTR-MCNC: 214 MG/DL — HIGH (ref 70–99)
GLUCOSE BLDC GLUCOMTR-MCNC: 262 MG/DL — HIGH (ref 70–99)
GLUCOSE BLDC GLUCOMTR-MCNC: 303 MG/DL — HIGH (ref 70–99)
GLUCOSE BLDC GLUCOMTR-MCNC: 325 MG/DL — HIGH (ref 70–99)
GLUCOSE BLDC GLUCOMTR-MCNC: 327 MG/DL — HIGH (ref 70–99)
GLUCOSE BLDC GLUCOMTR-MCNC: 44 MG/DL — CRITICAL LOW (ref 70–99)
GLUCOSE BLDC GLUCOMTR-MCNC: 48 MG/DL — CRITICAL LOW (ref 70–99)
GLUCOSE BLDC GLUCOMTR-MCNC: 90 MG/DL — SIGNIFICANT CHANGE UP (ref 70–99)
GLUCOSE SERPL-MCNC: 19 MG/DL — CRITICAL LOW (ref 70–99)
HCT VFR BLD CALC: 37.1 % — SIGNIFICANT CHANGE UP (ref 34.5–45)
HGB BLD-MCNC: 9.8 G/DL — LOW (ref 11.5–15.5)
HYPOCHROMIA BLD QL: SIGNIFICANT CHANGE UP
IMM GRANULOCYTES NFR BLD AUTO: 0.3 % — SIGNIFICANT CHANGE UP (ref 0–0.9)
INR BLD: 1.06 RATIO — SIGNIFICANT CHANGE UP (ref 0.88–1.16)
LACTATE SERPL-SCNC: 2.7 MMOL/L — HIGH (ref 0.7–2)
LG PLATELETS BLD QL AUTO: SLIGHT — SIGNIFICANT CHANGE UP
LYMPHOCYTES # BLD AUTO: 1.05 K/UL — SIGNIFICANT CHANGE UP (ref 1–3.3)
LYMPHOCYTES # BLD AUTO: 11.4 % — LOW (ref 13–44)
MANUAL SMEAR VERIFICATION: SIGNIFICANT CHANGE UP
MCHC RBC-ENTMCNC: 15.6 PG — LOW (ref 27–34)
MCHC RBC-ENTMCNC: 26.4 G/DL — LOW (ref 32–36)
MCV RBC AUTO: 59.2 FL — LOW (ref 80–100)
MICROCYTES BLD QL: SIGNIFICANT CHANGE UP
MONOCYTES # BLD AUTO: 0.47 K/UL — SIGNIFICANT CHANGE UP (ref 0–0.9)
MONOCYTES NFR BLD AUTO: 5.1 % — SIGNIFICANT CHANGE UP (ref 2–14)
NEUTROPHILS # BLD AUTO: 7.55 K/UL — HIGH (ref 1.8–7.4)
NEUTROPHILS NFR BLD AUTO: 82.3 % — HIGH (ref 43–77)
NRBC # BLD: 0 /100 WBCS — SIGNIFICANT CHANGE UP (ref 0–0)
PLAT MORPH BLD: ABNORMAL
PLATELET # BLD AUTO: 411 K/UL — HIGH (ref 150–400)
POLYCHROMASIA BLD QL SMEAR: SLIGHT — SIGNIFICANT CHANGE UP
POTASSIUM SERPL-MCNC: 3.2 MMOL/L — LOW (ref 3.5–5.3)
POTASSIUM SERPL-SCNC: 3.2 MMOL/L — LOW (ref 3.5–5.3)
PROT SERPL-MCNC: 9.5 GM/DL — HIGH (ref 6–8.3)
PROTHROM AB SERPL-ACNC: 12.6 SEC — SIGNIFICANT CHANGE UP (ref 10.5–13.4)
RBC # BLD: 6.27 M/UL — HIGH (ref 3.8–5.2)
RBC # FLD: 22.9 % — HIGH (ref 10.3–14.5)
RBC BLD AUTO: ABNORMAL
SARS-COV-2 RNA SPEC QL NAA+PROBE: SIGNIFICANT CHANGE UP
SODIUM SERPL-SCNC: 136 MMOL/L — SIGNIFICANT CHANGE UP (ref 135–145)
TARGETS BLD QL SMEAR: SLIGHT — SIGNIFICANT CHANGE UP
TROPONIN I, HIGH SENSITIVITY RESULT: <3 NG/L — SIGNIFICANT CHANGE UP
TSH SERPL-MCNC: 12.8 UIU/ML — HIGH (ref 0.36–3.74)
WBC # BLD: 9.18 K/UL — SIGNIFICANT CHANGE UP (ref 3.8–10.5)
WBC # FLD AUTO: 9.18 K/UL — SIGNIFICANT CHANGE UP (ref 3.8–10.5)

## 2023-02-24 PROCEDURE — 99291 CRITICAL CARE FIRST HOUR: CPT

## 2023-02-24 PROCEDURE — 70450 CT HEAD/BRAIN W/O DYE: CPT | Mod: 26,MA

## 2023-02-24 PROCEDURE — 71045 X-RAY EXAM CHEST 1 VIEW: CPT | Mod: 26

## 2023-02-24 RX ORDER — HALOPERIDOL DECANOATE 100 MG/ML
5 INJECTION INTRAMUSCULAR ONCE
Refills: 0 | Status: COMPLETED | OUTPATIENT
Start: 2023-02-24 | End: 2023-02-24

## 2023-02-24 RX ORDER — DEXTROSE 50 % IN WATER 50 %
50 SYRINGE (ML) INTRAVENOUS ONCE
Refills: 0 | Status: COMPLETED | OUTPATIENT
Start: 2023-02-24 | End: 2023-02-24

## 2023-02-24 RX ORDER — NALOXONE HYDROCHLORIDE 4 MG/.1ML
0.4 SPRAY NASAL ONCE
Refills: 0 | Status: COMPLETED | OUTPATIENT
Start: 2023-02-24 | End: 2023-02-24

## 2023-02-24 RX ORDER — SODIUM CHLORIDE 9 MG/ML
1000 INJECTION, SOLUTION INTRAVENOUS
Refills: 0 | Status: DISCONTINUED | OUTPATIENT
Start: 2023-02-24 | End: 2023-02-25

## 2023-02-24 RX ORDER — MIDAZOLAM HYDROCHLORIDE 1 MG/ML
4 INJECTION, SOLUTION INTRAMUSCULAR; INTRAVENOUS ONCE
Refills: 0 | Status: DISCONTINUED | OUTPATIENT
Start: 2023-02-24 | End: 2023-02-24

## 2023-02-24 RX ORDER — POTASSIUM CHLORIDE 20 MEQ
40 PACKET (EA) ORAL EVERY 4 HOURS
Refills: 0 | Status: DISCONTINUED | OUTPATIENT
Start: 2023-02-24 | End: 2023-02-24

## 2023-02-24 RX ORDER — ENOXAPARIN SODIUM 100 MG/ML
40 INJECTION SUBCUTANEOUS EVERY 24 HOURS
Refills: 0 | Status: DISCONTINUED | OUTPATIENT
Start: 2023-02-24 | End: 2023-02-28

## 2023-02-24 RX ORDER — SODIUM CHLORIDE 9 MG/ML
1000 INJECTION, SOLUTION INTRAVENOUS
Refills: 0 | Status: DISCONTINUED | OUTPATIENT
Start: 2023-02-24 | End: 2023-02-24

## 2023-02-24 RX ORDER — LEVOTHYROXINE SODIUM 125 MCG
100 TABLET ORAL DAILY
Refills: 0 | Status: DISCONTINUED | OUTPATIENT
Start: 2023-02-24 | End: 2023-02-28

## 2023-02-24 RX ORDER — CHLORHEXIDINE GLUCONATE 213 G/1000ML
1 SOLUTION TOPICAL
Refills: 0 | Status: DISCONTINUED | OUTPATIENT
Start: 2023-02-24 | End: 2023-02-24

## 2023-02-24 RX ORDER — CHLORHEXIDINE GLUCONATE 213 G/1000ML
1 SOLUTION TOPICAL
Refills: 0 | Status: DISCONTINUED | OUTPATIENT
Start: 2023-02-24 | End: 2023-02-28

## 2023-02-24 RX ORDER — SODIUM CHLORIDE 9 MG/ML
1000 INJECTION INTRAMUSCULAR; INTRAVENOUS; SUBCUTANEOUS ONCE
Refills: 0 | Status: COMPLETED | OUTPATIENT
Start: 2023-02-24 | End: 2023-02-24

## 2023-02-24 RX ADMIN — HALOPERIDOL DECANOATE 5 MILLIGRAM(S): 100 INJECTION INTRAMUSCULAR at 20:20

## 2023-02-24 RX ADMIN — Medication 50 MILLILITER(S): at 18:51

## 2023-02-24 RX ADMIN — SODIUM CHLORIDE 100 MILLILITER(S): 9 INJECTION, SOLUTION INTRAVENOUS at 20:15

## 2023-02-24 RX ADMIN — Medication 2 MILLIGRAM(S): at 19:25

## 2023-02-24 RX ADMIN — NALOXONE HYDROCHLORIDE 0.4 MILLIGRAM(S): 4 SPRAY NASAL at 17:10

## 2023-02-24 RX ADMIN — SODIUM CHLORIDE 100 MILLILITER(S): 9 INJECTION, SOLUTION INTRAVENOUS at 23:11

## 2023-02-24 RX ADMIN — HALOPERIDOL DECANOATE 5 MILLIGRAM(S): 100 INJECTION INTRAMUSCULAR at 19:56

## 2023-02-24 RX ADMIN — SODIUM CHLORIDE 1000 MILLILITER(S): 9 INJECTION INTRAMUSCULAR; INTRAVENOUS; SUBCUTANEOUS at 18:10

## 2023-02-24 RX ADMIN — MIDAZOLAM HYDROCHLORIDE 4 MILLIGRAM(S): 1 INJECTION, SOLUTION INTRAMUSCULAR; INTRAVENOUS at 20:20

## 2023-02-24 RX ADMIN — SODIUM CHLORIDE 1000 MILLILITER(S): 9 INJECTION INTRAMUSCULAR; INTRAVENOUS; SUBCUTANEOUS at 17:10

## 2023-02-24 RX ADMIN — Medication 50 MILLILITER(S): at 20:30

## 2023-02-24 RX ADMIN — Medication 50 MILLILITER(S): at 17:10

## 2023-02-24 RX ADMIN — Medication 2 MILLIGRAM(S): at 19:30

## 2023-02-24 NOTE — ED ADULT TRIAGE NOTE - CCCP TRG CHIEF CMPLNT
02/25/17 0850   Provider Notification   Provider Name/Title Dr Rivera   Method of Notification Phone   Request Evaluate - Remote   Notification Reason Patient Arrived;Membrane Status;Lab/Diagnostic Study;SVE;Status Update   Orders for Pitocin induction received  
   02/25/17 1315   Provider Notification   Provider Name/Title Dr Duval    Method of Notification At Bedside   Request Evaluate in Person   Notification Reason Pain   Dr Duval here and placed the epidural.   
   02/25/17 1526   Comments   Comments Bedside handoff recieved from Lorelei Kim RN     
   02/25/17 1644   Provider Notification   Provider Name/Title Dr. Rivera   Method of Notification Electronic Page   Request Evaluate - Remote   Notification Reason Status Update     Updated MD regarding SVE, FHR, contractions, and pitocin level. Patient comfortable with epidural at this time. Plan to re-check cervix in 30-60 minutes, labor down or begin pushing based on station at that time per MD.  
   02/25/17 1721   Provider Notification   Provider Name/Title Dr. Rivera   Method of Notification Electronic Page   Request Evaluate - Remote   Notification Reason Status Update     SVE 10/100/0.  Patient ok with laboring down for 1 hour at this time.  
   02/25/17 1752   Provider Notification   Provider Name/Title Dr. Rivera   Request Attend Delivery     
   02/25/17 1754   Provider Notification   Provider Name/Title MD    Method of Notification At Bedside   Request Attend Delivery     
Dr Rivera here to assess the patient in person. SVE 5/60/-2. AROM for clear fluid.   
unresponsive

## 2023-02-24 NOTE — ED ADULT NURSE REASSESSMENT NOTE - NS ED NURSE REASSESS COMMENT FT1
Pt agitated, combative, non compliant, pulling at lines, fighting with staff. Dr. Hannah informed, security at bedside. Safety and security measures in place. 1:1 constant observation initiated. Will continue to monitor.

## 2023-02-24 NOTE — ED PROVIDER NOTE - CARE PLAN
Principal Discharge DX:	Hypoglycemia   1 Principal Discharge DX:	Hypoglycemia  Secondary Diagnosis:	AMS (altered mental status)

## 2023-02-24 NOTE — ED ADULT NURSE NOTE - ED STAT RN HANDOFF DETAILS
Report given to receiving ICU RN Wilman, pt history, current condition and reason for admission discussed, safety concerns addressed and reviewed, pt sedated, currently in stable condition, vitals stable, maintaining on room air, ETCO2 38, RR 18, SPO2 98%, IV flushes for patency and site shows no signs or symptoms of infiltrate, dressing is clean dry and intact, pt family is aware of plan of care. 5% dextrose IV fluid infusing at 100 ml/hr. Pt family education deemed successful at time of report after patient family demonstrates successful teach back for proficiency.

## 2023-02-24 NOTE — ED PROVIDER NOTE - CLINICAL SUMMARY MEDICAL DECISION MAKING FREE TEXT BOX
Pt with difficult access and hypoglycemia, FS at bedside was down to 16, no IV could be placed by RN and US without success after 2 attempts - IO placed - with dextrose and narcan given and pt waking up with FS improving - otherwise will observe for hypoglycemia - Pt with difficult access and hypoglycemia, FS at bedside was down to 16, no IV could be placed by RN and US without success after 2 attempts - IO placed - with dextrose and narcan given and pt waking up with FS improving - otherwise will observe for hypoglycemia - noted recurrence despite having food - will have ICU evaluate pt.

## 2023-02-24 NOTE — H&P ADULT - CRITICAL CARE ATTENDING COMMENT
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 Pt recently here in september with similar presentation. Pt arrived unresponsive to ED FS 16, difficulty obtaining access therefore IO placed and received glucose as well as intranasal narcan. Pt then was arousable and became difficult requiring a code grey. Appears to have cognitive deficits from prior hypoglycemia however has much improved since the initial insult per mother. Currently lives with mother. At bedside pt stating she wants to go home.    Pt should not be allowed to sign out AMA as she does not fully comprehend why she needs to be here.    Initially hypoglycemic on arrival started on d5 drip will monitor closely and d/c when glucose normalizes. Will eventually need reinitiation of insulin.     Will require repeat TSH as outpatient.     Will need to ask about med compliance when pt is back to baseline and restart home meds when tolerating oral.     ETCO2 monitoring at this time, after narcan/glucose pt did require haldol for agitation. Will avoid additional narcan unless warranted if so start at very low dose.     FULL CODE  lovenox for dvt ppx

## 2023-02-24 NOTE — ED PROVIDER NOTE - PROGRESS NOTE DETAILS
pt. is agitated, aggressive with staff, attempting to pull IO access, screaming  will need sedation as she is a risk to herself and others in ER pt. sedated and L IJ vascular access obtained--line functional, pt. on monitor and Capnography  ICU eval appreciated, awaiting acceptance

## 2023-02-24 NOTE — H&P ADULT - NSHPPHYSICALEXAM_GEN_ALL_CORE
I saw her after dextrose with a FS of 90 after having received ativan and haldol    Drowsy but arousable DAWSON    Pupils 1mm bilaterally\  Lungs clear  s1 s2 reg  distended soft abd naval piercing  tattoos  warm ext no edema I saw her after dextrose with a FS of 90 after having received ativan and haldol    Drowsy but arousable DAWSON    Pupils 1mm bilaterally  neck supple  Lungs clear  s1 s2 reg  distended soft abd naval piercing  multiple tattoos  warm ext, DAWSON, no edema

## 2023-02-24 NOTE — ED ADULT NURSE NOTE - NSIMPLEMENTINTERV_GEN_ALL_ED
Implemented All Fall with Harm Risk Interventions:  Aubrey to call system. Call bell, personal items and telephone within reach. Instruct patient to call for assistance. Room bathroom lighting operational. Non-slip footwear when patient is off stretcher. Physically safe environment: no spills, clutter or unnecessary equipment. Stretcher in lowest position, wheels locked, appropriate side rails in place. Provide visual cue, wrist band, yellow gown, etc. Monitor gait and stability. Monitor for mental status changes and reorient to person, place, and time. Review medications for side effects contributing to fall risk. Reinforce activity limits and safety measures with patient and family. Provide visual clues: red socks.

## 2023-02-24 NOTE — H&P ADULT - ASSESSMENT
42F hx hypothyroidism DM (2)  on insulin  Drug use disorder HIV previously on HAART ?? if still taking.   Hx of prolonged hypoglycemic coma.  Admissions here in the past  for both  DKA and hypoglycemia.    Admit today with obtundation due to severe and recurrent hypoglycemia ?? opiate  reversal effect     Her TSH is 12 with a hx of hypothyroidism.  Here compliance with meds is in question.   For now rx for hypoglycemia  replace K+  DVT P  hgb A1C,    Need to reach the family, verify meds etc.     Await U tox to see if any opiates to explain her small pupils.      D/C leg IO when IV access established.        Addendum.  Was able to reach the patients mother and there is a good possibility that the patient made a mistake with her insulin   Her HIV is managed at [facility] with Biktavry   VL is detectable.      Her DM is managed with Basaglar and Fiast      42F hx hypothyroidism DM (2)  on insulin  Drug use disorder HIV previously on HAART ?? if still taking.   Hx of prolonged hypoglycemic coma.  Admissions here in the past  for both  DKA and hypoglycemia.    Admit today with obtundation due to severe and recurrent hypoglycemia ?? opiate  reversal effect     Her TSH is 12 with a hx of hypothyroidism.  Here compliance with meds is in question.   For now rx for hypoglycemia  replace K+  DVT P  hgb A1C,    Need to reach the family, verify meds etc.     Await U tox to see if any opiates to explain her small pupils.      D/C leg IO when IV access established.        Addendum.  Was able to reach the patients mother and there is a good possibility that the patient made a mistake with her insulin   Her HIV is managed at [facility] with Biktavry   VL is detectable.      Her DM is managed with Basaglar and Fiast     Will defer VL testing to her provider at [facility].  She takes Biktarvy -25     42F hx hypothyroidism DM (2)  on insulin  Drug use disorder HIV previously on HAART ?? if still taking.   Hx of prolonged hypoglycemic coma.  Admissions here in the past  for both  DKA and hypoglycemia.    Admit today with obtundation due to severe and recurrent hypoglycemia ?? opiate  reversal effect     Her TSH is 12 with a hx of hypothyroidism.  Here compliance with meds is in question.   For now rx for hypoglycemia  replace K+  DVT P  hgb A1C,    Need to reach the family, verify meds etc.     Await U tox to see if any opiates to explain her small pupils.      D/C leg IO when IV access established.        Addendum.  Was able to reach the patients mother and there is a good possibility that the patient made a mistake with her insulin   Her HIV is managed at [facility] with Biktavry   VL is detectable.      Her DM is managed with Basaglar and Fiasp     Will defer VL testing to her provider at [facility].  She takes Biktarvy -25

## 2023-02-24 NOTE — ED PROVIDER NOTE - OBJECTIVE STATEMENT
42 year old female with PMH of DM II on insulin, otherwise previous drug abuse otherwise presenting to ED due to unresponsive episode noted at home - sister said pt was in between sensors for sugar and otherwise noted sugar of 30s, was attempting to take oral dextrose but without success as pt not tolerating PO intake and otherwise was trialed on oral glucose by EMS without success, FS was 60s at home and was brought into ED without any access. 42 year old female with PMH of DM II on insulin, Hypothyroid, otherwise previous drug abuse otherwise presenting to ED due to unresponsive episode noted at home - sister said pt was in between sensors for sugar and otherwise noted sugar of 30s, was attempting to take oral dextrose but without success as pt not tolerating PO intake and otherwise was trialed on oral glucose by EMS without success, FS was 60s at home and was brought into ED without any access. 42 year old female with PMH of DM II on insulin, Hypothyroid, HIV, previous coma in 2019, otherwise previous drug abuse presenting to ED due to unresponsive episode noted at home - sister said pt was in between sensors for sugar and otherwise noted sugar of 30s, was attempting to take oral dextrose but without success as pt not tolerating PO intake and otherwise was trialed on oral glucose by EMS without success, FS was 60s at home and was brought into ED without any access.

## 2023-02-24 NOTE — ED ADULT NURSE NOTE - OBJECTIVE STATEMENT
Pt aaox1, bibems from home for unresponsiveness, FS 16 in triage. Pt was found on the bedroom floor by her sister who called 911. EMS unable to place IV in field so pt arrived to ed without peripheral IV access. Left tibia IO placed upon arrival to ED by Dr. Olivo. 2 amps D50 and Narcan administered emergently, FS improved to 180 shortly after dextrose. Pt became aggressive and combative once awake, multiple attempts made by 2 different RNs to place IVL, and JORGE Wild also tried to place US guided IVL x3 without success. Labs obtained via #22G butterfly needle via right AC without incident. ?drug use, pt pending Utox. 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 Cardiac and spo2 monitoring in place. 12 lead ekg completed. Pt's sister at bedside.

## 2023-02-24 NOTE — H&P ADULT - HISTORY OF PRESENT ILLNESS
Hx from chart, patient unable to provide meaningful hx due to AMS       42F hx hypothyroidism DM (2)  on insulin  Drug use disorder HIV previously on HAART ?? if still taking.   hx of prolonged hypoglycemic coma.  Admissions here in the past  for both  DKA and hypoglycemia presents with hypoglycemia at home. With FS in 60's.    Arrived here obtunded with FS glucose of  19     She received an amp of D50 and narcan waking up agitated then given ativan  4mg and haldol 5mg.    Her FS glucose went from 303 back down to 44.      Additional dextrose given and now on an infusion of D5NS    IV access was difficult, L leg IO was placed for dextrose.                Hx from chart, patient unable to provide meaningful hx due to AMS       42F hx hypothyroidism DM (2)  on insulin  Drug use disorder HIV previously on HAART ?? if still taking.   hx of prolonged hypoglycemic coma .  Admissions here in the past  for both  DKA and hypoglycemia presents with hypoglycemia at home. With FS in 60's.    Arrived here obtunded with FS glucose of  19     She received an amp of D50 and narcan waking up agitated then given ativan  4mg and haldol 5mg.    Her FS glucose went from 303 back down to 44.      Additional dextrose given and now on an infusion of D5NS    IV access was difficult, L leg IO was placed for dextrose.

## 2023-02-25 DIAGNOSIS — E03.9 HYPOTHYROIDISM, UNSPECIFIED: ICD-10-CM

## 2023-02-25 DIAGNOSIS — E16.2 HYPOGLYCEMIA, UNSPECIFIED: ICD-10-CM

## 2023-02-25 LAB
AMPHET UR-MCNC: NEGATIVE — SIGNIFICANT CHANGE UP
APPEARANCE UR: CLEAR — SIGNIFICANT CHANGE UP
BARBITURATES UR SCN-MCNC: NEGATIVE — SIGNIFICANT CHANGE UP
BENZODIAZ UR-MCNC: POSITIVE — SIGNIFICANT CHANGE UP
BILIRUB UR-MCNC: NEGATIVE — SIGNIFICANT CHANGE UP
COCAINE METAB.OTHER UR-MCNC: NEGATIVE — SIGNIFICANT CHANGE UP
COLOR SPEC: YELLOW — SIGNIFICANT CHANGE UP
DIFF PNL FLD: ABNORMAL
EPI CELLS # UR: SIGNIFICANT CHANGE UP
GLUCOSE BLDC GLUCOMTR-MCNC: 222 MG/DL — HIGH (ref 70–99)
GLUCOSE BLDC GLUCOMTR-MCNC: 227 MG/DL — HIGH (ref 70–99)
GLUCOSE BLDC GLUCOMTR-MCNC: 295 MG/DL — HIGH (ref 70–99)
GLUCOSE BLDC GLUCOMTR-MCNC: 302 MG/DL — HIGH (ref 70–99)
GLUCOSE UR QL: 1000 MG/DL
KETONES UR-MCNC: ABNORMAL
LEUKOCYTE ESTERASE UR-ACNC: NEGATIVE — SIGNIFICANT CHANGE UP
METHADONE UR-MCNC: NEGATIVE — SIGNIFICANT CHANGE UP
NITRITE UR-MCNC: NEGATIVE — SIGNIFICANT CHANGE UP
OPIATES UR-MCNC: NEGATIVE — SIGNIFICANT CHANGE UP
PCP SPEC-MCNC: SIGNIFICANT CHANGE UP
PCP UR-MCNC: NEGATIVE — SIGNIFICANT CHANGE UP
PH UR: 5 — SIGNIFICANT CHANGE UP (ref 5–8)
PROT UR-MCNC: 15 MG/DL
RBC CASTS # UR COMP ASSIST: SIGNIFICANT CHANGE UP /HPF (ref 0–4)
SP GR SPEC: 1.01 — SIGNIFICANT CHANGE UP (ref 1.01–1.02)
THC UR QL: POSITIVE — SIGNIFICANT CHANGE UP
UROBILINOGEN FLD QL: NEGATIVE MG/DL — SIGNIFICANT CHANGE UP
WBC UR QL: SIGNIFICANT CHANGE UP

## 2023-02-25 PROCEDURE — 99232 SBSQ HOSP IP/OBS MODERATE 35: CPT

## 2023-02-25 RX ORDER — POTASSIUM CHLORIDE 20 MEQ
40 PACKET (EA) ORAL ONCE
Refills: 0 | Status: COMPLETED | OUTPATIENT
Start: 2023-02-25 | End: 2023-02-25

## 2023-02-25 RX ORDER — SODIUM CHLORIDE 9 MG/ML
1000 INJECTION, SOLUTION INTRAVENOUS
Refills: 0 | Status: DISCONTINUED | OUTPATIENT
Start: 2023-02-25 | End: 2023-02-28

## 2023-02-25 RX ORDER — GLUCAGON INJECTION, SOLUTION 0.5 MG/.1ML
1 INJECTION, SOLUTION SUBCUTANEOUS ONCE
Refills: 0 | Status: DISCONTINUED | OUTPATIENT
Start: 2023-02-25 | End: 2023-02-28

## 2023-02-25 RX ORDER — POTASSIUM CHLORIDE 20 MEQ
10 PACKET (EA) ORAL
Refills: 0 | Status: COMPLETED | OUTPATIENT
Start: 2023-02-25 | End: 2023-02-25

## 2023-02-25 RX ORDER — INSULIN LISPRO 100/ML
VIAL (ML) SUBCUTANEOUS
Refills: 0 | Status: DISCONTINUED | OUTPATIENT
Start: 2023-02-25 | End: 2023-02-28

## 2023-02-25 RX ORDER — BICTEGRAVIR SODIUM, EMTRICITABINE, AND TENOFOVIR ALAFENAMIDE FUMARATE 30; 120; 15 MG/1; MG/1; MG/1
1 TABLET ORAL DAILY
Refills: 0 | Status: DISCONTINUED | OUTPATIENT
Start: 2023-02-25 | End: 2023-02-28

## 2023-02-25 RX ORDER — DEXTROSE 50 % IN WATER 50 %
12.5 SYRINGE (ML) INTRAVENOUS ONCE
Refills: 0 | Status: DISCONTINUED | OUTPATIENT
Start: 2023-02-25 | End: 2023-02-28

## 2023-02-25 RX ORDER — DEXTROSE 50 % IN WATER 50 %
15 SYRINGE (ML) INTRAVENOUS ONCE
Refills: 0 | Status: DISCONTINUED | OUTPATIENT
Start: 2023-02-25 | End: 2023-02-28

## 2023-02-25 RX ORDER — DEXTROSE 50 % IN WATER 50 %
25 SYRINGE (ML) INTRAVENOUS ONCE
Refills: 0 | Status: DISCONTINUED | OUTPATIENT
Start: 2023-02-25 | End: 2023-02-28

## 2023-02-25 RX ADMIN — Medication 40 MILLIEQUIVALENT(S): at 04:35

## 2023-02-25 RX ADMIN — Medication 1: at 21:43

## 2023-02-25 RX ADMIN — Medication 2: at 11:49

## 2023-02-25 RX ADMIN — ENOXAPARIN SODIUM 40 MILLIGRAM(S): 100 INJECTION SUBCUTANEOUS at 22:10

## 2023-02-25 RX ADMIN — Medication 4: at 16:17

## 2023-02-25 RX ADMIN — Medication 40 MILLIEQUIVALENT(S): at 09:34

## 2023-02-25 NOTE — CONSULT NOTE ADULT - PROBLEM SELECTOR RECOMMENDATION 9
no HbA1C available , Follow up to see any overcontrol   patient may have taken too much short acting insulin   also check C-Peptide to classify type of diabetes   if sufficient , may change to Oral hypoglycemic agent or oral insulin secretogogue. otherwise continue with same regimen insulin  Continue with the current  regimen while inpatient and low dose Lantus added in AM   Lantus has no hypoglycemic properties

## 2023-02-25 NOTE — PROGRESS NOTE ADULT - SUBJECTIVE AND OBJECTIVE BOX
HPI:  Hx from chart, patient unable to provide meaningful hx due to AMS       42F hx hypothyroidism DM (2)  on insulin  Drug use disorder HIV previously on HAART ?? if still taking.   hx of prolonged hypoglycemic coma .  Admissions here in the past  for both  DKA and hypoglycemia presents with hypoglycemia at home. With FS in 60's.    Arrived here obtunded with FS glucose of  19     She received an amp of D50 and narcan waking up agitated then given ativan  4mg and haldol 5mg.    Her FS glucose went from 303 back down to 44.      Additional dextrose given and now on an infusion of D5NS    IV access was difficult, L leg IO was placed for dextrose.                (2023 20:19)      24 hr events: Admitted to ICU for DKA, placed on insulin gtt. Feels ok. No chest pain, dyspnea, fevers, chills, nausea, emesis, or diarrhea. Wants to go home.    ## ROS:  See above. ROS otherwise negative.     ## Vitals  ICU Vital Signs Last 24 Hrs  T(C): 37.5 (2023 07:15), Max: 37.5 (2023 07:15)  T(F): 99.5 (2023 07:15), Max: 99.5 (2023 07:15)  HR: 95 (2023 08:00) (68 - 151)  BP: 98/68 (2023 08:00) (90/60 - 119/53)  BP(mean): 75 (2023 08:00) (66 - 75)  ABP: --  ABP(mean): --  RR: 17 (2023 08:00) (13 - 35)  SpO2: 99% (2023 08:00) (69% - 100%)    O2 Parameters below as of 2023 21:40  Patient On (Oxygen Delivery Method): room air            ## Physical Exam:  Gen:  HEENT:  Resp:  CV:  Abd:  Ext:  Neuro:    ## Vent Data      ## Labs:  Chem:      136  |  103  |  10  ----------------------------<  19<LL>  3.2<L>   |  24  |  0.82    Ca    9.4      2023 18:38    TPro  9.5<H>  /  Alb  4.3  /  TBili  0.5  /  DBili  x   /  AST  24  /  ALT  24  /  AlkPhos  93  02-24    LIVER FUNCTIONS - ( 2023 18:38 )  Alb: 4.3 g/dL / Pro: 9.5 gm/dL / ALK PHOS: 93 U/L / ALT: 24 U/L / AST: 24 U/L / GGT: x           CBC:                        9.8    9.18  )-----------( 411      ( 2023 18:38 )             37.1     Coags:  PT/INR - ( 2023 18:38 )   PT: 12.6 sec;   INR: 1.06 ratio         PTT - ( 2023 18:38 )  PTT:31.9 sec    Urinalysis Basic - ( 2023 01:00 )    Color: Yellow / Appearance: Clear / S.015 / pH: x  Gluc: x / Ketone: Trace  / Bili: Negative / Urobili: Negative mg/dL   Blood: x / Protein: 15 mg/dL / Nitrite: Negative   Leuk Esterase: Negative / RBC: 0-2 /HPF / WBC 3-5   Sq Epi: x / Non Sq Epi: Few / Bacteria: x        ## Cardiac        ## Blood Gas      #I/Os  I&O's Detail    2023 07:01  -  2023 07:00  --------------------------------------------------------  IN:    Lactated Ringers: 700 mL  Total IN: 700 mL    OUT:    Voided (mL): 900 mL  Total OUT: 900 mL    Total NET: -200 mL          ## Imaging:    ## Medications:  MEDICATIONS  (STANDING):  bictegravir 50 mG/emtricitabine 200 mG/tenofovir alafenamide 25 mG (BIKTARVY) 1 Tablet(s) Oral daily  chlorhexidine 2% Cloths 1 Application(s) Topical <User Schedule>  enoxaparin Injectable 40 milliGRAM(s) SubCutaneous every 24 hours  lactated ringers. 1000 milliLiter(s) (100 mL/Hr) IV Continuous <Continuous>  levothyroxine 100 MICROGram(s) Oral daily  potassium chloride   Powder 40 milliEquivalent(s) Oral once    MEDICATIONS  (PRN):       HPI:  Hx from chart, patient unable to provide meaningful hx due to AMS       42F hx hypothyroidism DM (2)  on insulin  Drug use disorder HIV previously on HAART ?? if still taking.   hx of prolonged hypoglycemic coma .  Admissions here in the past  for both  DKA and hypoglycemia presents with hypoglycemia at home. With FS in 60's.    Arrived here obtunded with FS glucose of  19     She received an amp of D50 and narcan waking up agitated then given ativan  4mg and haldol 5mg.    Her FS glucose went from 303 back down to 44.      Additional dextrose given and now on an infusion of D5NS    IV access was difficult, L leg IO was placed for dextrose.                (2023 20:19)      24 hr events: Admitted to ICU for DKA, placed on insulin gtt. Feels ok. No chest pain, dyspnea, fevers, chills, nausea, emesis, or diarrhea. Wants to go home.    ## ROS:  See above. ROS otherwise negative.     ## Vitals  ICU Vital Signs Last 24 Hrs  T(C): 37.5 (2023 07:15), Max: 37.5 (2023 07:15)  T(F): 99.5 (2023 07:15), Max: 99.5 (2023 07:15)  HR: 95 (2023 08:00) (68 - 151)  BP: 98/68 (2023 08:00) (90/60 - 119/53)  BP(mean): 75 (2023 08:00) (66 - 75)  ABP: --  ABP(mean): --  RR: 17 (2023 08:00) (13 - 35)  SpO2: 99% (2023 08:00) (69% - 100%)    O2 Parameters below as of 2023 21:40  Patient On (Oxygen Delivery Method): room air            ## Physical Exam:  Gen: Adult female lying in bed, NAD  HEENT: NC/AT sclerae anicteric  Resp: No increased WOB, CTAB  CV: S1, S2  Abd: Soft, + BS  Ext: WWP  Neuro: Sleeping, arousable, not cooperative with exam    ## Vent Data      ## Labs:  Chem:      136  |  103  |  10  ----------------------------<  19<LL>  3.2<L>   |  24  |  0.82    Ca    9.4      2023 18:38    TPro  9.5<H>  /  Alb  4.3  /  TBili  0.5  /  DBili  x   /  AST  24  /  ALT  24  /  AlkPhos  93  02-24    LIVER FUNCTIONS - ( 2023 18:38 )  Alb: 4.3 g/dL / Pro: 9.5 gm/dL / ALK PHOS: 93 U/L / ALT: 24 U/L / AST: 24 U/L / GGT: x           CBC:                        9.8    9.18  )-----------( 411      ( 2023 18:38 )             37.1     Coags:  PT/INR - ( 2023 18:38 )   PT: 12.6 sec;   INR: 1.06 ratio         PTT - ( 2023 18:38 )  PTT:31.9 sec    Urinalysis Basic - ( 2023 01:00 )    Color: Yellow / Appearance: Clear / S.015 / pH: x  Gluc: x / Ketone: Trace  / Bili: Negative / Urobili: Negative mg/dL   Blood: x / Protein: 15 mg/dL / Nitrite: Negative   Leuk Esterase: Negative / RBC: 0-2 /HPF / WBC 3-5   Sq Epi: x / Non Sq Epi: Few / Bacteria: x        ## Cardiac        ## Blood Gas      #I/Os  I&O's Detail    2023 07:01  -  2023 07:00  --------------------------------------------------------  IN:    Lactated Ringers: 700 mL  Total IN: 700 mL    OUT:    Voided (mL): 900 mL  Total OUT: 900 mL    Total NET: -200 mL          ## Imaging:    ## Medications:  MEDICATIONS  (STANDING):  bictegravir 50 mG/emtricitabine 200 mG/tenofovir alafenamide 25 mG (BIKTARVY) 1 Tablet(s) Oral daily  chlorhexidine 2% Cloths 1 Application(s) Topical <User Schedule>  enoxaparin Injectable 40 milliGRAM(s) SubCutaneous every 24 hours  lactated ringers. 1000 milliLiter(s) (100 mL/Hr) IV Continuous <Continuous>  levothyroxine 100 MICROGram(s) Oral daily  potassium chloride   Powder 40 milliEquivalent(s) Oral once    MEDICATIONS  (PRN):

## 2023-02-25 NOTE — PROGRESS NOTE ADULT - ASSESSMENT
41 y/o F w/HIV on ART and T2DM admitted for hypoglycemia, now resolved.     - Continue ART  - Restart home insulin and monitor  - Downgrade to medicine 43 y/o F w/HIV on ART and T2DM admitted for hypoglycemia, now resolved.     - Continue ART  - Restart insulin, endo consult  - Downgrade to medicine

## 2023-02-25 NOTE — CHART NOTE - NSCHARTNOTEFT_GEN_A_CORE
MICU DOWN GRADE NOTE      Patient is a 42y old  Female who presents with a chief complaint of Hypoglycemia possible narcotic OD (2023 08:57)      HPI:  42F hx hypothyroidism DM (2)  on insulin  Drug use disorder HIV previously on HAART ?? if still taking.   hx of prolonged hypoglycemic coma .  Admissions here in the past  for both  DKA and hypoglycemia presents with hypoglycemia at home. With FS in 60's. Initally arrived to ED obtunded with FS glucose of  19 ,She received an amp of D50 and narcan waking up agitated then given ativan  4mg and haldol 5mg. Her FS glucose went from 303 back down to 44.  Additional dextrose given and now on an infusion of D5NS  IV access was difficult, L leg IO was placed for dextrose. Admitted to ICU for BGL monitoring.                (2023 20:19)      24 hr events: Admitted to ICU for BGL/ neuro monitoring, mental status improving now awake and alert. Feels ok. No chest pain, dyspnea, fevers, chills, nausea, emesis, or diarrhea. Wants to go home.    ## ROS:  See above. ROS otherwise negative.     ## Vitals  ICU Vital Signs Last 24 Hrs  T(C): 37.5 (2023 07:15), Max: 37.5 (2023 07:15)  T(F): 99.5 (2023 07:15), Max: 99.5 (2023 07:15)  HR: 95 (2023 08:00) (68 - 151)  BP: 98/68 (2023 08:00) (90/60 - 119/53)  BP(mean): 75 (2023 08:00) (66 - 75)  ABP: --  ABP(mean): --  RR: 17 (2023 08:00) (13 - 35)  SpO2: 99% (2023 08:00) (69% - 100%)    O2 Parameters below as of 2023 21:40  Patient On (Oxygen Delivery Method): room air            ## Physical Exam:  Gen: Adult female lying in bed, NAD  HEENT: NC/AT sclerae anicteric  Resp: No increased WOB, CTAB  CV: S1, S2  Abd: Soft, + BS  Ext: WWP  Neuro: Sleeping, arousable, not cooperative with exam    ## Vent Data      ## Labs:  Chem:      136  |  103  |  10  ----------------------------<  19<LL>  3.2<L>   |  24  |  0.82    Ca    9.4      2023 18:38    TPro  9.5<H>  /  Alb  4.3  /  TBili  0.5  /  DBili  x   /  AST  24  /  ALT  24  /  AlkPhos  93  02-24    LIVER FUNCTIONS - ( 2023 18:38 )  Alb: 4.3 g/dL / Pro: 9.5 gm/dL / ALK PHOS: 93 U/L / ALT: 24 U/L / AST: 24 U/L / GGT: x           CBC:                        9.8    9.18  )-----------( 411      ( 2023 18:38 )             37.1     Coags:  PT/INR - ( 2023 18:38 )   PT: 12.6 sec;   INR: 1.06 ratio         PTT - ( 2023 18:38 )  PTT:31.9 sec    Urinalysis Basic - ( 2023 01:00 )    Color: Yellow / Appearance: Clear / S.015 / pH: x  Gluc: x / Ketone: Trace  / Bili: Negative / Urobili: Negative mg/dL   Blood: x / Protein: 15 mg/dL / Nitrite: Negative   Leuk Esterase: Negative / RBC: 0-2 /HPF / WBC 3-5   Sq Epi: x / Non Sq Epi: Few / Bacteria: x        ## Cardiac        ## Blood Gas      #I/Os  I&O's Detail    2023 07:01  -  2023 07:00  --------------------------------------------------------  IN:    Lactated Ringers: 700 mL  Total IN: 700 mL    OUT:    Voided (mL): 900 mL  Total OUT: 900 mL    Total NET: -200 mL          ## Imaging:    ## Medications:  MEDICATIONS  (STANDING):  bictegravir 50 mG/emtricitabine 200 mG/tenofovir alafenamide 25 mG (BIKTARVY) 1 Tablet(s) Oral daily  chlorhexidine 2% Cloths 1 Application(s) Topical <User Schedule>  enoxaparin Injectable 40 milliGRAM(s) SubCutaneous every 24 hours  lactated ringers. 1000 milliLiter(s) (100 mL/Hr) IV Continuous <Continuous>  levothyroxine 100 MICROGram(s) Oral daily  potassium chloride   Powder 40 milliEquivalent(s) Oral once    MEDICATIONS  (PRN):          Assessment and Plan:   · Assessment	  43 y/o F w/HIV on ART and T2DM admitted for hypoglycemia, now resolved.     -PT awake and alert, mental status improved  - Continue HAART  - Tolerating PO diet  - Restart SSI insulin, endo consulted  - Continue Levothyroxine  - PT no longer requiring ICU level of care and is cleared for transfer to medicine services. Verbal sign out discussed with MD--- MICU DOWN GRADE NOTE      Patient is a 42y old  Female who presents with a chief complaint of Hypoglycemia possible narcotic OD (2023 08:57)      HPI:  42F hx hypothyroidism DM (2)  on insulin  Drug use disorder HIV previously on HAART ?? if still taking.   hx of prolonged hypoglycemic coma .  Admissions here in the past  for both  DKA and hypoglycemia presents with hypoglycemia at home. With FS in 60's. Initally arrived to ED obtunded with FS glucose of  19 ,She received an amp of D50 and narcan waking up agitated then given ativan  4mg and haldol 5mg. Her FS glucose went from 303 back down to 44.  Additional dextrose given and now on an infusion of D5NS  IV access was difficult, L leg IO was placed for dextrose. Admitted to ICU for BGL monitoring.                (2023 20:19)      24 hr events: Admitted to ICU for BGL/ neuro monitoring, mental status improving now awake and alert. Feels ok. No chest pain, dyspnea, fevers, chills, nausea, emesis, or diarrhea. Wants to go home.    ## ROS:  See above. ROS otherwise negative.     ## Vitals  ICU Vital Signs Last 24 Hrs  T(C): 37.5 (2023 07:15), Max: 37.5 (2023 07:15)  T(F): 99.5 (2023 07:15), Max: 99.5 (2023 07:15)  HR: 95 (2023 08:00) (68 - 151)  BP: 98/68 (2023 08:00) (90/60 - 119/53)  BP(mean): 75 (2023 08:00) (66 - 75)  ABP: --  ABP(mean): --  RR: 17 (2023 08:00) (13 - 35)  SpO2: 99% (2023 08:00) (69% - 100%)    O2 Parameters below as of 2023 21:40  Patient On (Oxygen Delivery Method): room air            ## Physical Exam:  Gen: Adult female lying in bed, NAD  HEENT: NC/AT sclerae anicteric  Resp: No increased WOB, CTAB  CV: S1, S2  Abd: Soft, + BS  Ext: WWP  Neuro: Sleeping, arousable, not cooperative with exam    ## Vent Data      ## Labs:  Chem:      136  |  103  |  10  ----------------------------<  19<LL>  3.2<L>   |  24  |  0.82    Ca    9.4      2023 18:38    TPro  9.5<H>  /  Alb  4.3  /  TBili  0.5  /  DBili  x   /  AST  24  /  ALT  24  /  AlkPhos  93  02-24    LIVER FUNCTIONS - ( 2023 18:38 )  Alb: 4.3 g/dL / Pro: 9.5 gm/dL / ALK PHOS: 93 U/L / ALT: 24 U/L / AST: 24 U/L / GGT: x           CBC:                        9.8    9.18  )-----------( 411      ( 2023 18:38 )             37.1     Coags:  PT/INR - ( 2023 18:38 )   PT: 12.6 sec;   INR: 1.06 ratio         PTT - ( 2023 18:38 )  PTT:31.9 sec    Urinalysis Basic - ( 2023 01:00 )    Color: Yellow / Appearance: Clear / S.015 / pH: x  Gluc: x / Ketone: Trace  / Bili: Negative / Urobili: Negative mg/dL   Blood: x / Protein: 15 mg/dL / Nitrite: Negative   Leuk Esterase: Negative / RBC: 0-2 /HPF / WBC 3-5   Sq Epi: x / Non Sq Epi: Few / Bacteria: x        ## Cardiac        ## Blood Gas      #I/Os  I&O's Detail    2023 07:01  -  2023 07:00  --------------------------------------------------------  IN:    Lactated Ringers: 700 mL  Total IN: 700 mL    OUT:    Voided (mL): 900 mL  Total OUT: 900 mL    Total NET: -200 mL          ## Imaging:    ## Medications:  MEDICATIONS  (STANDING):  bictegravir 50 mG/emtricitabine 200 mG/tenofovir alafenamide 25 mG (BIKTARVY) 1 Tablet(s) Oral daily  chlorhexidine 2% Cloths 1 Application(s) Topical <User Schedule>  enoxaparin Injectable 40 milliGRAM(s) SubCutaneous every 24 hours  lactated ringers. 1000 milliLiter(s) (100 mL/Hr) IV Continuous <Continuous>  levothyroxine 100 MICROGram(s) Oral daily  potassium chloride   Powder 40 milliEquivalent(s) Oral once    MEDICATIONS  (PRN):          Assessment and Plan:   · Assessment	  41 y/o F w/HIV on ART and T2DM admitted for hypoglycemia, now resolved.     -PT awake and alert, mental status improved  - Continue HAART  - Tolerating PO diet  - Restart SSI insulin, endo consulted  - Continue Levothyroxine  - PT no longer requiring ICU level of care and is cleared for transfer to medicine services. Verbal sign out discussed with MD Enrique.

## 2023-02-25 NOTE — CONSULT NOTE ADULT - PROBLEM SELECTOR RECOMMENDATION 2
TSH increased but patient continued on home regimen   non-compliance may be present  Check thyroid function tests in a few days , The recovery of TSH may lag behind time wise compared to  thyroid hormones like T4 and T3   Thank You for the courtesy of this consultation !!!

## 2023-02-26 DIAGNOSIS — B20 HUMAN IMMUNODEFICIENCY VIRUS [HIV] DISEASE: ICD-10-CM

## 2023-02-26 LAB
CULTURE RESULTS: SIGNIFICANT CHANGE UP
GLUCOSE BLDC GLUCOMTR-MCNC: 323 MG/DL — HIGH (ref 70–99)
GLUCOSE BLDC GLUCOMTR-MCNC: 366 MG/DL — HIGH (ref 70–99)
GLUCOSE BLDC GLUCOMTR-MCNC: 373 MG/DL — HIGH (ref 70–99)
GLUCOSE BLDC GLUCOMTR-MCNC: 380 MG/DL — HIGH (ref 70–99)
GLUCOSE BLDC GLUCOMTR-MCNC: 401 MG/DL — HIGH (ref 70–99)
SPECIMEN SOURCE: SIGNIFICANT CHANGE UP

## 2023-02-26 PROCEDURE — 99233 SBSQ HOSP IP/OBS HIGH 50: CPT

## 2023-02-26 RX ORDER — OLANZAPINE 15 MG/1
5 TABLET, FILM COATED ORAL EVERY 8 HOURS
Refills: 0 | Status: DISCONTINUED | OUTPATIENT
Start: 2023-02-26 | End: 2023-02-28

## 2023-02-26 RX ORDER — INSULIN GLARGINE 100 [IU]/ML
10 INJECTION, SOLUTION SUBCUTANEOUS EVERY MORNING
Refills: 0 | Status: DISCONTINUED | OUTPATIENT
Start: 2023-02-27 | End: 2023-02-28

## 2023-02-26 RX ORDER — INSULIN LISPRO 100/ML
4 VIAL (ML) SUBCUTANEOUS
Refills: 0 | Status: DISCONTINUED | OUTPATIENT
Start: 2023-02-26 | End: 2023-02-28

## 2023-02-26 RX ORDER — DEXTROSE 50 % IN WATER 50 %
25 SYRINGE (ML) INTRAVENOUS ONCE
Refills: 0 | Status: DISCONTINUED | OUTPATIENT
Start: 2023-02-26 | End: 2023-02-28

## 2023-02-26 RX ORDER — INSULIN GLARGINE 100 [IU]/ML
10 INJECTION, SOLUTION SUBCUTANEOUS ONCE
Refills: 0 | Status: COMPLETED | OUTPATIENT
Start: 2023-02-26 | End: 2023-02-26

## 2023-02-26 RX ORDER — GLUCAGON INJECTION, SOLUTION 0.5 MG/.1ML
1 INJECTION, SOLUTION SUBCUTANEOUS ONCE
Refills: 0 | Status: DISCONTINUED | OUTPATIENT
Start: 2023-02-26 | End: 2023-02-28

## 2023-02-26 RX ORDER — DEXTROSE 50 % IN WATER 50 %
12.5 SYRINGE (ML) INTRAVENOUS ONCE
Refills: 0 | Status: DISCONTINUED | OUTPATIENT
Start: 2023-02-26 | End: 2023-02-28

## 2023-02-26 RX ADMIN — Medication 4: at 11:55

## 2023-02-26 RX ADMIN — BICTEGRAVIR SODIUM, EMTRICITABINE, AND TENOFOVIR ALAFENAMIDE FUMARATE 1 TABLET(S): 30; 120; 15 TABLET ORAL at 08:02

## 2023-02-26 RX ADMIN — Medication 5: at 21:34

## 2023-02-26 RX ADMIN — ENOXAPARIN SODIUM 40 MILLIGRAM(S): 100 INJECTION SUBCUTANEOUS at 21:33

## 2023-02-26 RX ADMIN — Medication 5: at 07:58

## 2023-02-26 RX ADMIN — INSULIN GLARGINE 10 UNIT(S): 100 INJECTION, SOLUTION SUBCUTANEOUS at 11:55

## 2023-02-26 RX ADMIN — Medication 100 MICROGRAM(S): at 06:34

## 2023-02-26 RX ADMIN — Medication 5: at 15:25

## 2023-02-26 NOTE — PROGRESS NOTE ADULT - PROBLEM SELECTOR PLAN 1
Patient admitted with hypoglycemia  Endo recs reviewed - may have taken too much short acting insulin  Will check A1c, c peptide  Start glargine 10U qAM, c/w ISS    Patient lacks insight into her disease. Wants to leave but does not know why she is hospitalized. Unable to explain risks of leaving against medical advice. Lacks contingency plan. She does not demonstrate capacity to leave against medical advice. Unclear what her baseline mental status is; [facility] this admission without acute findings. Attempted to contact patient's mother via number in chart, but went to voicemail. Patient admitted with hypoglycemia  Endo recs reviewed - may have taken too much short acting insulin  Will check A1c, c peptide  Start glargine 10U qAM, c/w ISS    Patient lacks insight into her disease. Wants to leave but does not know why she is hospitalized. Unable to explain risks of leaving against medical advice. Lacks contingency plan. She does not demonstrate capacity to leave against medical advice. Unclear what her baseline mental status is; [facility] this admission without acute findings. Spoke with patient's mother via phone ([phone]) who states patient requires supervision at home, especially with administration of insulin. Mother agrees patient needs to stay in hospital and also reports concerns about patient's judgment. Patient has history of bipolar disorder per mother.     Low threshold for behavioral health evaluation. Patient admitted with hypoglycemia  Endo recs reviewed - may have taken too much short acting insulin  Will check A1c, c peptide  Start glargine 10U qAM, c/w ISS    Patient lacks insight into her disease. Wants to leave but does not know why she is hospitalized. Unable to explain risks of leaving against medical advice. Lacks contingency plan. She does not demonstrate capacity to leave against medical advice. Unclear what her baseline mental status is; [facility] this admission without acute findings. Spoke with patient's mother via phone ([phone]) who states patient requires supervision at home, especially with administration of insulin. Mother agrees patient needs to stay in hospital and also reports concerns about patient's judgment. Patient has history of bipolar disorder per mother.     Low threshold for behavioral health evaluation.  1:1 for elopement risk  olanzapine 5mg q8h PO PRN agitation, may administer IM if refuses PO

## 2023-02-26 NOTE — PROGRESS NOTE ADULT - SUBJECTIVE AND OBJECTIVE BOX
Patient is a 42y old  Female who presents with a chief complaint of Hypoglycemia possible narcotic OD (2023 11:01)      Interval History: finger sticks are increased   also continued on 100 mcg levothyroxine     MEDICATIONS  (STANDING):  bictegravir 50 mG/emtricitabine 200 mG/tenofovir alafenamide 25 mG (BIKTARVY) 1 Tablet(s) Oral daily  chlorhexidine 2% Cloths 1 Application(s) Topical <User Schedule>  dextrose 5%. 1000 milliLiter(s) (50 mL/Hr) IV Continuous <Continuous>  dextrose 5%. 1000 milliLiter(s) (100 mL/Hr) IV Continuous <Continuous>  dextrose 50% Injectable 25 Gram(s) IV Push once  dextrose 50% Injectable 12.5 Gram(s) IV Push once  dextrose 50% Injectable 25 Gram(s) IV Push once  dextrose 50% Injectable 12.5 Gram(s) IV Push once  dextrose 50% Injectable 25 Gram(s) IV Push once  enoxaparin Injectable 40 milliGRAM(s) SubCutaneous every 24 hours  glucagon  Injectable 1 milliGRAM(s) IntraMuscular once  glucagon  Injectable 1 milliGRAM(s) IntraMuscular once  insulin lispro (ADMELOG) corrective regimen sliding scale   SubCutaneous Before meals and at bedtime  insulin lispro Injectable (ADMELOG) 4 Unit(s) SubCutaneous three times a day before meals  levothyroxine 100 MICROGram(s) Oral daily    MEDICATIONS  (PRN):  dextrose Oral Gel 15 Gram(s) Oral once PRN Blood Glucose LESS THAN 70 milliGRAM(s)/deciliter  OLANZapine 5 milliGRAM(s) Oral every 8 hours PRN agitation  OLANZapine Injectable 5 milliGRAM(s) IntraMuscular every 8 hours PRN agitation      Allergies    No Known Allergies    Intolerances        REVIEW OF SYSTEMS:  CONSTITUTIONAL: no changes  EYES: No eye pain, visual disturbances, or discharge  ENMT:  No difficulty hearing, No sinus or throat pain  NECK: No pain or stiffness  RESPIRATORY: No cough, wheezing, chills or hemoptysis; No shortness of breath  CARDIOVASCULAR: No chest pain, palpitations or leg swelling  GASTROINTESTINAL: No abdominal or epigastric pain. No nausea, vomiting, or hematemesis; No diarrhea or constipation. No melena or hematochezia.  GENITOURINARY: No dysuria, frequency, hematuria, or incontinence  NEUROLOGICAL: No headaches, memory loss, loss of strength, numbness, or tremors  SKIN: No itching, burning, rashes, or lesions   ENDOCRINE: No heat or cold intolerance; No hair loss  MUSCULOSKELETAL: No joint pain or swelling; No muscle, back, or extremity pain  PSYCHIATRIC: No depression, anxiety, mood swings, or difficulty sleeping  HEME/LYMPH: No easy bruising, or bleeding gums  ALLERY AND IMMUNOLOGIC: No hives or eczema    Vital Signs Last 24 Hrs  T(C): 36.9 (2023 12:07), Max: 37.5 (2023 17:29)  T(F): 98.5 (2023 12:07), Max: 99.5 (2023 17:29)  HR: 82 (2023 12:07) (82 - 100)  BP: 108/66 (2023 12:30) (96/61 - 108/66)  BP(mean): --  RR: 18 (2023 12:07) (18 - 19)  SpO2: 96% (2023 12:07) (96% - 99%)    Parameters below as of 2023 12:07  Patient On (Oxygen Delivery Method): room air        PHYSICAL EXAM:  GENERAL:   HEAD: Atraumatic, Normocephalic  EYES: PERRLA, conjunctiva and sclera clear  ENMT: No  exudates,; Moist mucous membranes,, No lesions  NECK: Supple, No JVD, Normal thyroid  NERVOUS SYSTEM:  Alert & Oriented,   CHEST/LUNG: Clear to auscultation bilaterally; No rales, rhonchi, wheezing, or rubs  HEART: Regular rate and rhythm; No murmurs, rubs, or gallops  ABDOMEN: Soft, Nontender, Nondistended; Bowel sounds present  EXTREMITIES:  2+ Peripheral Pulses, no edema  SKIN: No rashes or lesions    LABS:    PT/INR - ( 2023 18:38 )   PT: 12.6 sec;   INR: 1.06 ratio         PTT - ( 2023 18:38 )  PTT:31.9 sec  Urinalysis Basic - ( 2023 01:00 )    Color: Yellow / Appearance: Clear / S.015 / pH: x  Gluc: x / Ketone: Trace  / Bili: Negative / Urobili: Negative mg/dL   Blood: x / Protein: 15 mg/dL / Nitrite: Negative   Leuk Esterase: Negative / RBC: 0-2 /HPF / WBC 3-5   Sq Epi: x / Non Sq Epi: Few / Bacteria: x      CAPILLARY BLOOD GLUCOSE      POCT Blood Glucose.: 366 mg/dL (2023 15:06)  POCT Blood Glucose.: 401 mg/dL (2023 15:04)  POCT Blood Glucose.: 323 mg/dL (2023 11:23)  POCT Blood Glucose.: 380 mg/dL (2023 07:41)  POCT Blood Glucose.: 200 mg/dL (2023 21:04)    Lipid panel:           Thyroid:  Diabetes Tests:  Parathyroid Panel:  Adrenals:  RADIOLOGY & ADDITIONAL TESTS:    Imaging Personally Reviewed:  [ ] YES  [ ] NO    Consultant(s) Notes Reviewed:  [ ] YES  [ ] NO    Care Discussed with Consultants/Other Providers [ ] YES  [ ] NO

## 2023-02-26 NOTE — PROGRESS NOTE ADULT - SUBJECTIVE AND OBJECTIVE BOX
Patient is a 42y old  Female who presents with a chief complaint of Hypoglycemia possible narcotic OD (2023 21:29)      SUBJECTIVE / OVERNIGHT EVENTS:    No events overnight. This AM, patient requesting to leave. States she needs to collect money from a lawsuit. She states her subcutaneous glucose monitor no longer works. She is unable to explain why she is hospitalized or what the risks of leaving the hospital are.       She reports no n/v/d/cp/sob.    MEDICATIONS  (STANDING):  bictegravir 50 mG/emtricitabine 200 mG/tenofovir alafenamide 25 mG (BIKTARVY) 1 Tablet(s) Oral daily  chlorhexidine 2% Cloths 1 Application(s) Topical <User Schedule>  dextrose 5%. 1000 milliLiter(s) (50 mL/Hr) IV Continuous <Continuous>  dextrose 5%. 1000 milliLiter(s) (100 mL/Hr) IV Continuous <Continuous>  dextrose 50% Injectable 25 Gram(s) IV Push once  dextrose 50% Injectable 12.5 Gram(s) IV Push once  dextrose 50% Injectable 25 Gram(s) IV Push once  enoxaparin Injectable 40 milliGRAM(s) SubCutaneous every 24 hours  glucagon  Injectable 1 milliGRAM(s) IntraMuscular once  insulin lispro (ADMELOG) corrective regimen sliding scale   SubCutaneous Before meals and at bedtime  levothyroxine 100 MICROGram(s) Oral daily    MEDICATIONS  (PRN):  dextrose Oral Gel 15 Gram(s) Oral once PRN Blood Glucose LESS THAN 70 milliGRAM(s)/deciliter      PHYSICAL EXAM:  T(C): 36.2 (23 @ 05:20), Max: 37.5 (23 @ 17:29)  HR: 92 (23 @ 05:20) (82 - 100)  BP: 102/60 (23 @ 05:20) (102/60 - 115/66)  RR: 19 (23 @ 05:20) (15 - 20)  SpO2: 99% (23 @ 05:20) (98% - 100%)  I&O's Summary    2023 07:01  -  2023 07:00  --------------------------------------------------------  IN: 0 mL / OUT: 600 mL / NET: -600 mL      GENERAL: NAD, well-developed, seated in bed  HEAD:  Atraumatic, Normocephalic, MMM  CHEST/LUNG: No use of accessory muscles, CTAB, breathing non-labored  COR: RR, no mrcg  ABD: Soft, ND/NT, +BS  PSYCH: AAOx3  NEUROLOGY: CN II-XII grossly intact, moving all extremities  SKIN: No rashes or lesions  EXT: wwp, no cce    LABS:  CAPILLARY BLOOD GLUCOSE      POCT Blood Glucose.: 380 mg/dL (2023 07:41)  POCT Blood Glucose.: 200 mg/dL (2023 21:04)  POCT Blood Glucose.: 303 mg/dL (2023 16:11)  POCT Blood Glucose.: 250 mg/dL (2023 11:45)  POCT Blood Glucose.: 589 mg/dL (2023 11:42)                          9.8    9.18  )-----------( 411      ( 2023 18:38 )             37.1     02-    136  |  103  |  10  ----------------------------<  19<LL>  3.2<L>   |  24  |  0.82    Ca    9.4      2023 18:38    TPro  9.5<H>  /  Alb  4.3  /  TBili  0.5  /  DBili  x   /  AST  24  /  ALT  24  /  AlkPhos  93  02-24    PT/INR - ( 2023 18:38 )   PT: 12.6 sec;   INR: 1.06 ratio         PTT - ( 2023 18:38 )  PTT:31.9 sec      Urinalysis Basic - ( 2023 01:00 )    Color: Yellow / Appearance: Clear / S.015 / pH: x  Gluc: x / Ketone: Trace  / Bili: Negative / Urobili: Negative mg/dL   Blood: x / Protein: 15 mg/dL / Nitrite: Negative   Leuk Esterase: Negative / RBC: 0-2 /HPF / WBC 3-5   Sq Epi: x / Non Sq Epi: Few / Bacteria: x          RADIOLOGY & ADDITIONAL TESTS:    Telemetry Personally Reviewed -     Imaging Personally Reviewed -     Imaging Reviewed -     Consultant(s) Notes Reviewed -       Care Discussed with Consultants/Other Providers -

## 2023-02-26 NOTE — PROGRESS NOTE ADULT - PROBLEM SELECTOR PLAN 1
add prandial lispro 4 units   Meals to be given/taken as much possible in scheduled timings. This will eliminate any fluctuations in blood glucose and help lower the incidence of hypoglycemia   can be discharged on current dose/ regimen or Patient can resume  home regimen upon discharge

## 2023-02-27 DIAGNOSIS — F06.30 MOOD DISORDER DUE TO KNOWN PHYSIOLOGICAL CONDITION, UNSPECIFIED: ICD-10-CM

## 2023-02-27 DIAGNOSIS — Z86.39 PERSONAL HISTORY OF OTHER ENDOCRINE, NUTRITIONAL AND METABOLIC DISEASE: ICD-10-CM

## 2023-02-27 LAB
A1C WITH ESTIMATED AVERAGE GLUCOSE RESULT: 7.8 % — HIGH (ref 4–5.6)
ESTIMATED AVERAGE GLUCOSE: 177 MG/DL — HIGH (ref 68–114)
GLUCOSE BLDC GLUCOMTR-MCNC: 100 MG/DL — HIGH (ref 70–99)
GLUCOSE BLDC GLUCOMTR-MCNC: 153 MG/DL — HIGH (ref 70–99)
GLUCOSE BLDC GLUCOMTR-MCNC: 187 MG/DL — HIGH (ref 70–99)
GLUCOSE BLDC GLUCOMTR-MCNC: 301 MG/DL — HIGH (ref 70–99)

## 2023-02-27 PROCEDURE — 99233 SBSQ HOSP IP/OBS HIGH 50: CPT

## 2023-02-27 PROCEDURE — 90792 PSYCH DIAG EVAL W/MED SRVCS: CPT

## 2023-02-27 PROCEDURE — 99221 1ST HOSP IP/OBS SF/LOW 40: CPT

## 2023-02-27 RX ORDER — ACETAMINOPHEN 500 MG
650 TABLET ORAL ONCE
Refills: 0 | Status: COMPLETED | OUTPATIENT
Start: 2023-02-27 | End: 2023-02-27

## 2023-02-27 RX ORDER — LANOLIN ALCOHOL/MO/W.PET/CERES
3 CREAM (GRAM) TOPICAL ONCE
Refills: 0 | Status: COMPLETED | OUTPATIENT
Start: 2023-02-27 | End: 2023-02-27

## 2023-02-27 RX ORDER — DIVALPROEX SODIUM 500 MG/1
250 TABLET, DELAYED RELEASE ORAL
Refills: 0 | Status: DISCONTINUED | OUTPATIENT
Start: 2023-02-27 | End: 2023-02-28

## 2023-02-27 RX ORDER — METFORMIN HYDROCHLORIDE 850 MG/1
500 TABLET ORAL AT BEDTIME
Refills: 0 | Status: DISCONTINUED | OUTPATIENT
Start: 2023-02-27 | End: 2023-02-28

## 2023-02-27 RX ORDER — QUETIAPINE FUMARATE 200 MG/1
25 TABLET, FILM COATED ORAL
Refills: 0 | Status: DISCONTINUED | OUTPATIENT
Start: 2023-02-27 | End: 2023-02-27

## 2023-02-27 RX ORDER — BENZOCAINE AND MENTHOL 5; 1 G/100ML; G/100ML
1 LIQUID ORAL THREE TIMES A DAY
Refills: 0 | Status: DISCONTINUED | OUTPATIENT
Start: 2023-02-27 | End: 2023-02-28

## 2023-02-27 RX ADMIN — Medication 100 MICROGRAM(S): at 05:09

## 2023-02-27 RX ADMIN — ENOXAPARIN SODIUM 40 MILLIGRAM(S): 100 INJECTION SUBCUTANEOUS at 21:55

## 2023-02-27 RX ADMIN — Medication 4: at 11:27

## 2023-02-27 RX ADMIN — METFORMIN HYDROCHLORIDE 500 MILLIGRAM(S): 850 TABLET ORAL at 21:53

## 2023-02-27 RX ADMIN — Medication 4 UNIT(S): at 16:42

## 2023-02-27 RX ADMIN — Medication 3 MILLIGRAM(S): at 21:53

## 2023-02-27 RX ADMIN — BICTEGRAVIR SODIUM, EMTRICITABINE, AND TENOFOVIR ALAFENAMIDE FUMARATE 1 TABLET(S): 30; 120; 15 TABLET ORAL at 12:55

## 2023-02-27 RX ADMIN — DIVALPROEX SODIUM 250 MILLIGRAM(S): 500 TABLET, DELAYED RELEASE ORAL at 17:06

## 2023-02-27 RX ADMIN — BENZOCAINE AND MENTHOL 1 LOZENGE: 5; 1 LIQUID ORAL at 21:43

## 2023-02-27 RX ADMIN — INSULIN GLARGINE 10 UNIT(S): 100 INJECTION, SOLUTION SUBCUTANEOUS at 08:35

## 2023-02-27 RX ADMIN — Medication 4 UNIT(S): at 08:33

## 2023-02-27 RX ADMIN — Medication 4 UNIT(S): at 11:28

## 2023-02-27 RX ADMIN — BENZOCAINE AND MENTHOL 1 LOZENGE: 5; 1 LIQUID ORAL at 07:07

## 2023-02-27 RX ADMIN — Medication 1: at 16:41

## 2023-02-27 RX ADMIN — Medication 650 MILLIGRAM(S): at 21:54

## 2023-02-27 RX ADMIN — Medication 1: at 08:34

## 2023-02-27 NOTE — BH CONSULTATION LIAISON ASSESSMENT NOTE - SUMMARY
Not Bipolar Disorder. 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  Not Bipolar Disorder. History and presentation most consistent with Cognitive Impairment due to prolonged hypoglycemia state wit associated Mood Disorder Due to General Medical Condition with main features of dysregulation, impulsivity- unclear what is causing multiple admissions for hypoglycemia, DKA

## 2023-02-27 NOTE — BH CONSULTATION LIAISON ASSESSMENT NOTE - RISK ASSESSMENT
Chronic risk factors: serious medical conditions, chronic, impacting daily functioning and independence at a young age; unclear compliance. Protective factors: female gender, no known suicide attempts; no self-injurious behavior; no legal issues; stable domicile, + family support; access to health services; denies substance use. No acute risk factors identified

## 2023-02-27 NOTE — CONSULT NOTE ADULT - SUBJECTIVE AND OBJECTIVE BOX
Patient is a 42y old  Female who presents with a chief complaint of Hypoglycemia possible narcotic OD (25 Feb 2023 08:57)      Reason For Consult:  hypoglycemia     HPI:  Hx from chart, patient unable to provide meaningful hx due to AMS       42F hx hypothyroidism DM (2)  on insulin  Drug use disorder HIV previously on HAART ?? if still taking.   hx of prolonged hypoglycemic coma .  Admissions here in the past  for both  DKA and hypoglycemia presents with hypoglycemia at home. With FS in 60's.    Arrived here obtunded with FS glucose of  19     She received an amp of D50 and narcan waking up agitated then given ativan  4mg and haldol 5mg.    Her FS glucose went from 303 back down to 44.      Additional dextrose given and now on an infusion of D5NS    IV access was difficult, L leg IO was placed for dextrose.    no HbA1C available   currently finger sticks are in low 200s and on Lispro sliding scale coverage with meals only   on 100 mcg levothyroxine but TSH 12 ( ? non-compliant )            (24 Feb 2023 20:19)      PAST MEDICAL & SURGICAL HISTORY:  HIV disease      Adult hypothyroidism      DM type 2, not at goal          FAMILY HISTORY:        Social History:    MEDICATIONS  (STANDING):  bictegravir 50 mG/emtricitabine 200 mG/tenofovir alafenamide 25 mG (BIKTARVY) 1 Tablet(s) Oral daily  chlorhexidine 2% Cloths 1 Application(s) Topical <User Schedule>  dextrose 5%. 1000 milliLiter(s) (50 mL/Hr) IV Continuous <Continuous>  dextrose 5%. 1000 milliLiter(s) (100 mL/Hr) IV Continuous <Continuous>  dextrose 50% Injectable 25 Gram(s) IV Push once  dextrose 50% Injectable 12.5 Gram(s) IV Push once  dextrose 50% Injectable 25 Gram(s) IV Push once  enoxaparin Injectable 40 milliGRAM(s) SubCutaneous every 24 hours  glucagon  Injectable 1 milliGRAM(s) IntraMuscular once  insulin lispro (ADMELOG) corrective regimen sliding scale   SubCutaneous Before meals and at bedtime  levothyroxine 100 MICROGram(s) Oral daily    MEDICATIONS  (PRN):  dextrose Oral Gel 15 Gram(s) Oral once PRN Blood Glucose LESS THAN 70 milliGRAM(s)/deciliter      REVIEW OF SYSTEMS:  CONSTITUTIONAL:  as per HPI  HEENT:  Eyes:  No diplopia or blurred vision.   ENT:  No earache, sore throat or runny nose.  CARDIOVASCULAR:  No chest pain .  RESPIRATORY:  No cough, shortness of breath, PND or orthopnea.  GASTROINTESTINAL:  No nausea, vomiting or diarrhea.  GENITOURINARY:  No dysuria, frequency or urgency. No Blood in urine  MUSCULOSKELETAL:  no joint aches, no muscle pain, myalgia  SKIN:  No change in skin, hair or nails.  NEUROLOGIC:  No paresthesias, fasciculations, seizures or weakness.  PSYCHIATRIC:  No disorder of thought or mood.  ENDOCRINE:  No heat or cold intolerance, polyuria or polydipsia. abnormal weight gain or loss, oral thrush  HEMATOLOGICAL:  No easy bruising or bleeding.     T(C): 37.5 (02-25-23 @ 17:29), Max: 37.5 (02-25-23 @ 07:15)  HR: 100 (02-25-23 @ 17:29) (82 - 151)  BP: 103/71 (02-25-23 @ 17:29) (90/60 - 133/70)  RR: 18 (02-25-23 @ 17:29) (13 - 35)  SpO2: 98% (02-25-23 @ 17:29) (69% - 100%)  Wt(kg): --    PHYSICAL EXAM:  GENERAL: NAD, well-groomed, well-developed  HEAD:  Atraumatic, Normocephalic  EYES: PERRLA, conjunctiva and sclera clear  ENMT: No  exudates,, Moist mucous membranes,, No lesions  NECK: Supple, No JVD,   NERVOUS SYSTEM:  Alert & Oriented   CHEST/LUNG: Clear to percussion bilaterally; No rales, rhonchi, wheezing, or rubs  HEART: Regular rate and rhythm; No murmurs, rubs, or gallops  ABDOMEN: Soft, Nontender, Nondistended; Bowel sounds present  EXTREMITIES:  2+ Peripheral Pulses, No clubbing, cyanosis, or edema  LYMPH: No lymphadenopathy noted  SKIN: No rashes or lesions    CAPILLARY BLOOD GLUCOSE      POCT Blood Glucose.: 200 mg/dL (25 Feb 2023 21:04)  POCT Blood Glucose.: 303 mg/dL (25 Feb 2023 16:11)  POCT Blood Glucose.: 250 mg/dL (25 Feb 2023 11:45)  POCT Blood Glucose.: 589 mg/dL (25 Feb 2023 11:42)  POCT Blood Glucose.: 302 mg/dL (25 Feb 2023 07:52)  POCT Blood Glucose.: 227 mg/dL (25 Feb 2023 05:17)  POCT Blood Glucose.: 222 mg/dL (25 Feb 2023 02:31)  POCT Blood Glucose.: 295 mg/dL (25 Feb 2023 00:28)  POCT Blood Glucose.: 325 mg/dL (24 Feb 2023 23:39)  POCT Blood Glucose.: 327 mg/dL (24 Feb 2023 22:28)                            9.8    9.18  )-----------( 411      ( 24 Feb 2023 18:38 )             37.1       CMP:  02-24 @ 18:38  SGPT 24  Albumin 4.3   Alk Phos 93   Anion Gap 9   SGOT 24   Total Bili 0.5   BUN 10   Calcium Total 9.4   CO2 24   Chloride 103   Creatinine 0.82   eGFR if AA --   eGFR if non AA --   Glucose 19   Potassium 3.2   Protein 9.5   Sodium 136      Thyroid Function Tests:  02-24 @ 18:38 TSH 12.800 FreeT4 -- T3 -- Anti TPO -- Anti Thyroglobulin Ab -- TSI --      Diabetes Tests:     Parathyroids:     Adrenals:       Radiology:             
[facility]  Division of Infectious Diseases  [phone]    RONNIE DOYLE  42y, Female  [id number]    HPI--  Patient previous known to our group under a different MRN ([id number]) with PMH HIV+ most recent CD4 and VL not known (in 2/2021 CD4 252/14% and VL undetectable) maintained on Biktarvy, DM1 with cognitive impairment due to hypoglycemic episodes, DKA, back surgeries, hypothyroidism, ?bipolar d/o, now admitted with hypoglycemia. Patient remains a completely unreliable historian, though states her most recent viral load was detectable (and in the same sentence talking about taking a car to [place] to get eyeballs tattooed).     As per HPI:  Hx from chart, patient unable to provide meaningful hx due to AMS   42F hx hypothyroidism DM (2)  on insulin  Drug use disorder HIV previously on HAART ?? if still taking.   hx of prolonged hypoglycemic coma .  Admissions here in the past  for both  DKA and hypoglycemia presents with hypoglycemia at home. With FS in 60's.  Arrived here obtunded with FS glucose of  19   She received an amp of D50 and narcan waking up agitated then given ativan  4mg and haldol 5mg.  Her FS glucose went from 303 back down to 44.    Additional dextrose given and now on an infusion of D5NS  IV access was difficult, L leg IO was placed for dextrose.   (24 Feb 2023 20:19)    During prior admission patient had been on Biktarvy and Bactrim.   Patient does not know her doctor's contact info. Spoke w patient's mother by phone. Patient's VL detectable, #s not known. Patient has not been amenable to blood work prior (though seems amenable enough now).     PMH/PSH--  HIV disease  Adult hypothyroidism  DM type 2, not at goal      Allergies--  No Known Allergies      Medications--  Antibiotics: bictegravir 50 mG/emtricitabine 200 mG/tenofovir alafenamide 25 mG (BIKTARVY) 1 Tablet(s) Oral daily      Immunologic:   Other: benzocaine/menthol Lozenge PRN  chlorhexidine 2% Cloths  dextrose 5%.  dextrose 5%.  dextrose 50% Injectable  dextrose 50% Injectable  dextrose 50% Injectable  dextrose 50% Injectable  dextrose 50% Injectable  dextrose Oral Gel PRN  divalproex Sprinkle  enoxaparin Injectable  glucagon  Injectable  glucagon  Injectable  insulin glargine Injectable (LANTUS)  insulin lispro (ADMELOG) corrective regimen sliding scale  insulin lispro Injectable (ADMELOG)  levothyroxine  metFORMIN  OLANZapine PRN  OLANZapine Injectable PRN    Antimicrobials last 90 days per EMR: MEDICATIONS  (STANDING):  bictegravir 50 mG/emtricitabine 200 mG/tenofovir alafenamide 25 mG (BIKTARVY)   1 Tablet(s) Oral (02-27-23 @ 12:55)   1 Tablet(s) Oral (02-26-23 @ 08:02)      Social History--  EtOH: denies active  Tobacco: denies active  Drug Use: denies active    Family/Marital History--  No sick contacts          Review of Systems:  A >=10-point review of systems was obtained.   Review of systems otherwise negative except as previously noted.    Physical Exam--  Vital Signs: T(F): 98.3 (02-27-23 @ 10:49), Max: 98.3 (02-27-23 @ 10:49)  HR: 95 (02-27-23 @ 10:49)  BP: 108/70 (02-27-23 @ 10:49)  RR: 18 (02-27-23 @ 10:49)  SpO2: 100% (02-27-23 @ 10:49)  Wt(kg): --  General: Nontoxic-appearing Female in no acute distress.  HEENT: AT/NC. Anicteric. Conjunctiva pink and moist. Oropharynx clear.   Neck: Not rigid. No sense of mass.  Nodes: None palpable.  Lungs: Clear bilaterally without rales, wheezing or rhonchi  Heart: Regular rate and rhythm. No Murmur. No rub. No gallop. No palpable thrill.  Abdomen: Bowel sounds present and normoactive. Soft. Nondistended. Nontender. No sense of mass. No organomegaly.  Extremities: No cyanosis or clubbing. No edema.   Skin: Warm. Dry. Good turgor. No rash. No vasculitic stigmata. Heart tattoos on face.   Psychiatric: Affect grossly appropriate, hyperactive.          Laboratory & Imaging Data--  CBC      Chemistries      Tox screen + BZD, THC    Culture Data    Culture - Urine (collected 25 Feb 2023 01:00)  Source: Clean Catch Clean Catch (Midstream)  Final Report (26 Feb 2023 16:44):    <10,000 CFU/mL Normal Urogenital Ashlee

## 2023-02-27 NOTE — PROGRESS NOTE ADULT - PROBLEM SELECTOR PLAN 1
add low dose Metformin   Biktarvy may delay metabolism of Metformin   Continue with the current  regimen while inpatient ( insulin )  if downward trend of blood glucose in AM can be discharged on current dose/ regimen including Metformin   while inpatient, finger sticks should be 100-180

## 2023-02-27 NOTE — BH CONSULTATION LIAISON ASSESSMENT NOTE - NSBHCONSULTRECOMMENDOTHER_PSY_A_CORE FT
- Patient had Seroquel before and tolerated it; can try Seroquel 25mg PO BID for now. It is sedating so may not like daytime feeling. In that case, depakote 250mg PO bid + taper to target impulsivity / affective dysregulation and taper.   - Patient ideally needs a sort of day program/ cognitive rehabilitation as if she was a patient with a CVA/traumatic brain injury as the deficits are the same and the rehab need is the same as well   - Neuropsychiatric testing would be able to shed light into the areas of impairment in Pt's cognition, however, these tests are difficult to get, very lengthy and likely Patient would not be able to tolerate it  - Patient had Seroquel before and tolerated it but was sedated. Trying depakote sprinkles 250mg PO bid + taper to target impulsivity / affective dysregulation and taper. Next dose would be 250mg PO in am and 500mg PO qhs to reduce impulsivity, affective dysregulation. No medication will reverse cognitive losses   - Patient ideally needs a sort of day program/ cognitive rehabilitation as if she was a patient with a CVA/traumatic brain injury as the deficits are the same and the rehab need is the same as well   - Neuropsychiatric testing would be able to shed light into the areas of impairment in Pt's cognition, however, these tests are difficult to get, very lengthy and likely Patient would not be able to tolerate it

## 2023-02-27 NOTE — PROGRESS NOTE ADULT - SUBJECTIVE AND OBJECTIVE BOX
Patient is a 42y old  Female who presents with a chief complaint of Hypoglycemia possible narcotic OD (25 Feb 2023 21:29)      SUBJECTIVE / OVERNIGHT EVENTS:    No events overnight. This AM, patient requesting to leave. States she needs to collect money from a lawsuit. understands today why she is in the hospital   She reports no n/v/d/cp/sob.    MEDICATIONS  (STANDING):  bictegravir 50 mG/emtricitabine 200 mG/tenofovir alafenamide 25 mG (BIKTARVY) 1 Tablet(s) Oral daily  chlorhexidine 2% Cloths 1 Application(s) Topical <User Schedule>  dextrose 5%. 1000 milliLiter(s) (50 mL/Hr) IV Continuous <Continuous>  dextrose 5%. 1000 milliLiter(s) (100 mL/Hr) IV Continuous <Continuous>  dextrose 50% Injectable 25 Gram(s) IV Push once  dextrose 50% Injectable 12.5 Gram(s) IV Push once  dextrose 50% Injectable 25 Gram(s) IV Push once  dextrose 50% Injectable 12.5 Gram(s) IV Push once  dextrose 50% Injectable 25 Gram(s) IV Push once  divalproex Sprinkle 250 milliGRAM(s) Oral two times a day  enoxaparin Injectable 40 milliGRAM(s) SubCutaneous every 24 hours  glucagon  Injectable 1 milliGRAM(s) IntraMuscular once  glucagon  Injectable 1 milliGRAM(s) IntraMuscular once  insulin glargine Injectable (LANTUS) 10 Unit(s) SubCutaneous every morning  insulin lispro (ADMELOG) corrective regimen sliding scale   SubCutaneous Before meals and at bedtime  insulin lispro Injectable (ADMELOG) 4 Unit(s) SubCutaneous three times a day before meals  levothyroxine 100 MICROGram(s) Oral daily  metFORMIN 500 milliGRAM(s) Oral at bedtime    MEDICATIONS  (PRN):  benzocaine/menthol Lozenge 1 Lozenge Oral three times a day PRN Sore Throat  dextrose Oral Gel 15 Gram(s) Oral once PRN Blood Glucose LESS THAN 70 milliGRAM(s)/deciliter  OLANZapine 5 milliGRAM(s) Oral every 8 hours PRN agitation  OLANZapine Injectable 5 milliGRAM(s) IntraMuscular every 8 hours PRN agitation    PHYSICAL EXAM:  IICU Vital Signs Last 24 Hrs  T(C): 37.4 (27 Feb 2023 16:38), Max: 37.4 (27 Feb 2023 16:38)  T(F): 99.3 (27 Feb 2023 16:38), Max: 99.3 (27 Feb 2023 16:38)  HR: 80 (27 Feb 2023 16:38) (73 - 105)  BP: 110/71 (27 Feb 2023 16:38) (95/60 - 127/65)  RR: 18 (27 Feb 2023 16:38) (18 - 18)  SpO2: 100% (27 Feb 2023 16:38) (99% - 100%)    O2 Parameters below as of 27 Feb 2023 10:49  Patient On (Oxygen Delivery Method): room air              GENERAL: NAD, well-developed, agitated walking   HEAD:  Atraumatic, Normocephalic, MMM  CHEST/LUNG: No use of accessory muscles, CTAB, breathing non-labored  COR: RR, no mrcg  ABD: Soft, ND/NT, +BS  PSYCH: AAOx3  NEUROLOGY: CN II-XII grossly intact, moving all extremities  SKIN: No rashes or lesions  EXT: wwp, no cce    LABS:  CAPILLARY BLOOD GLUCOSE    POCT  Blood Glucose (02.27.23 @ 16:35)    POCT Blood Glucose.: 153 mg/dL

## 2023-02-27 NOTE — CONSULT NOTE ADULT - ASSESSMENT
HIV+ recent CD4 and VL not known  VL reportedly detectable, unclear if a 'blip' or emerging resistance evident  No role to emergently change Rx  Optimizing behavioral issues would be most productive.     Check CD4  Await VL  Continue Biktarvy  Will reach out to her clinic to obtain most recent data ([phone] or [phone]).    Thank you for the courtesy of this referral.    Christoph Hobson MD  Attending Physician  [facility]  Division of Infectious Diseases  [phone]      
hypoglycemia   Hypothyroidism

## 2023-02-27 NOTE — PROGRESS NOTE ADULT - PROBLEM SELECTOR PLAN 1
Patient admitted with hypoglycemia  Endo recs reviewed - may have taken too much short acting insulin  Will check A1c, c peptide  Start glargine 10U qAM, c/w ISS now with metformiun     Patient lacks insight into her disease. Wants to leave but does not know why she is hospitalized. Unable to explain risks of leaving against medical advice. Lacks contingency plan. She does not demonstrate capacity to leave against medical advice. Unclear what her baseline mental status is; [facility] this admission without acute findings. Spoke with patient's mother via phone ([phone]) who states patient requires supervision at home, especially with administration of insulin. Mother agrees patient needs to stay in hospital and also reports concerns about patient's judgment. Patient has history of bipolar disorder per mother.     Low threshold for behavioral health evaluation.  1:1 for elopement risk  olanzapine 5mg q8h PO PRN agitation, may administer IM if refuses PO

## 2023-02-27 NOTE — BH CONSULTATION LIAISON ASSESSMENT NOTE - NSBHCONSULTFOLLOWAFTERCARE_PSY_A_CORE FT
Patient ideally needs a sort of day program/ cognitive rehabilitation as if she was a patient with a CVA/traumatic brain injury as the deficits are the same and the rehab need is the same as well. Neuropsychiatric testing would be able to shed light into the areas of impairment in Pt's cognition, however, these tests are difficult to get, very lengthy and likely Patient would not be able to tolerate it

## 2023-02-27 NOTE — BH CONSULTATION LIAISON ASSESSMENT NOTE - NSBHCHARTREVIEWLAB_PSY_A_CORE FT
136  |  103  |  10  ----------------------------<  19<LL>  3.2<L>   |  24  |  0.82    Ca    9.4      24 Feb 2023 18:38    TPro  9.5<H>  /  Alb  4.3  /  TBili  0.5  /  DBili  x   /  AST  24  /  ALT  24  /  AlkPhos  93  02-24    LIVER FUNCTIONS - ( 24 Feb 2023 18:38 )  Alb: 4.3 g/dL / Pro: 9.5 gm/dL / ALK PHOS: 93 U/L / ALT: 24 U/L / AST: 24 U/L / GGT: x

## 2023-02-27 NOTE — BH CONSULTATION LIAISON ASSESSMENT NOTE - OTHER
varying  "where is everyone?"  unable to tolerate MMSE at this time  at times irritable, at times more calm, euthymic  tenuous  superficially cooperative  concrete, at times linear, at times off topic  some lability which appears to be baseline  numerous small hearts on face (drawn out v tattoos); gel manicure  low end of fair  halting  see above  concrete, at times linear, at times off topic, often repeats herself; tends to perseverate

## 2023-02-27 NOTE — BH CONSULTATION LIAISON ASSESSMENT NOTE - NSBHMSETHTPROC_PSY_A_CORE
February 23, 2022     Patient: Dillon Dickinson   YOB: 1987   Date of Visit: 2/23/2022       To Whom it May Concern:    Dillon Dickinson was seen in my clinic on 2/23/2022 at 1:30 pm.    Due to his medical condition, he has been off work since 2/19/22. He is ready to return to work without restrictions beginning 2/25/22.     Sincerely,         Jake Corrales, DO    Medical information is confidential and cannot be disclosed without the written consent of the patient or his representative.       Other

## 2023-02-27 NOTE — BH CONSULTATION LIAISON ASSESSMENT NOTE - CURRENT MEDICATION
MEDICATIONS  (STANDING):  bictegravir 50 mG/emtricitabine 200 mG/tenofovir alafenamide 25 mG (BIKTARVY) 1 Tablet(s) Oral daily  chlorhexidine 2% Cloths 1 Application(s) Topical <User Schedule>  dextrose 5%. 1000 milliLiter(s) (50 mL/Hr) IV Continuous <Continuous>  dextrose 5%. 1000 milliLiter(s) (100 mL/Hr) IV Continuous <Continuous>  dextrose 50% Injectable 25 Gram(s) IV Push once  dextrose 50% Injectable 12.5 Gram(s) IV Push once  dextrose 50% Injectable 25 Gram(s) IV Push once  dextrose 50% Injectable 12.5 Gram(s) IV Push once  dextrose 50% Injectable 25 Gram(s) IV Push once  enoxaparin Injectable 40 milliGRAM(s) SubCutaneous every 24 hours  glucagon  Injectable 1 milliGRAM(s) IntraMuscular once  glucagon  Injectable 1 milliGRAM(s) IntraMuscular once  insulin glargine Injectable (LANTUS) 10 Unit(s) SubCutaneous every morning  insulin lispro (ADMELOG) corrective regimen sliding scale   SubCutaneous Before meals and at bedtime  insulin lispro Injectable (ADMELOG) 4 Unit(s) SubCutaneous three times a day before meals  levothyroxine 100 MICROGram(s) Oral daily    MEDICATIONS  (PRN):  benzocaine/menthol Lozenge 1 Lozenge Oral three times a day PRN Sore Throat  dextrose Oral Gel 15 Gram(s) Oral once PRN Blood Glucose LESS THAN 70 milliGRAM(s)/deciliter  OLANZapine 5 milliGRAM(s) Oral every 8 hours PRN agitation  OLANZapine Injectable 5 milliGRAM(s) IntraMuscular every 8 hours PRN agitation

## 2023-02-27 NOTE — BH CONSULTATION LIAISON ASSESSMENT NOTE - HPI (INCLUDE ILLNESS QUALITY, SEVERITY, DURATION, TIMING, CONTEXT, MODIFYING FACTORS, ASSOCIATED SIGNS AND SYMPTOMS)
42F, noncaregiver, domiciled with parents and sister in a private home, PMH of hypothyroidism, DMII (on insulin), HIV (previously on HAART), admitted for  ?? if still taking; hx of DKA and prolonged hypoglycemic coma, admitted to ICU for hypoglycemia - Patient arrived from home obtunded with FS glucose of  19       ISTOP Reference #: [id number] no record of Patient  CVM  ** ANOTHER SUNRISE MRN [id number] with relevant clinical information.  42F, noncaregiver, domiciled with parents and sister in a private home, PMH of hypothyroidism, DMII (on insulin), HIV (previously on HAART), admitted for  ?? if still taking; hx of DKA and prolonged hypoglycemic coma spanning months with irreversible cognitive sequela secondary to a protracted hypoglycemic state (MSE never returned to baseline; overall functioning declined - needed ADL assistance, was able to communicate basic needs but unable to string sentences together. “) Patient was seen by Neurology before who felt "Catatonia in setting of TME, possibly element of HIV encephalopathy/dementia worsened by episodes of hypglycemia." Patient this time was admitted to ICU for hypoglycemia - Patient arrived from home obtunded with FS glucose of  19; downgraded 2/25/23. Consult called for "Bipolar Disorder" - no indication in pt's history that she was formally diagnosed with Bipolar Disorder.     RELEVANT HISTORY 2/11/21 ID MD note: “mother notes that patient recently returned home from [facility] after being at a rehab center for about a year.  In 12/19, patient was found down and hypoglycemic for an unknown time.  Patient reportedly was in a coma for months and never returned back to baseline mental status.  Patient reportedly has a wobbly gait but walks without assistance devices.  Able to communicate basic needs but unable to string sentences together. “    ISTOP Reference #: [id number] no record of Patient  CVM no record of patient  ** ANOTHER SUNRISE MRN [id number] with relevant clinical information.  42AAF, noncaregiver, domiciled with parents and sister in a private home, PMH of hypothyroidism, DMII (on insulin), HIV (previously on HAART), charted hx of "substance use" (details unknown) admitted for  ?? if still taking; hx of DKA and prolonged hypoglycemic coma spanning months with irreversible cognitive sequela secondary to a protracted hypoglycemic state (MSE never returned to baseline; overall functioning declined - needed ADL assistance, was able to communicate basic needs but unable to string sentences together. “) Patient was seen by Neurology before who felt "Catatonia in setting of TME, possibly element of HIV encephalopathy/dementia worsened by episodes of hypglycemia." Patient this time was admitted to ICU for hypoglycemia - Patient arrived from home obtunded with FS glucose of  19; downgraded 2/25/23. Consult called for "Bipolar Disorder" - no indication in pt's history that she was formally diagnosed with Bipolar Disorder.     RELEVANT HISTORY 2/11/21 ID MD note: “mother notes that patient recently returned home from [facility] after being at a rehab center for about a year.  In 12/19, patient was found down and hypoglycemic for an unknown time.  Patient reportedly was in a coma for months and never returned back to baseline mental status.  Patient reportedly has a wobbly gait but walks without assistance devices.  Able to communicate basic needs but unable to string sentences together. “    ISTOP Reference #: [id number] no record of Patient  CVM no record of patient     EXAM: + some impairment in hearing requiring repeated questions and removal of mask; some pacing / at times yelling for certain doctors to come see her, limited frustration tolerance and needs redirection. Cursing at times complaining over still being in the hospital. Tells Writer that her implnated insulin detector was not functioning properly, but "it was replaced," she needs test strips and to see an Endocrinologist as she does not have one as an outpatient. Patient also then asked about her viral load as she has been on Biktarvy for 3 years andf was recently told "my levels was detectable." + maintains attention with effort, some latency / delay in information processing with answer to posed question being off. Denies change in baseline mood, reports adequate sleep, appetite, denies suicidal ideation.   ** ANOTHER SUNRISE MRN [id number] with relevant clinical information.  42AAF, noncaregiver, domiciled with parents and sister in a private home, PMH of hypothyroidism, DMII (on insulin), HIV (previously on HAART), charted hx of "substance use" (details unknown) admitted for  ?? if still taking; hx of DKA and prolonged hypoglycemic coma spanning months with irreversible cognitive sequela secondary to a protracted hypoglycemic state (MSE never returned to baseline; overall functioning declined - needed ADL assistance, was able to communicate basic needs but unable to string sentences together. “) Patient was seen by Neurology before who felt "Catatonia in setting of TME, possibly element of HIV encephalopathy/dementia worsened by episodes of hypglycemia." Patient this time was admitted to ICU for hypoglycemia - Patient arrived from home obtunded with FS glucose of  19; downgraded 2/25/23. Consult called for "Bipolar Disorder" - no indication in pt's history that she was formally diagnosed with Bipolar Disorder.     RELEVANT HISTORY 2/11/21 ID MD note: “mother notes that patient recently returned home from [facility] after being at a rehab center for about a year.  In 12/19, patient was found down and hypoglycemic for an unknown time.  Patient reportedly was in a coma for months and never returned back to baseline mental status.  Patient reportedly has a wobbly gait but walks without assistance devices.  Able to communicate basic needs but unable to string sentences together. “    ISTOP Reference #: [id number] no record of Patient  CVM no record of patient     EXAM: + some impairment in hearing requiring repeated questions and removal of mask; some pacing / at times yelling for certain doctors to come see her, limited frustration tolerance and needs redirection. Cursing at times complaining over still being in the hospital. Tells Writer that her implanted insulin detector was not functioning properly, but "it was replaced," she needs test strips and to see an Endocrinologist as she does not have one as an outpatient. Patient also then asked about her viral load as she has been on Biktarvy for 3 years andf was recently told "my levels was detectable." + maintains attention with effort, some latency / delay in information processing with answer to posed question being off. Denies change in baseline mood, reports adequate sleep, appetite, denies suicidal ideation.  Denies ever having seen a psychiatrist before.  ** ANOTHER SUNRISE MRN [id number] with relevant clinical information.  42AAF, noncaregiver, domiciled with parents and sister in a private home, PMH of hypothyroidism, DMII (on insulin), HIV (previously on HAART), charted hx of "substance use" (details unknown) admitted for  ?? if still taking; hx of DKA and prolonged hypoglycemic coma spanning months with irreversible cognitive sequela secondary to a protracted hypoglycemic state (MSE never returned to baseline; overall functioning declined - needed ADL assistance, was able to communicate basic needs but unable to string sentences together. “) Patient was seen by Neurology before who felt "Catatonia in setting of TME, possibly element of HIV encephalopathy/dementia worsened by episodes of hypglycemia." Patient this time was admitted to ICU for hypoglycemia - Patient arrived from home obtunded with FS glucose of  19; downgraded 2/25/23. Consult called for "Bipolar Disorder" - no indication in pt's history that she was formally diagnosed with Bipolar Disorder.     RELEVANT HISTORY 2/11/21 ID MD note: “mother notes that patient recently returned home from [facility] after being at a rehab center for about a year.  In 12/19, patient was found down and hypoglycemic for an unknown time.  Patient reportedly was in a coma for months and never returned back to baseline mental status.  Patient reportedly has a wobbly gait but walks without assistance devices.  Able to communicate basic needs but unable to string sentences together. “    ISTOP Reference #: [id number] no record of Patient  CVM no record of patient     EXAM: + some impairment in hearing requiring repeated questions and removal of mask; some pacing / at times yelling for certain doctors to come see her, limited frustration tolerance and needs redirection. + halting speech, often repeats herself. Cursing at times complaining over still being in the hospital. Tells Writer that her implanted insulin detector was not functioning properly, but "it was replaced," she needs test strips and to see an Endocrinologist as she does not have one as an outpatient. Patient also then asked about her viral load as she has been on Biktarvy for 3 years andf was recently told "my levels was detectable." + maintains attention with effort, some latency / delay in information processing with answer to posed question being off. Denies change in baseline mood, reports adequate sleep, appetite, denies suicidal ideation.  Denies ever having seen a psychiatrist before. Later overheard to be on the phone with someone and talking about moving to [place].

## 2023-02-27 NOTE — BH CONSULTATION LIAISON ASSESSMENT NOTE - NSBHCHARTREVIEWVS_PSY_A_CORE FT
Vital Signs Last 24 Hrs  T(C): 36.8 (27 Feb 2023 10:49), Max: 36.8 (27 Feb 2023 10:49)  T(F): 98.3 (27 Feb 2023 10:49), Max: 98.3 (27 Feb 2023 10:49)  HR: 95 (27 Feb 2023 10:49) (73 - 105)  BP: 108/70 (27 Feb 2023 10:49) (95/60 - 127/65)  BP(mean): --  RR: 18 (27 Feb 2023 10:49) (18 - 18)  SpO2: 100% (27 Feb 2023 10:49) (99% - 100%)    Parameters below as of 27 Feb 2023 10:49  Patient On (Oxygen Delivery Method): room air

## 2023-02-27 NOTE — BH CONSULTATION LIAISON ASSESSMENT NOTE - ABUSE / TRAUMA HISTORY
Office to call with appointment date/time for Monday with Dr. Burgess  [phone]    Hearing Test scheduled for January 29th at 11:00 a.m. with Kaur . Please come to Labor and Delivery window after registering in ER or Same Day Surgery.   No

## 2023-02-27 NOTE — PROGRESS NOTE ADULT - SUBJECTIVE AND OBJECTIVE BOX
Patient is a 42y old  Female who presents with a chief complaint of Hypoglycemia possible narcotic OD (26 Feb 2023 17:09)      Interval History: pt on increased insulin , Lantus 12 units and prandial lispro 4 units   increased Insulin Resistance with finger sticks are in 300s   normal Creatinine     MEDICATIONS  (STANDING):  bictegravir 50 mG/emtricitabine 200 mG/tenofovir alafenamide 25 mG (BIKTARVY) 1 Tablet(s) Oral daily  chlorhexidine 2% Cloths 1 Application(s) Topical <User Schedule>  dextrose 5%. 1000 milliLiter(s) (50 mL/Hr) IV Continuous <Continuous>  dextrose 5%. 1000 milliLiter(s) (100 mL/Hr) IV Continuous <Continuous>  dextrose 50% Injectable 25 Gram(s) IV Push once  dextrose 50% Injectable 12.5 Gram(s) IV Push once  dextrose 50% Injectable 25 Gram(s) IV Push once  dextrose 50% Injectable 12.5 Gram(s) IV Push once  dextrose 50% Injectable 25 Gram(s) IV Push once  divalproex Sprinkle 250 milliGRAM(s) Oral two times a day  enoxaparin Injectable 40 milliGRAM(s) SubCutaneous every 24 hours  glucagon  Injectable 1 milliGRAM(s) IntraMuscular once  glucagon  Injectable 1 milliGRAM(s) IntraMuscular once  insulin glargine Injectable (LANTUS) 10 Unit(s) SubCutaneous every morning  insulin lispro (ADMELOG) corrective regimen sliding scale   SubCutaneous Before meals and at bedtime  insulin lispro Injectable (ADMELOG) 4 Unit(s) SubCutaneous three times a day before meals  levothyroxine 100 MICROGram(s) Oral daily    MEDICATIONS  (PRN):  benzocaine/menthol Lozenge 1 Lozenge Oral three times a day PRN Sore Throat  dextrose Oral Gel 15 Gram(s) Oral once PRN Blood Glucose LESS THAN 70 milliGRAM(s)/deciliter  OLANZapine 5 milliGRAM(s) Oral every 8 hours PRN agitation  OLANZapine Injectable 5 milliGRAM(s) IntraMuscular every 8 hours PRN agitation      Allergies    No Known Allergies    Intolerances        REVIEW OF SYSTEMS:  CONSTITUTIONAL: no changes  EYES: No eye pain, visual disturbances, or discharge  ENMT:  No difficulty hearing, No sinus or throat pain  NECK: No pain or stiffness  RESPIRATORY: No cough, wheezing, chills or hemoptysis; No shortness of breath  CARDIOVASCULAR: No chest pain, palpitations or leg swelling  GASTROINTESTINAL: No abdominal or epigastric pain. No nausea, vomiting, or hematemesis; No diarrhea or constipation. No melena or hematochezia.  GENITOURINARY: No dysuria, frequency, hematuria, or incontinence  NEUROLOGICAL: No headaches, memory loss, loss of strength, numbness, or tremors  SKIN: No itching, burning, rashes, or lesions   ENDOCRINE: No heat or cold intolerance; No hair loss  MUSCULOSKELETAL: No joint pain or swelling; No muscle, back, or extremity pain  PSYCHIATRIC: No depression, anxiety, mood swings, or difficulty sleeping  HEME/LYMPH: No easy bruising, or bleeding gums  ALLERY AND IMMUNOLOGIC: No hives or eczema    Vital Signs Last 24 Hrs  T(C): 36.8 (27 Feb 2023 10:49), Max: 36.8 (27 Feb 2023 10:49)  T(F): 98.3 (27 Feb 2023 10:49), Max: 98.3 (27 Feb 2023 10:49)  HR: 95 (27 Feb 2023 10:49) (73 - 105)  BP: 108/70 (27 Feb 2023 10:49) (95/60 - 127/65)  BP(mean): --  RR: 18 (27 Feb 2023 10:49) (18 - 18)  SpO2: 100% (27 Feb 2023 10:49) (99% - 100%)    Parameters below as of 27 Feb 2023 10:49  Patient On (Oxygen Delivery Method): room air        PHYSICAL EXAM:  GENERAL:   HEAD: Atraumatic, Normocephalic  EYES: PERRLA, conjunctiva and sclera clear  ENMT: No  exudates,; Moist mucous membranes,, No lesions  NECK: Supple, No JVD, Normal thyroid  NERVOUS SYSTEM:  Alert & Oriented,   CHEST/LUNG: Clear to auscultation bilaterally; No rales, rhonchi, wheezing, or rubs  HEART: Regular rate and rhythm; No murmurs, rubs, or gallops  ABDOMEN: Soft, Nontender, Nondistended; Bowel sounds present  EXTREMITIES:  2+ Peripheral Pulses, no edema  SKIN: No rashes or lesions    LABS:        CAPILLARY BLOOD GLUCOSE      POCT Blood Glucose.: 301 mg/dL (27 Feb 2023 11:24)  POCT Blood Glucose.: 187 mg/dL (27 Feb 2023 08:22)  POCT Blood Glucose.: 373 mg/dL (26 Feb 2023 21:15)    Lipid panel:           Thyroid:  Diabetes Tests:  Parathyroid Panel:  Adrenals:  RADIOLOGY & ADDITIONAL TESTS:    Imaging Personally Reviewed:  [ ] YES  [ ] NO    Consultant(s) Notes Reviewed:  [ ] YES  [ ] NO    Care Discussed with Consultants/Other Providers [ ] YES  [ ] NO

## 2023-02-28 ENCOUNTER — TRANSCRIPTION ENCOUNTER (OUTPATIENT)
Age: 43
End: 2023-02-28

## 2023-02-28 VITALS
HEART RATE: 71 BPM | OXYGEN SATURATION: 100 % | RESPIRATION RATE: 18 BRPM | DIASTOLIC BLOOD PRESSURE: 70 MMHG | SYSTOLIC BLOOD PRESSURE: 105 MMHG | TEMPERATURE: 98 F

## 2023-02-28 LAB
GLUCOSE BLDC GLUCOMTR-MCNC: 222 MG/DL — HIGH (ref 70–99)
GLUCOSE BLDC GLUCOMTR-MCNC: 315 MG/DL — HIGH (ref 70–99)
HIV-1 VIRAL LOAD RESULT: SIGNIFICANT CHANGE UP
HIV1 RNA # SERPL NAA+PROBE: SIGNIFICANT CHANGE UP COPIES/ML
HIV1 RNA SER-IMP: SIGNIFICANT CHANGE UP
HIV1 RNA SERPL NAA+PROBE-ACNC: SIGNIFICANT CHANGE UP
HIV1 RNA SERPL NAA+PROBE-LOG#: SIGNIFICANT CHANGE UP LG COP/ML

## 2023-02-28 PROCEDURE — 99239 HOSP IP/OBS DSCHRG MGMT >30: CPT

## 2023-02-28 PROCEDURE — 99231 SBSQ HOSP IP/OBS SF/LOW 25: CPT

## 2023-02-28 RX ORDER — DIVALPROEX SODIUM 500 MG/1
2 TABLET, DELAYED RELEASE ORAL
Qty: 120 | Refills: 0
Start: 2023-02-28 | End: 2023-03-29

## 2023-02-28 RX ORDER — LEVOTHYROXINE SODIUM 125 MCG
1 TABLET ORAL
Qty: 0 | Refills: 0 | DISCHARGE
Start: 2023-02-28

## 2023-02-28 RX ORDER — METFORMIN HYDROCHLORIDE 850 MG/1
1 TABLET ORAL
Qty: 30 | Refills: 0
Start: 2023-02-28 | End: 2023-03-29

## 2023-02-28 RX ADMIN — Medication 2: at 12:03

## 2023-02-28 RX ADMIN — Medication 4 UNIT(S): at 12:04

## 2023-02-28 RX ADMIN — INSULIN GLARGINE 10 UNIT(S): 100 INJECTION, SOLUTION SUBCUTANEOUS at 08:38

## 2023-02-28 RX ADMIN — DIVALPROEX SODIUM 250 MILLIGRAM(S): 500 TABLET, DELAYED RELEASE ORAL at 05:30

## 2023-02-28 RX ADMIN — Medication 4 UNIT(S): at 08:37

## 2023-02-28 RX ADMIN — Medication 100 MICROGRAM(S): at 05:30

## 2023-02-28 RX ADMIN — BICTEGRAVIR SODIUM, EMTRICITABINE, AND TENOFOVIR ALAFENAMIDE FUMARATE 1 TABLET(S): 30; 120; 15 TABLET ORAL at 12:07

## 2023-02-28 RX ADMIN — Medication 4: at 08:36

## 2023-02-28 NOTE — BH CONSULTATION LIAISON PROGRESS NOTE - NSBHCONSULTRECOMMENDOTHER_PSY_A_CORE FT
2/27/23: Patient had Seroquel before and tolerated it but was sedated. Trying depakote sprinkles 250mg PO bid + taper to target impulsivity / affective dysregulation and taper. Next dose would be 250mg PO in am and 500mg PO qhs to reduce impulsivity, affective dysregulation. No medication will reverse cognitive losses   - Patient ideally needs a sort of day program/ cognitive rehabilitation as if she was a patient with a CVA/traumatic brain injury as the deficits are the same and the rehab need is the same as well   - Neuropsychiatric testing would be able to shed light into the areas of impairment in Pt's cognition, however, these tests are difficult to get, very lengthy and likely Patient would not be able to tolerate it   2/28/23: continue Deapkote sprinkles 250mg PO bid; if no significant clinical response is seen after 1 week, next dose increment would be 250mg PO in am and 500mg PO qhs to reduce impulsivity, affective dysregulation.

## 2023-02-28 NOTE — BH CONSULTATION LIAISON PROGRESS NOTE - NSBHCHARTREVIEWVS_PSY_A_CORE FT
Vital Signs Last 24 Hrs  T(C): 36.7 (28 Feb 2023 11:05), Max: 37.4 (27 Feb 2023 16:38)  T(F): 98.1 (28 Feb 2023 11:05), Max: 99.3 (27 Feb 2023 16:38)  HR: 71 (28 Feb 2023 11:05) (71 - 80)  BP: 105/70 (28 Feb 2023 11:05) (101/60 - 110/71)  BP(mean): --  RR: 18 (28 Feb 2023 11:05) (17 - 18)  SpO2: 100% (28 Feb 2023 11:05) (100% - 100%)    Parameters below as of 28 Feb 2023 11:05  Patient On (Oxygen Delivery Method): room air

## 2023-02-28 NOTE — PROGRESS NOTE ADULT - REASON FOR ADMISSION
Hypoglycemia possible narcotic OD

## 2023-02-28 NOTE — DISCHARGE NOTE PROVIDER - CARE PROVIDER_API CALL
Isidro Lopez)  EndocrinologyMetabDiabetes  [address], [address]  [place] [id number]  Phone: [phone]  Fax: [phone]  Follow Up Time:

## 2023-02-28 NOTE — BH CONSULTATION LIAISON PROGRESS NOTE - NSBHFUPINTERVALHXFT_PSY_A_CORE
Much appreciate ID and Endocrinology consultations. Patient started on the depakote sprinkles 250mg PO bid yesterday - too early to ascertain clinic response, also likely will need a higher total daily dose before target symptoms start to respond. Denies and does not manifest any adverse medication side effects. As per SW on the case, family confirmed Patient does not have a formal past psychiatric history and never was formally diagnosed for "Bipolar." Main issue remains that Patient spends her days at home, smoking cigarettes and being on social media. She is not able to function independently, work and has no access to resources such as a day program or cognitive rehabilitation.

## 2023-02-28 NOTE — DISCHARGE NOTE NURSING/CASE MANAGEMENT/SOCIAL WORK - NSDCPEFALRISK_GEN_ALL_CORE
For information on Fall & Injury Prevention, visit: [email or web address] OR  [email or web address] OR  https://www.cdc.gov/steadi/patient.html

## 2023-02-28 NOTE — PROGRESS NOTE ADULT - PROBLEM SELECTOR PLAN 1
stable , Continue with the current  regimen while inpatient   can be discharged on current dose/ regimen

## 2023-02-28 NOTE — CHART NOTE - NSCHARTNOTEFT_GEN_A_CORE
Spoke with patient's mother again by phone after discharge.  HIV VL nondetectable (in contrast to their prior concerns).  No CD4 back  I had left a message with the patient's clinic yesterday but have not heard back as of yet.  Christoph Hobson MD  Attending Physician  [facility]  Division of Infectious Diseases  [phone]

## 2023-02-28 NOTE — BH CONSULTATION LIAISON PROGRESS NOTE - OTHER
numerous small hearts on face (drawn out v tattoos); gel manicure  varying  see above  at times irritable, at times more calm, euthymic  tenuous  low end of fair  unable to tolerate MMSE at this time  "where is everyone?"  halting  concrete, at times linear, at times off topic, often repeats herself; tends to perseverate  some lability which appears to be baseline  superficially cooperative

## 2023-02-28 NOTE — BH CONSULTATION LIAISON PROGRESS NOTE - NSBHCONSULTFOLLOWAFTERCARE_PSY_A_CORE FT
HOME RECS: if no significant clinical response is seen after 1 week, next dose increment would be 250mg PO in am and 500mg PO qhs to reduce impulsivity, affective dysregulation.   - ideally, should be followed by Neurology / NeuroCognitive Disorder clinic

## 2023-02-28 NOTE — PROGRESS NOTE ADULT - PROBLEM SELECTOR PLAN 2
TSH 12.8  Endo following  c/w levothyroxine 100mg daily  Repeat TFTs in a few days
continue with current dose levothyroxine
TSH 12.8  Endo following  c/w levothyroxine 100mg daily  Repeat TFTs in a few days
Continue with the current  regimen while inpatient   Check thyroid function tests in a few days to correct TSH to 2.0 or so   The recovery of TSH may lag behind time wise compared to  thyroid hormones like T4 and T3

## 2023-02-28 NOTE — DISCHARGE NOTE PROVIDER - NSDCMRMEDTOKEN_GEN_ALL_CORE_FT
Basaglar KwikPen 100 units/mL subcutaneous solution: 10 unit(s) subcutaneous once a day (at bedtime)  Biktarvy 50 mg-200 mg-25 mg oral tablet: 1 tab(s) orally once a day  divalproex sodium 125 mg oral delayed release capsule: 2 cap(s) orally 2 times a day  Fiasp 100 units/mL injectable solution: 4 unit(s) injectable 3 times a day (before meals)  levothyroxine 100 mcg (0.1 mg) oral tablet: 1 tab(s) orally once a day  metFORMIN 500 mg oral tablet: 1 tab(s) orally once a day (at bedtime)

## 2023-02-28 NOTE — DISCHARGE NOTE PROVIDER - HOSPITAL COURSE
41 y/o F w/HIV on ART and T2DM admitted for hypoglycemia, now resolved.       Problem/Plan - 1:  ·  Problem: Hypoglycemia.   ·  Plan: Patient admitted with hypoglycemia  Endo recs reviewed - may have taken too much short acting insulin  Start glargine 10U qAM, c/w ISS now with metformiun     Patient lacks insight into her disease. Wants to leave but does not know why she is hospitalized. Unable to explain risks of leaving against medical advice. Lacks contingency plan. She does not demonstrate capacity to leave against medical advice. Unclear what her baseline mental status is; CTH this admission without acute findings. Spoke with patient's mother via phone ([phone]) who states patient requires supervision at home, especially with administration of insulin. Mother agrees patient needs to stay in hospital and also reports concerns about patient's judgment. Patient has history of bipolar disorder per mother.     Low threshold for behavioral health evaluation.  1:1 for elopement risk  olanzapine 5mg q8h PO PRN agitation, may administer IM if refuses PO.    Problem/Plan - 2:  ·  Problem: Hypothyroidism.   ·  Plan: TSH 12.8  Endo following  c/w levothyroxine 100mg daily  Repeat TFTs in a few days.    Problem/Plan - 3:  ·  Problem: HIV disease.   ·  Plan: c/w ART  check viral load.    Patient cleared for discharge home with current regimen.

## 2023-02-28 NOTE — BH CONSULTATION LIAISON PROGRESS NOTE - CURRENT MEDICATION
MEDICATIONS  (STANDING):  bictegravir 50 mG/emtricitabine 200 mG/tenofovir alafenamide 25 mG (BIKTARVY) 1 Tablet(s) Oral daily  chlorhexidine 2% Cloths 1 Application(s) Topical <User Schedule>  dextrose 5%. 1000 milliLiter(s) (50 mL/Hr) IV Continuous <Continuous>  dextrose 5%. 1000 milliLiter(s) (100 mL/Hr) IV Continuous <Continuous>  dextrose 50% Injectable 25 Gram(s) IV Push once  dextrose 50% Injectable 12.5 Gram(s) IV Push once  dextrose 50% Injectable 25 Gram(s) IV Push once  dextrose 50% Injectable 12.5 Gram(s) IV Push once  dextrose 50% Injectable 25 Gram(s) IV Push once  divalproex Sprinkle 250 milliGRAM(s) Oral two times a day  enoxaparin Injectable 40 milliGRAM(s) SubCutaneous every 24 hours  glucagon  Injectable 1 milliGRAM(s) IntraMuscular once  glucagon  Injectable 1 milliGRAM(s) IntraMuscular once  insulin glargine Injectable (LANTUS) 10 Unit(s) SubCutaneous every morning  insulin lispro (ADMELOG) corrective regimen sliding scale   SubCutaneous Before meals and at bedtime  insulin lispro Injectable (ADMELOG) 4 Unit(s) SubCutaneous three times a day before meals  levothyroxine 100 MICROGram(s) Oral daily  metFORMIN 500 milliGRAM(s) Oral at bedtime    MEDICATIONS  (PRN):  benzocaine/menthol Lozenge 1 Lozenge Oral three times a day PRN Sore Throat  dextrose Oral Gel 15 Gram(s) Oral once PRN Blood Glucose LESS THAN 70 milliGRAM(s)/deciliter  OLANZapine 5 milliGRAM(s) Oral every 8 hours PRN agitation  OLANZapine Injectable 5 milliGRAM(s) IntraMuscular every 8 hours PRN agitation

## 2023-02-28 NOTE — DISCHARGE NOTE NURSING/CASE MANAGEMENT/SOCIAL WORK - PATIENT PORTAL LINK FT
You can access the FollowMyHealth Patient Portal offered by [facility] by registering at the following website: [email or web address]. By joining [facility]’s FollowMyHealth portal, you will also be able to view your health information using other applications (apps) compatible with our system.

## 2023-02-28 NOTE — PROGRESS NOTE ADULT - SUBJECTIVE AND OBJECTIVE BOX
Patient is a 42y old  Female who presents with a chief complaint of Hypoglycemia possible narcotic OD (28 Feb 2023 08:00)      Interval History: finger sticks are stable  and trending downwards   discharge planning is on       MEDICATIONS  (STANDING):  bictegravir 50 mG/emtricitabine 200 mG/tenofovir alafenamide 25 mG (BIKTARVY) 1 Tablet(s) Oral daily  chlorhexidine 2% Cloths 1 Application(s) Topical <User Schedule>  dextrose 5%. 1000 milliLiter(s) (50 mL/Hr) IV Continuous <Continuous>  dextrose 5%. 1000 milliLiter(s) (100 mL/Hr) IV Continuous <Continuous>  dextrose 50% Injectable 12.5 Gram(s) IV Push once  dextrose 50% Injectable 25 Gram(s) IV Push once  dextrose 50% Injectable 25 Gram(s) IV Push once  dextrose 50% Injectable 12.5 Gram(s) IV Push once  dextrose 50% Injectable 25 Gram(s) IV Push once  divalproex Sprinkle 250 milliGRAM(s) Oral two times a day  enoxaparin Injectable 40 milliGRAM(s) SubCutaneous every 24 hours  glucagon  Injectable 1 milliGRAM(s) IntraMuscular once  glucagon  Injectable 1 milliGRAM(s) IntraMuscular once  insulin glargine Injectable (LANTUS) 10 Unit(s) SubCutaneous every morning  insulin lispro (ADMELOG) corrective regimen sliding scale   SubCutaneous Before meals and at bedtime  insulin lispro Injectable (ADMELOG) 4 Unit(s) SubCutaneous three times a day before meals  levothyroxine 100 MICROGram(s) Oral daily  metFORMIN 500 milliGRAM(s) Oral at bedtime    MEDICATIONS  (PRN):  benzocaine/menthol Lozenge 1 Lozenge Oral three times a day PRN Sore Throat  dextrose Oral Gel 15 Gram(s) Oral once PRN Blood Glucose LESS THAN 70 milliGRAM(s)/deciliter  OLANZapine 5 milliGRAM(s) Oral every 8 hours PRN agitation  OLANZapine Injectable 5 milliGRAM(s) IntraMuscular every 8 hours PRN agitation      Allergies    No Known Allergies    Intolerances        REVIEW OF SYSTEMS:  CONSTITUTIONAL: no changes  EYES: No eye pain, visual disturbances, or discharge  ENMT:  No difficulty hearing, No sinus or throat pain  NECK: No pain or stiffness  RESPIRATORY: No cough, wheezing, chills or hemoptysis; No shortness of breath  CARDIOVASCULAR: No chest pain, palpitations or leg swelling  GASTROINTESTINAL: No abdominal or epigastric pain. No nausea, vomiting, or hematemesis; No diarrhea or constipation. No melena or hematochezia.  GENITOURINARY: No dysuria, frequency, hematuria, or incontinence  NEUROLOGICAL: No headaches, memory loss, loss of strength, numbness, or tremors  SKIN: No itching, burning, rashes, or lesions   ENDOCRINE: No heat or cold intolerance; No hair loss  MUSCULOSKELETAL: No joint pain or swelling; No muscle, back, or extremity pain  PSYCHIATRIC: No depression, anxiety, mood swings, or difficulty sleeping  HEME/LYMPH: No easy bruising, or bleeding gums  ALLERY AND IMMUNOLOGIC: No hives or eczema    Vital Signs Last 24 Hrs  T(C): 36.7 (28 Feb 2023 11:05), Max: 36.7 (27 Feb 2023 23:22)  T(F): 98.1 (28 Feb 2023 11:05), Max: 98.1 (27 Feb 2023 23:22)  HR: 71 (28 Feb 2023 11:05) (71 - 77)  BP: 105/70 (28 Feb 2023 11:05) (101/60 - 106/66)  BP(mean): --  RR: 18 (28 Feb 2023 11:05) (17 - 18)  SpO2: 100% (28 Feb 2023 11:05) (100% - 100%)    Parameters below as of 28 Feb 2023 11:05  Patient On (Oxygen Delivery Method): room air        PHYSICAL EXAM:  GENERAL:   HEAD: Atraumatic, Normocephalic  EYES: PERRLA, conjunctiva and sclera clear  ENMT: No  exudates,; Moist mucous membranes,, No lesions  NECK: Supple, No JVD, Normal thyroid  NERVOUS SYSTEM:  Alert & Oriented,   CHEST/LUNG: Clear to auscultation bilaterally; No rales, rhonchi, wheezing, or rubs  HEART: Regular rate and rhythm; No murmurs, rubs, or gallops  ABDOMEN: Soft, Nontender, Nondistended; Bowel sounds present  EXTREMITIES:  2+ Peripheral Pulses, no edema  SKIN: No rashes or lesions    LABS:        CAPILLARY BLOOD GLUCOSE      POCT Blood Glucose.: 222 mg/dL (28 Feb 2023 11:53)  POCT Blood Glucose.: 315 mg/dL (28 Feb 2023 08:22)  POCT Blood Glucose.: 100 mg/dL (27 Feb 2023 21:47)    Lipid panel:           Thyroid:  Diabetes Tests:  Parathyroid Panel:  Adrenals:  RADIOLOGY & ADDITIONAL TESTS:    Imaging Personally Reviewed:  [ ] YES  [ ] NO    Consultant(s) Notes Reviewed:  [ ] YES  [ ] NO    Care Discussed with Consultants/Other Providers [ ] YES  [ ] NO

## 2023-02-28 NOTE — DISCHARGE NOTE PROVIDER - NSDCCPCAREPLAN_GEN_ALL_CORE_FT
PRINCIPAL DISCHARGE DIAGNOSIS  Diagnosis: Hypoglycemia  Assessment and Plan of Treatment: resolved. please continue basaglar 10units at bedtime, fiasp 4unit 3 times a day before meals, and metforming 500mg at bedtime. follow up with endocrinologist in 1-2 weeks. ensure proper meals and injection of correct insulin regimen.      SECONDARY DISCHARGE DIAGNOSES  Diagnosis: AMS (altered mental status)  Assessment and Plan of Treatment: resolved.    Diagnosis: Hypothyroidism  Assessment and Plan of Treatment: continue synthroid    Diagnosis: HIV disease  Assessment and Plan of Treatment: continue biktarvy

## 2023-02-28 NOTE — BH CONSULTATION LIAISON PROGRESS NOTE - NSBHASSESSMENTFT_PSY_ALL_CORE
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 Patient's family confirmed Patient does not have a formal past psychiatric history and never was formally diagnosed for "Bipolar." Main issue remains that Patient spends her days at home, smoking cigarettes and being on social media. She is not able to function independently, work and has no access to resources such as a day program or cognitive rehabilitation. Patient ideally needs a sort of day program/ cognitive rehabilitation as if she was a patient with a CVA/traumatic brain injury as the deficits are the same and the rehab need is the same as well. Neuropsychiatric testing would be able to shed light into the areas of impairment in Pt's cognition, however, these tests are difficult to get, very lengthy and likely Patient would not be able to tolerate it.

## 2023-03-01 NOTE — ED ADULT NURSE NOTE - GENITOURINARY ASSESSMENT
----- Message from Gris Vela 

## 2023-03-05 DIAGNOSIS — Z79.4 LONG TERM (CURRENT) USE OF INSULIN: ICD-10-CM

## 2023-03-05 DIAGNOSIS — F02.80 DEMENTIA IN OTHER DISEASES CLASSIFIED ELSEWHERE, UNSPECIFIED SEVERITY, WITHOUT BEHAVIORAL DISTURBANCE, PSYCHOTIC DISTURBANCE, MOOD DISTURBANCE, AND ANXIETY: ICD-10-CM

## 2023-03-05 DIAGNOSIS — F31.9 BIPOLAR DISORDER, UNSPECIFIED: ICD-10-CM

## 2023-03-05 DIAGNOSIS — F19.19 OTHER PSYCHOACTIVE SUBSTANCE ABUSE WITH UNSPECIFIED PSYCHOACTIVE SUBSTANCE-INDUCED DISORDER: ICD-10-CM

## 2023-03-05 DIAGNOSIS — Z79.899 OTHER LONG TERM (CURRENT) DRUG THERAPY: ICD-10-CM

## 2023-03-05 DIAGNOSIS — B20 HUMAN IMMUNODEFICIENCY VIRUS [HIV] DISEASE: ICD-10-CM

## 2023-03-05 DIAGNOSIS — Z20.822 CONTACT WITH AND (SUSPECTED) EXPOSURE TO COVID-19: ICD-10-CM

## 2023-03-05 DIAGNOSIS — Z28.310 UNVACCINATED FOR COVID-19: ICD-10-CM

## 2023-03-05 DIAGNOSIS — E03.9 HYPOTHYROIDISM, UNSPECIFIED: ICD-10-CM

## 2023-03-05 DIAGNOSIS — Z79.890 HORMONE REPLACEMENT THERAPY: ICD-10-CM

## 2023-03-05 DIAGNOSIS — E16.2 HYPOGLYCEMIA, UNSPECIFIED: ICD-10-CM

## 2023-03-05 DIAGNOSIS — E09.649 DRUG OR CHEMICAL INDUCED DIABETES MELLITUS WITH HYPOGLYCEMIA WITHOUT COMA: ICD-10-CM

## 2023-03-06 ENCOUNTER — EMERGENCY (EMERGENCY)
Facility: HOSPITAL | Age: 43
LOS: 0 days | Discharge: HOME | End: 2023-03-07
Attending: STUDENT IN AN ORGANIZED HEALTH CARE EDUCATION/TRAINING PROGRAM
Payer: MEDICAID

## 2023-03-06 VITALS
DIASTOLIC BLOOD PRESSURE: 75 MMHG | TEMPERATURE: 98 F | SYSTOLIC BLOOD PRESSURE: 129 MMHG | WEIGHT: 160.06 LBS | RESPIRATION RATE: 17 BRPM | HEART RATE: 104 BPM | HEIGHT: 62 IN | OXYGEN SATURATION: 100 %

## 2023-03-06 DIAGNOSIS — J02.9 ACUTE PHARYNGITIS, UNSPECIFIED: ICD-10-CM

## 2023-03-06 DIAGNOSIS — B20 HUMAN IMMUNODEFICIENCY VIRUS [HIV] DISEASE: ICD-10-CM

## 2023-03-06 DIAGNOSIS — E03.9 HYPOTHYROIDISM, UNSPECIFIED: ICD-10-CM

## 2023-03-06 DIAGNOSIS — Z79.4 LONG TERM (CURRENT) USE OF INSULIN: ICD-10-CM

## 2023-03-06 DIAGNOSIS — E11.649 TYPE 2 DIABETES MELLITUS WITH HYPOGLYCEMIA WITHOUT COMA: ICD-10-CM

## 2023-03-06 DIAGNOSIS — Z79.84 LONG TERM (CURRENT) USE OF ORAL HYPOGLYCEMIC DRUGS: ICD-10-CM

## 2023-03-06 LAB
GLUCOSE BLDC GLUCOMTR-MCNC: 145 MG/DL — HIGH (ref 70–99)
GLUCOSE BLDC GLUCOMTR-MCNC: 47 MG/DL — CRITICAL LOW (ref 70–99)

## 2023-03-06 PROCEDURE — 99284 EMERGENCY DEPT VISIT MOD MDM: CPT

## 2023-03-06 RX ORDER — AMOXICILLIN 250 MG/5ML
1 SUSPENSION, RECONSTITUTED, ORAL (ML) ORAL
Qty: 4 | Refills: 0
Start: 2023-03-06 | End: 2023-03-09

## 2023-03-06 RX ORDER — DEXAMETHASONE 0.5 MG/5ML
10 ELIXIR ORAL ONCE
Refills: 0 | Status: DISCONTINUED | OUTPATIENT
Start: 2023-03-06 | End: 2023-03-06

## 2023-03-06 RX ORDER — AZITHROMYCIN 500 MG/1
500 TABLET, FILM COATED ORAL ONCE
Refills: 0 | Status: COMPLETED | OUTPATIENT
Start: 2023-03-06 | End: 2023-03-06

## 2023-03-06 RX ADMIN — AZITHROMYCIN 500 MILLIGRAM(S): 500 TABLET, FILM COATED ORAL at 22:48

## 2023-03-06 NOTE — ED PROVIDER NOTE - PATIENT PORTAL LINK FT
You can access the FollowMyHealth Patient Portal offered by [facility] by registering at the following website: [email or web address]. By joining [employer]’s FollowMyHealth portal, you will also be able to view your health information using other applications (apps) compatible with our system.

## 2023-03-06 NOTE — ED PROVIDER NOTE - CLINICAL SUMMARY MEDICAL DECISION MAKING FREE TEXT BOX
Pt p/w with sore throat for 1-2 days. No history of immunocompromise. Non-toxic appearing and hemodynamically stable in ED. Patient is euvolemic w/ no trismus, no drooling, no muffled voice, no dysphagia, no odynophagia, no airway compromise, no neck mass, no swelling at submandibular area, no lip swelling, no tongue swelling, no cyanosis, no raised tongue, no "woody" or brawny texture to floor of mouth, no erythema to floor of mouth. Able to tolerate PO intake and secretions.   Considered DDX but not consistent with presentation: Jefry's angina, Streptococcal pharyngitis, Bacterial Tracheitis, infectious mononucleosis, Angioedema, retropharyngeal abscess, peritonsillar abscess, epiglottitis, penetrating traumatic injury, parotitits, acute necrotizing ulcerative gingivitis, caustic ingestion.  PLAN:  - conservative treatment, PCP followup and return precautions  - strep throat: azithromycin rx  - also found to be hypoglycemic, asymptomatic in the ED, FSG 41, given meal PO. Will recheck FSG and dc home .

## 2023-03-06 NOTE — ED ADULT NURSE REASSESSMENT NOTE - NS ED NURSE REASSESS COMMENT FT1
dr. alonzo notified of BG. pt given juice, crackers, and sandwich. rpt . as p[er dr. alonzo pt to be d/c. pt given metro card. alert and oriented x4. ambulatory independently with steady gait.

## 2023-03-06 NOTE — ED PROVIDER NOTE - OBJECTIVE STATEMENT
42F PMHx of PMH HIV+ most recent CD4 and VL not known (in 2/2021 CD4 252/14% and VL undetectable) maintained on Biktarvy, DM1 with cognitive impairment due to hypoglycemic episodes, DKA, back surgeries, hypothyroidism, ?bipolar d/o presenting with sore throat today. Describes mild sore throat. Denies any chest pain, abdominal pain, shortness of breath, nausea/vomiting, headaches, fevers, chills, diarrhea ,constipation, weakness, syncope, hematuria, dysuria, urinary symptoms, subjective neurological deficits.

## 2023-03-06 NOTE — ED PROVIDER NOTE - NSFOLLOWUPINSTRUCTIONS_ED_ALL_ED_FT
Pharyngitis    Pharyngitis is inflammation of your pharynx, which is typically caused by a viral or bacterial infection. Pharyngitis can be contagious and may spread from person to person through intimate contact, coughing, sneezing, or sharing personal items and utensils. Symptoms of pharyngitis may include sore throat, fever, headache, or swollen lymph nodes. If you are prescribed antibiotics, make sure you finish them even if you start to feel better. Gargle with salt water as often as every 1-2 hours to soothe your throat. Throat lozenges (if you are not at risk for choking) or sprays may be used to soothe your throat.    SEEK IMMEDIATE MEDICAL CARE IF YOU HAVE ANY OF THE FOLLOWING SYMPTOMS: neck stiffness, drooling, hoarseness or change in voice, inability to swallow liquids, vomiting, or trouble breathing.     Please fill the prescription of the antibiotics and take as directed. Please finish the entire course of the medication as prescribed. Do not use any alcohol or grapefruit juice with any antibiotics.

## 2023-03-06 NOTE — ED ADULT NURSE NOTE - CARDIO ASSESSMENT
ALLERGY SOLUTION RE-ORDER REQUEST    Abhinav Griffin 2004 MRN: [id number]    DATE NEEDED:  11/25/19  Vial Color/ Content   Top Dose   Last Dose Vial Size  Red 1:1 Grass, Trees   Red 1:1 0.5   Red 1:10.5 5mL  Red 1:1 Weeds   Red 1:1 0.5   Red 1:10.5 5mL  Red 1:1 Cat, Dog, Dust Mite   Red 1:1 0.5   Red 1:10.5 5mL    Red 1:1 Molds   Red 1:1 0.5   Red 1:10.5 5mL      Serum reorder consent signed and patient/parent was advised that new serums would be ordered through the pharmacy and billed to their insurance company when they arrive in clinic. Yes    Shot Clinic Location:  [place]  Ship to Location: [place]  Serum billed to:  [place]    Special Instructions:        Updated Prescription Needed: Yes      Requester Signature  Sandoval Huntley LPN  
Allergy serums received at [place].     Vials received below:    Vial Color Content                      Vial Size Expiration Date  Red 1:1 Molds 5 mL  11/18/2020  Red 1:1 Grass, Trees 5 mL  11/18/2020  Red 1:1 Cat, Dog, Dust Mite 5 mL  11/18/2020  Red 1:1 Weeds 5 mL  11/18/2020      Signature  Krystal Grey RN        
---

## 2023-03-13 RX ORDER — INSULIN GLARGINE 100 [IU]/ML
0 INJECTION, SOLUTION SUBCUTANEOUS
Qty: 0 | Refills: 0 | DISCHARGE

## 2023-03-13 RX ORDER — LEVOTHYROXINE SODIUM 125 MCG
1 TABLET ORAL
Qty: 0 | Refills: 0 | DISCHARGE

## 2023-03-13 RX ORDER — INSULIN ASPART 100 [IU]/ML
0 INJECTION, SOLUTION SUBCUTANEOUS
Qty: 0 | Refills: 0 | DISCHARGE

## 2023-03-13 RX ORDER — INSULIN ASPART 100 [IU]/ML
4 INJECTION, SOLUTION SUBCUTANEOUS
Qty: 0 | Refills: 0 | DISCHARGE

## 2023-04-27 ENCOUNTER — OUTPATIENT (OUTPATIENT)
Dept: OUTPATIENT SERVICES | Facility: HOSPITAL | Age: 43
LOS: 1 days | Discharge: HOME | End: 2023-04-27
Payer: COMMERCIAL

## 2023-04-27 ENCOUNTER — TRANSCRIPTION ENCOUNTER (OUTPATIENT)
Age: 43
End: 2023-04-27

## 2023-04-27 PROBLEM — E11.9 TYPE 2 DIABETES MELLITUS WITHOUT COMPLICATIONS: Chronic | Status: ACTIVE | Noted: 2023-02-24

## 2023-04-27 PROBLEM — E03.9 HYPOTHYROIDISM, UNSPECIFIED: Chronic | Status: ACTIVE | Noted: 2023-02-24

## 2023-04-27 PROBLEM — B20 HUMAN IMMUNODEFICIENCY VIRUS [HIV] DISEASE: Chronic | Status: ACTIVE | Noted: 2023-02-24

## 2023-04-27 PROCEDURE — 90839 PSYTX CRISIS INITIAL 60 MIN: CPT

## 2023-05-03 DIAGNOSIS — F06.30 MOOD DISORDER DUE TO KNOWN PHYSIOLOGICAL CONDITION, UNSPECIFIED: ICD-10-CM

## 2023-07-26 NOTE — ED ADULT NURSE NOTE - CHPI ED NUR TIMING2
Airway    Date/Time: 7/26/2023 8:09 AM    Performed by: Corey Hamm M.D.  Authorized by: Corey Hamm M.D.    Location:  OR  Urgency:  Elective  Indications for Airway Management:  Anesthesia      Spontaneous Ventilation: absent    Sedation Level:  Deep  Preoxygenated: Yes    Patient Position:  Sniffing  Final Airway Type:  Endotracheal airway  Final Endotracheal Airway:  ETT  Cuffed: Yes    Technique Used for Successful ETT Placement:  Direct laryngoscopy    Insertion Site:  Oral  Blade Type:  Betzy  Laryngoscope Blade/Videolaryngoscope Blade Size:  3  ETT Size (mm):  7.0  Measured from:  Teeth  ETT to Teeth (cm):  22  Placement Verified by: auscultation and capnometry    Cormack-Lehane Classification:  Grade I - full view of glottis  Number of Attempts at Approach:  1        
Peripheral Block    Date/Time: 7/26/2023 11:07 AM    Performed by: Corey Hamm M.D.  Authorized by: Corey Hamm M.D.    Patient Location:  OR  Start Time:  7/26/2023 11:07 AM  End Time:  7/26/2023 11:09 AM  Reason for Block: at surgeon's request and post-op pain management ONLY    patient identified, IV checked, site marked, risks and benefits discussed, surgical consent, monitors and equipment checked, pre-op evaluation and timeout performed    Patient Position:  Supine  Prep: ChloraPrep    Monitoring:  Heart rate, continuous pulse ox and cardiac monitor  Block Region:  Trunk  Trunk - Block Type:  Abdominal plane block - TAP block    Laterality:  Bilateral  Procedures: ultrasound guided  Image captured, interpreted and electronically stored.  Local Infiltration:  Lidocaine  Strength:  1 %  Dose:  3 ml  Block Type:  Single-shot  Needle Length:  100mm  Needle Gauge:  21 G  Needle Localization:  Ultrasound guidance  Injection Assessment:  Negative aspiration for heme, no paresthesia on injection, incremental injection and local visualized surrounding nerve on ultrasound  Evidence of intravascular injection: No     US Guided Transversus Abdominis Plane (TAP) Block   US probe placed at the anterior axillary line between the costal margin and iliac crest in an axial plane. External Oblique, Internal Oblique (IO) and Transversis Abdominis (TA) muscles identified.  Needle inserted anteromedial to probe in an in plane approach and advanced under direct visualization to TAP between IO and TA muscled.  After negative aspiration LA injected with ease and visualized spreading in TAP plane.     25ml injected on either side.       
sudden onset

## 2023-08-01 ENCOUNTER — EMERGENCY (EMERGENCY)
Facility: HOSPITAL | Age: 43
LOS: 0 days | Discharge: HOME | End: 2023-08-02
Attending: EMERGENCY MEDICINE
Payer: MEDICAID

## 2023-08-01 VITALS
DIASTOLIC BLOOD PRESSURE: 67 MMHG | HEART RATE: 76 BPM | RESPIRATION RATE: 18 BRPM | HEIGHT: 64 IN | OXYGEN SATURATION: 95 % | SYSTOLIC BLOOD PRESSURE: 116 MMHG | WEIGHT: 130.95 LBS

## 2023-08-01 DIAGNOSIS — E03.9 HYPOTHYROIDISM, UNSPECIFIED: ICD-10-CM

## 2023-08-01 DIAGNOSIS — Z79.4 LONG TERM (CURRENT) USE OF INSULIN: ICD-10-CM

## 2023-08-01 DIAGNOSIS — E10.649 TYPE 1 DIABETES MELLITUS WITH HYPOGLYCEMIA WITHOUT COMA: ICD-10-CM

## 2023-08-01 DIAGNOSIS — Z79.84 LONG TERM (CURRENT) USE OF ORAL HYPOGLYCEMIC DRUGS: ICD-10-CM

## 2023-08-01 DIAGNOSIS — F31.9 BIPOLAR DISORDER, UNSPECIFIED: ICD-10-CM

## 2023-08-01 DIAGNOSIS — B20 HUMAN IMMUNODEFICIENCY VIRUS [HIV] DISEASE: ICD-10-CM

## 2023-08-01 PROCEDURE — 99284 EMERGENCY DEPT VISIT MOD MDM: CPT

## 2023-08-01 NOTE — ED PROVIDER NOTE - PATIENT PORTAL LINK FT
You can access the FollowMyHealth Patient Portal offered by [facility] by registering at the following website: [email or web address]. By joining [employer]’s FollowMyHealth portal, you will also be able to view your health information using other applications (apps) compatible with our system.

## 2023-08-01 NOTE — ED PROVIDER NOTE - CLINICAL SUMMARY MEDICAL DECISION MAKING FREE TEXT BOX
41 yo F with hypoglycemia, questionable compliance with insulin/sugar checks, also forgets to eat after administering insulin in past, pt. denies inciting event, doubt uti   -basic labs, etoh, drug screen, ua, cx, hcg, finger stick q hr until stable   -f/u results, reeval

## 2023-08-01 NOTE — ED PROVIDER NOTE - OBJECTIVE STATEMENT
41 yo F with hypoglycemia at home, measured at 40 by EMS  ROS: negative for fever, cough, headache, chest pain, shortness of breath, abd pain, nausea, vomiting, diarrhea, rash, paresthesia, and weakness--all other systems reviewed are negative.  Dextrose given with immediate improvement.  Pt. currently has no complaints.  She denies inciting event.  Chart review shows questionable compliance history and prior admission for hypoglycemia.    PMH: HIV+ most recent CD4 and VL not known (in 2/2021 CD4 252/14% and VL undetectable) maintained on Biktarvy, DM1 with cognitive impairment due to hypoglycemic episodes, DKA, back surgeries, hypothyroidism, bipolar d/o; Meds: See EMR for list; SH: Denies smoking/drinking/drug use

## 2023-08-01 NOTE — ED PROVIDER NOTE - PROGRESS NOTE DETAILS
Results reported to patient--FS normalized, pt. refused labs  Pt. reports feeling better after ER stay, no complaints at this time   pt. agrees to f/u with primary care outpt., endo referral given for urgent f/u of blood sugar   pt. understands to return to ED if symptoms worsen; will d/c to family without change to meds--family encouraged to watch patient take insulin regularly to ensure proper dosing and feeding after insulin, pt. has known limited capacity for self-care, family has been made aware of this already in past spoke to mother via telephone and informed her of results thus far--normalization of glucose and needs for further f/u at endocrine  she verbalizes understanding and states her daughter has been infrequently keeping her from watching her self administer insulin--Pt. wants autonomy, but mom understands her dosages need to be confirmed because over medication of insulin can kill her .  mom agrees to be more careful in monitoring her medications  mom is unable to come at night to pick her up, but agrees to come in AM

## 2023-08-01 NOTE — ED PROVIDER NOTE - NSFOLLOWUPCLINICS_GEN_ALL_ED_FT
[facility]  Endocrinology  [address] [place] [address]  Phone: [phone]  Fax:   Follow Up Time: Urgent

## 2023-08-01 NOTE — ED ADULT TRIAGE NOTE - CHIEF COMPLAINT QUOTE
BIBA,  pt found altered at home.  (PMH-DM) FS 41 per EMS, pt rcvd glucagon IM, 12.5gms dextrose given by EMS.  pt denies taking insulin today.  per mother pt is non-compliant with insulin, sometimes she forgets to eat also.  pt aaox4 in triage.

## 2023-08-01 NOTE — ED ADULT NURSE NOTE - NSICDXPASTMEDICALHX_GEN_ALL_CORE_FT
PAST MEDICAL HISTORY:  Adult hypothyroidism     Diabetes mellitus     Diabetes type I     DM type 2, not at goal     HIV (human immunodeficiency virus infection)     HIV disease

## 2023-08-01 NOTE — ED ADULT NURSE NOTE - OBJECTIVE STATEMENT
42 year old female presents to ED, BIBA, per EMS pt was found altered at home.  Patient is diabetic, per EMS FS was 41 and received glucagon IM, and 12.5g dextrose. Patient is uncooperative and poor historian at this time. When asked name patient appeared to be annoyed and said "WHAT?" and immediately went back under the blankets. [place] and juice offered, patient denied and exclaimed "I'M NOT HUNGRY!". Respirations equal and unlabored. No acute distress noted at this time. Dr Hannah at bedside, patient also refusing to speak to him.

## 2023-08-01 NOTE — ED ADULT NURSE REASSESSMENT NOTE - NS ED NURSE REASSESS COMMENT FT1
attempted to insert IV and collect labs, IV line no good, pt refused any other attempts, stated "you ain't poking me again"   made aware

## 2023-08-02 VITALS
RESPIRATION RATE: 18 BRPM | HEART RATE: 62 BPM | DIASTOLIC BLOOD PRESSURE: 80 MMHG | OXYGEN SATURATION: 98 % | SYSTOLIC BLOOD PRESSURE: 114 MMHG

## 2023-08-02 RX ORDER — INSULIN LISPRO 100/ML
5 VIAL (ML) SUBCUTANEOUS ONCE
Refills: 0 | Status: COMPLETED | OUTPATIENT
Start: 2023-08-02 | End: 2023-08-02

## 2023-08-02 RX ORDER — BICTEGRAVIR SODIUM, EMTRICITABINE, AND TENOFOVIR ALAFENAMIDE FUMARATE 30; 120; 15 MG/1; MG/1; MG/1
1 TABLET ORAL ONCE
Refills: 0 | Status: COMPLETED | OUTPATIENT
Start: 2023-08-02 | End: 2023-08-02

## 2023-08-02 RX ADMIN — BICTEGRAVIR SODIUM, EMTRICITABINE, AND TENOFOVIR ALAFENAMIDE FUMARATE 1 TABLET(S): 30; 120; 15 TABLET ORAL at 08:40

## 2023-08-02 RX ADMIN — Medication 5 UNIT(S): at 08:40

## 2023-08-02 NOTE — ED ADULT NURSE REASSESSMENT NOTE - NS ED NURSE REASSESS COMMENT FT1
Patient is standing up walking around and much more alert at this time. Patient ate sandwich and drank juice. Respirations equal and unlabored. No acute distress noted at this time.

## 2023-08-02 NOTE — ED ADULT NURSE REASSESSMENT NOTE - NS ED NURSE REASSESS COMMENT FT1
received report from Jennifer PALACIOS. patient currently refusing AM vitals. patient restless and standing outside patient room. unable to be redirected via therapeutic communication. patient discharged. plan for social work to consult for patient to return home.

## 2023-08-02 NOTE — ED ADULT NURSE REASSESSMENT NOTE - NS ED NURSE REASSESS COMMENT FT1
patient alert and oriented. patient ambulates w/ steady gait. pt walking around unit yelling and requesting diabetes home medications. IV removed per patient. patient evaluated by dr. ruiz and diabetes meds ordered. upon administering medications, patient refused meds. HARISH Craig and Dr. Ruiz aware. spoke w/ mom, Shreya about patient return home. states she will be home all day to receive patient. home address confirmed by mother. ambulette set up for patient discharge.

## 2023-08-05 ENCOUNTER — EMERGENCY (EMERGENCY)
Facility: HOSPITAL | Age: 43
LOS: 0 days | Discharge: HOME | End: 2023-08-05
Attending: STUDENT IN AN ORGANIZED HEALTH CARE EDUCATION/TRAINING PROGRAM
Payer: COMMERCIAL

## 2023-08-05 VITALS
SYSTOLIC BLOOD PRESSURE: 115 MMHG | OXYGEN SATURATION: 98 % | RESPIRATION RATE: 18 BRPM | DIASTOLIC BLOOD PRESSURE: 70 MMHG | HEART RATE: 77 BPM

## 2023-08-05 VITALS
WEIGHT: 100.09 LBS | TEMPERATURE: 99 F | SYSTOLIC BLOOD PRESSURE: 93 MMHG | HEIGHT: 64 IN | RESPIRATION RATE: 19 BRPM | OXYGEN SATURATION: 97 % | DIASTOLIC BLOOD PRESSURE: 60 MMHG | HEART RATE: 97 BPM

## 2023-08-05 DIAGNOSIS — Z21 ASYMPTOMATIC HUMAN IMMUNODEFICIENCY VIRUS [HIV] INFECTION STATUS: ICD-10-CM

## 2023-08-05 DIAGNOSIS — Z91.148 PATIENT'S OTHER NONCOMPLIANCE WITH MEDICATION REGIMEN FOR OTHER REASON: ICD-10-CM

## 2023-08-05 DIAGNOSIS — T38.3X6A UNDERDOSING OF INSULIN AND ORAL HYPOGLYCEMIC [ANTIDIABETIC] DRUGS, INITIAL ENCOUNTER: ICD-10-CM

## 2023-08-05 DIAGNOSIS — D64.9 ANEMIA, UNSPECIFIED: ICD-10-CM

## 2023-08-05 DIAGNOSIS — Z79.84 LONG TERM (CURRENT) USE OF ORAL HYPOGLYCEMIC DRUGS: ICD-10-CM

## 2023-08-05 DIAGNOSIS — Z91.048 OTHER NONMEDICINAL SUBSTANCE ALLERGY STATUS: ICD-10-CM

## 2023-08-05 DIAGNOSIS — E11.65 TYPE 2 DIABETES MELLITUS WITH HYPERGLYCEMIA: ICD-10-CM

## 2023-08-05 DIAGNOSIS — R73.9 HYPERGLYCEMIA, UNSPECIFIED: ICD-10-CM

## 2023-08-05 DIAGNOSIS — E03.9 HYPOTHYROIDISM, UNSPECIFIED: ICD-10-CM

## 2023-08-05 DIAGNOSIS — Z79.4 LONG TERM (CURRENT) USE OF INSULIN: ICD-10-CM

## 2023-08-05 LAB
ACETONE SERPL-MCNC: NEGATIVE — SIGNIFICANT CHANGE UP
ALBUMIN SERPL ELPH-MCNC: 4 G/DL — SIGNIFICANT CHANGE UP (ref 3.3–5)
ALP SERPL-CCNC: 84 U/L — SIGNIFICANT CHANGE UP (ref 40–120)
ALT FLD-CCNC: 54 U/L — SIGNIFICANT CHANGE UP (ref 12–78)
ANION GAP SERPL CALC-SCNC: 2 MMOL/L — LOW (ref 5–17)
ANISOCYTOSIS BLD QL: SIGNIFICANT CHANGE UP
AST SERPL-CCNC: 63 U/L — HIGH (ref 15–37)
BASOPHILS # BLD AUTO: 0.23 K/UL — HIGH (ref 0–0.2)
BASOPHILS NFR BLD AUTO: 2.5 % — HIGH (ref 0–2)
BILIRUB SERPL-MCNC: 0.4 MG/DL — SIGNIFICANT CHANGE UP (ref 0.2–1.2)
BUN SERPL-MCNC: 17 MG/DL — SIGNIFICANT CHANGE UP (ref 7–23)
CALCIUM SERPL-MCNC: 9.3 MG/DL — SIGNIFICANT CHANGE UP (ref 8.5–10.1)
CHLORIDE SERPL-SCNC: 105 MMOL/L — SIGNIFICANT CHANGE UP (ref 96–108)
CO2 SERPL-SCNC: 28 MMOL/L — SIGNIFICANT CHANGE UP (ref 22–31)
CREAT SERPL-MCNC: 1.06 MG/DL — SIGNIFICANT CHANGE UP (ref 0.5–1.3)
EGFR: 68 ML/MIN/1.73M2 — SIGNIFICANT CHANGE UP
EOSINOPHIL # BLD AUTO: 0.11 K/UL — SIGNIFICANT CHANGE UP (ref 0–0.5)
EOSINOPHIL NFR BLD AUTO: 1.2 % — SIGNIFICANT CHANGE UP (ref 0–6)
GLUCOSE SERPL-MCNC: 406 MG/DL — HIGH (ref 70–99)
HCT VFR BLD CALC: 29.3 % — LOW (ref 34.5–45)
HGB BLD-MCNC: 7.2 G/DL — LOW (ref 11.5–15.5)
HYPOCHROMIA BLD QL: SIGNIFICANT CHANGE UP
IMM GRANULOCYTES NFR BLD AUTO: 0.4 % — SIGNIFICANT CHANGE UP (ref 0–0.9)
LACTATE SERPL-SCNC: 1 MMOL/L — SIGNIFICANT CHANGE UP (ref 0.7–2)
LYMPHOCYTES # BLD AUTO: 1.74 K/UL — SIGNIFICANT CHANGE UP (ref 1–3.3)
LYMPHOCYTES # BLD AUTO: 18.6 % — SIGNIFICANT CHANGE UP (ref 13–44)
MANUAL SMEAR VERIFICATION: SIGNIFICANT CHANGE UP
MCHC RBC-ENTMCNC: 13.6 PG — LOW (ref 27–34)
MCHC RBC-ENTMCNC: 24.6 G/DL — LOW (ref 32–36)
MCV RBC AUTO: 55.3 FL — LOW (ref 80–100)
MICROCYTES BLD QL: SIGNIFICANT CHANGE UP
MONOCYTES # BLD AUTO: 0.69 K/UL — SIGNIFICANT CHANGE UP (ref 0–0.9)
MONOCYTES NFR BLD AUTO: 7.4 % — SIGNIFICANT CHANGE UP (ref 2–14)
NEUTROPHILS # BLD AUTO: 6.54 K/UL — SIGNIFICANT CHANGE UP (ref 1.8–7.4)
NEUTROPHILS NFR BLD AUTO: 69.9 % — SIGNIFICANT CHANGE UP (ref 43–77)
NRBC # BLD: 0 /100 WBCS — SIGNIFICANT CHANGE UP (ref 0–0)
OVALOCYTES BLD QL SMEAR: SLIGHT — SIGNIFICANT CHANGE UP
PLAT MORPH BLD: NORMAL — SIGNIFICANT CHANGE UP
PLATELET # BLD AUTO: 338 K/UL — SIGNIFICANT CHANGE UP (ref 150–400)
POIKILOCYTOSIS BLD QL AUTO: SIGNIFICANT CHANGE UP
POLYCHROMASIA BLD QL SMEAR: SLIGHT — SIGNIFICANT CHANGE UP
POTASSIUM SERPL-MCNC: 4.9 MMOL/L — SIGNIFICANT CHANGE UP (ref 3.5–5.3)
POTASSIUM SERPL-SCNC: 4.9 MMOL/L — SIGNIFICANT CHANGE UP (ref 3.5–5.3)
PROT SERPL-MCNC: 8.8 GM/DL — HIGH (ref 6–8.3)
RBC # BLD: 5.3 M/UL — HIGH (ref 3.8–5.2)
RBC # FLD: 27 % — HIGH (ref 10.3–14.5)
RBC BLD AUTO: ABNORMAL
SODIUM SERPL-SCNC: 135 MMOL/L — SIGNIFICANT CHANGE UP (ref 135–145)
TARGETS BLD QL SMEAR: SLIGHT — SIGNIFICANT CHANGE UP
WBC # BLD: 9.35 K/UL — SIGNIFICANT CHANGE UP (ref 3.8–10.5)
WBC # FLD AUTO: 9.35 K/UL — SIGNIFICANT CHANGE UP (ref 3.8–10.5)

## 2023-08-05 PROCEDURE — 99284 EMERGENCY DEPT VISIT MOD MDM: CPT

## 2023-08-05 RX ORDER — INSULIN GLARGINE 100 [IU]/ML
10 INJECTION, SOLUTION SUBCUTANEOUS
Qty: 0 | Refills: 0 | DISCHARGE

## 2023-08-05 RX ORDER — SODIUM CHLORIDE 9 MG/ML
1000 INJECTION INTRAMUSCULAR; INTRAVENOUS; SUBCUTANEOUS ONCE
Refills: 0 | Status: COMPLETED | OUTPATIENT
Start: 2023-08-05 | End: 2023-08-05

## 2023-08-05 RX ORDER — INSULIN HUMAN 100 [IU]/ML
6 INJECTION, SOLUTION SUBCUTANEOUS ONCE
Refills: 0 | Status: COMPLETED | OUTPATIENT
Start: 2023-08-05 | End: 2023-08-05

## 2023-08-05 RX ORDER — BICTEGRAVIR SODIUM, EMTRICITABINE, AND TENOFOVIR ALAFENAMIDE FUMARATE 30; 120; 15 MG/1; MG/1; MG/1
1 TABLET ORAL
Qty: 0 | Refills: 0 | DISCHARGE

## 2023-08-05 RX ADMIN — INSULIN HUMAN 6 UNIT(S): 100 INJECTION, SOLUTION SUBCUTANEOUS at 10:57

## 2023-08-05 RX ADMIN — SODIUM CHLORIDE 1000 MILLILITER(S): 9 INJECTION INTRAMUSCULAR; INTRAVENOUS; SUBCUTANEOUS at 10:55

## 2023-08-05 NOTE — ED ADULT NURSE REASSESSMENT NOTE - NS ED NURSE REASSESS COMMENT FT1
Patient continues to refuse EKG despite teaching "there is nothing wrong with my heart". MD notified.

## 2023-08-05 NOTE — ED ADULT NURSE REASSESSMENT NOTE - NS ED NURSE REASSESS COMMENT FT1
MD spoke to sister, Rere. Rere states patient has chronic anemia and will restart her on iron supplements. Sister reports Insulin being delivered to home today. Social work called, patient refused need for . Sister is at home and is patient's care giver, states she is ready to receive patient upon discharge.

## 2023-08-05 NOTE — ED ADULT NURSE REASSESSMENT NOTE - NS ED NURSE REASSESS COMMENT FT1
16:30:  Patient states "I'm going out for a cigarette, despite teaching". Security called and escorted patient out to smoke, and instructed to return to spot in ED.   16:45: RN walked out to waiting room and security alerted RN that patient left with [employer] ambulate. [employer] called and confirmed to  patient. 17:30; [employer] returned to ED for face sheet and to confirm patient was in ambulate safely.

## 2023-08-05 NOTE — ED PROVIDER NOTE - CLINICAL SUMMARY MEDICAL DECISION MAKING FREE TEXT BOX
pt w h/o dm presented today for hyperglycemia, states that she did not get her insulin delivered today, pt loud and demanding to get insulin, ?underlying psychiatric disorder, exam benign, pt given IVF and iv insulin, on labs pt found on labs to be anemia, pt refused to answer questions about her anemia, pt's sister arianne called, she verified pt has anemia, is currently supposed to be taking iron pills, she states that she will make sure she takes it, pt is asymptomatic, pt's sister also states that her insulin was delievered to the house once she left to the ER, pts elevated bs treated, discharged home

## 2023-08-05 NOTE — ED ADULT NURSE NOTE - OBJECTIVE STATEMENT
Patient presents with hyperglycemia. Patient screaming "GIVE ME THE INSULIN NOW". Patient AO X4. IV placed, MD at bedside. Patient denies SI/HI.

## 2023-08-05 NOTE — ED ADULT NURSE NOTE - NSFALLUNIVINTERV_ED_ALL_ED
Bed/Stretcher in lowest position, wheels locked, appropriate side rails in place/Call bell, personal items and telephone in reach/Instruct patient to call for assistance before getting out of bed/chair/stretcher/Non-slip footwear applied when patient is off stretcher/[place] to call system/Physically safe environment - no spills, clutter or unnecessary equipment/Purposeful proactive rounding/Room/bathroom lighting operational, light cord in reach

## 2023-08-05 NOTE — ED ADULT TRIAGE NOTE - CHIEF COMPLAINT QUOTE
patient BIBA as per EMS patient glucose is 465 , patient  refuses to talk with nurse at the time of triage, patient refuses FS at the time of triage

## 2023-08-05 NOTE — ED PROVIDER NOTE - PATIENT PORTAL LINK FT
You can access the FollowMyHealth Patient Portal offered by [facility] by registering at the following website: [email or web address]. By joining [employer]’s FollowMyHealth portal, you will also be able to view your health information using other applications (apps) compatible with our system.

## 2023-08-05 NOTE — ED ADULT TRIAGE NOTE - SPO2 (%)
Progress Note - Pulmonary   Dinah Hazard 70 y o  female MRN: [id number]  Unit/Bed#: [address] [place] [address] Encounter: [id number]    Assessment:  1  Stage IV malignancy of unclear origin, favor lung primary  2  Hematemesis  3  Suspected malignant pleural effusion  Plan:  · Patient appears to have decided to proceed with biopsy  I will coordinate for her to have either a navigational bronchoscopy or an IR guided biopsy, likely of a bone lesion (d/w Dr Shantelle Ocampo)  · Will follow up on timing after patient undergoes EGD for hematemesis; if stable afterward will coordinate ASAP  · Recommend brain MRI w/wo contrast, order placed    Chief Complaint:   "I guess we've decided "    Subjective:   No pulmonary complaints  She knows she is going for a procedure today but isn't sure what procedure  Reports that after taking pills last night she produced some dark red fluid but cannot say if she coughed it up or vomited it up  Appears generally confused  Objective:     Vitals: Blood pressure 95/58, pulse 92, temperature (!) 97 3 °F (36 3 °C), resp  rate 18, height 5' 5" (1 651 m), weight 45 kg (99 lb 3 3 oz), SpO2 94 %  ,Body mass index is 16 51 kg/m²  Intake/Output Summary (Last 24 hours) at 10/24/2022 1043  Last data filed at 10/24/2022 0548  Gross per 24 hour   Intake 470 ml   Output 700 ml   Net -230 ml       Invasive Devices  Report    Peripheral Intravenous Line  Duration           Long-Dwell Peripheral IV (Midline) 11/61/09 Right Basilic 2 days    Peripheral IV 10/23/22 Dorsal (posterior); Left Hand 1 day          Drain  Duration           External Urinary Catheter 3 days                Physical Exam:   General:  No acute distress  Alert and oriented x 3  Seems mildly off, unclear if this is due to anxiety/lack of comprehension of her current condition, or potentially more related to intracranial metastases  HEENT:  PERRL   MMM  Chest:  Clear to auscultation bilaterally    Cardiovascular:  S1 + S2, RRR, no M/R/G  Abdomen:  Soft, nontender, nondistended, no palpable masses or organomegaly  Extremities:  No significant edema  Neuro:  No focal deficits, grossly intact  Psych:  Normal mood and affect  Labs: I have personally reviewed pertinent lab results    Imaging and other studies: I have personally reviewed pertinent films in PACS 97

## 2023-08-05 NOTE — ED PROVIDER NOTE - OBJECTIVE STATEMENT
42 year old female with h/o DM, HIV and hypothyroidism 42 year old female with h/o DM, HIV and hypothyroidism presents today stating that she needs insulin, pt is supposed to have insulin delivered but has not had her medications in two days, she does not know why, pt comes in talking very loud, stating "you are taking too long! I need my insulin" as the nurse is attempting to place an IV, at times pt is selective, pt will not answer when asked a direct question, once an iv an insulin given, pt speaking more (-) chest pain (-) sob (-) nausea or vomiting (-) fevers or chills

## 2023-08-05 NOTE — ED PROVIDER NOTE - NSICDXPASTMEDICALHX_GEN_ALL_CORE_FT
Patient with abdominal pain starting this afternoon after eating broccoli. Partial blockages in the past \"I believe this is a complete blockage\".   No stool from ileostomy for 1 hour PAST MEDICAL HISTORY:  Adult hypothyroidism     Diabetes mellitus     Diabetes type I     DM type 2, not at goal     HIV (human immunodeficiency virus infection)     HIV disease      no

## 2023-08-05 NOTE — ED PROVIDER NOTE - PROGRESS NOTE DETAILS
pt updated on her labs, pt asked about her hgb which is 7.2, pt stares off, does not respond despite me repeating the question multiple times, pt was just eating and watching television, asking the nurse inquiring about her results, however pt does not respond when given her results and asking more info about her anemia today

## 2023-08-10 NOTE — DISCHARGE NOTE PROVIDER - NSDCHHENCOUNTER_GEN_ALL_CORE
SW/CM Discharge Plan  Informed patient is ready for discharge.  Patient to be picked up by  [facility], which has been placed on will call back status. Patient/interested person has been counseled for post hospitalization care.  Patient agrees and understands goals and plan. Initial implementation of the patient’s discharge plan has been arranged, including any devices/equipment needed for discharge. Discharge plan communicated to MD, RN and patient.    Patient’s discharge destination is Other (Comment) (Transfer to [facility] for wound care.).    Selected Continued Care - Admitted Since 8/2/2023    No services have been selected for the patient.          17-Feb-2021

## 2023-09-22 NOTE — ED PROVIDER NOTE - NSTIMEPROVIDERCAREINITIATE_GEN_ER
28-Oct-2021 15:58 General Sunscreen Counseling: I recommended a broad spectrum sunscreen with a SPF of 30 or higher. I explained that SPF 30 sunscreens block approximately 97 percent of the sun's harmful rays. Sunscreens should be applied at least 15 minutes prior to expected sun exposure and then every 2 hours after that as long as sun exposure continues. If swimming or exercising sunscreen should be reapplied every 45 minutes to an hour after getting wet or sweating. One ounce, or the equivalent of a shot glass full of sunscreen, is adequate to protect the skin not covered by a bathing suit. I also recommended a lip balm with a sunscreen as well. Sun protective clothing can be used in lieu of sunscreen but must be worn the entire time you are exposed to the sun. Detail Level: Zone

## 2023-12-17 ENCOUNTER — EMERGENCY (EMERGENCY)
Facility: HOSPITAL | Age: 43
LOS: 0 days | Discharge: HOME | End: 2023-12-17
Attending: STUDENT IN AN ORGANIZED HEALTH CARE EDUCATION/TRAINING PROGRAM
Payer: MEDICAID

## 2023-12-17 VITALS
DIASTOLIC BLOOD PRESSURE: 78 MMHG | RESPIRATION RATE: 12 BRPM | OXYGEN SATURATION: 99 % | HEART RATE: 59 BPM | SYSTOLIC BLOOD PRESSURE: 105 MMHG

## 2023-12-17 VITALS
SYSTOLIC BLOOD PRESSURE: 115 MMHG | DIASTOLIC BLOOD PRESSURE: 78 MMHG | OXYGEN SATURATION: 99 % | TEMPERATURE: 97 F | RESPIRATION RATE: 18 BRPM | HEART RATE: 67 BPM

## 2023-12-17 DIAGNOSIS — Z79.4 LONG TERM (CURRENT) USE OF INSULIN: ICD-10-CM

## 2023-12-17 DIAGNOSIS — B20 HUMAN IMMUNODEFICIENCY VIRUS [HIV] DISEASE: ICD-10-CM

## 2023-12-17 DIAGNOSIS — E11.649 TYPE 2 DIABETES MELLITUS WITH HYPOGLYCEMIA WITHOUT COMA: ICD-10-CM

## 2023-12-17 DIAGNOSIS — Z79.84 LONG TERM (CURRENT) USE OF ORAL HYPOGLYCEMIC DRUGS: ICD-10-CM

## 2023-12-17 DIAGNOSIS — Z91.09 OTHER ALLERGY STATUS, OTHER THAN TO DRUGS AND BIOLOGICAL SUBSTANCES: ICD-10-CM

## 2023-12-17 LAB
ALBUMIN SERPL ELPH-MCNC: 3 G/DL — LOW (ref 3.3–5)
ALBUMIN SERPL ELPH-MCNC: 3 G/DL — LOW (ref 3.3–5)
ALP SERPL-CCNC: 72 U/L — SIGNIFICANT CHANGE UP (ref 40–120)
ALP SERPL-CCNC: 72 U/L — SIGNIFICANT CHANGE UP (ref 40–120)
ALT FLD-CCNC: 62 U/L — SIGNIFICANT CHANGE UP (ref 12–78)
ALT FLD-CCNC: 62 U/L — SIGNIFICANT CHANGE UP (ref 12–78)
ANION GAP SERPL CALC-SCNC: 6 MMOL/L — SIGNIFICANT CHANGE UP (ref 5–17)
ANION GAP SERPL CALC-SCNC: 6 MMOL/L — SIGNIFICANT CHANGE UP (ref 5–17)
AST SERPL-CCNC: 53 U/L — HIGH (ref 15–37)
AST SERPL-CCNC: 53 U/L — HIGH (ref 15–37)
BILIRUB SERPL-MCNC: 0.3 MG/DL — SIGNIFICANT CHANGE UP (ref 0.2–1.2)
BILIRUB SERPL-MCNC: 0.3 MG/DL — SIGNIFICANT CHANGE UP (ref 0.2–1.2)
BUN SERPL-MCNC: 14 MG/DL — SIGNIFICANT CHANGE UP (ref 7–23)
BUN SERPL-MCNC: 14 MG/DL — SIGNIFICANT CHANGE UP (ref 7–23)
CALCIUM SERPL-MCNC: 8.9 MG/DL — SIGNIFICANT CHANGE UP (ref 8.5–10.1)
CALCIUM SERPL-MCNC: 8.9 MG/DL — SIGNIFICANT CHANGE UP (ref 8.5–10.1)
CHLORIDE SERPL-SCNC: 104 MMOL/L — SIGNIFICANT CHANGE UP (ref 96–108)
CHLORIDE SERPL-SCNC: 104 MMOL/L — SIGNIFICANT CHANGE UP (ref 96–108)
CO2 SERPL-SCNC: 23 MMOL/L — SIGNIFICANT CHANGE UP (ref 22–31)
CO2 SERPL-SCNC: 23 MMOL/L — SIGNIFICANT CHANGE UP (ref 22–31)
CREAT SERPL-MCNC: 1.12 MG/DL — SIGNIFICANT CHANGE UP (ref 0.5–1.3)
CREAT SERPL-MCNC: 1.12 MG/DL — SIGNIFICANT CHANGE UP (ref 0.5–1.3)
EGFR: 63 ML/MIN/1.73M2 — SIGNIFICANT CHANGE UP
EGFR: 63 ML/MIN/1.73M2 — SIGNIFICANT CHANGE UP
GLUCOSE SERPL-MCNC: 281 MG/DL — HIGH (ref 70–99)
GLUCOSE SERPL-MCNC: 281 MG/DL — HIGH (ref 70–99)
POTASSIUM SERPL-MCNC: 4.7 MMOL/L — SIGNIFICANT CHANGE UP (ref 3.5–5.3)
POTASSIUM SERPL-MCNC: 4.7 MMOL/L — SIGNIFICANT CHANGE UP (ref 3.5–5.3)
POTASSIUM SERPL-SCNC: 4.7 MMOL/L — SIGNIFICANT CHANGE UP (ref 3.5–5.3)
POTASSIUM SERPL-SCNC: 4.7 MMOL/L — SIGNIFICANT CHANGE UP (ref 3.5–5.3)
PROT SERPL-MCNC: 7.9 GM/DL — SIGNIFICANT CHANGE UP (ref 6–8.3)
PROT SERPL-MCNC: 7.9 GM/DL — SIGNIFICANT CHANGE UP (ref 6–8.3)
SODIUM SERPL-SCNC: 133 MMOL/L — LOW (ref 135–145)
SODIUM SERPL-SCNC: 133 MMOL/L — LOW (ref 135–145)

## 2023-12-17 PROCEDURE — 99284 EMERGENCY DEPT VISIT MOD MDM: CPT

## 2023-12-17 PROCEDURE — 99053 MED SERV 10PM-8AM 24 HR FAC: CPT

## 2023-12-17 RX ORDER — SODIUM CHLORIDE 9 MG/ML
1000 INJECTION INTRAMUSCULAR; INTRAVENOUS; SUBCUTANEOUS ONCE
Refills: 0 | Status: DISCONTINUED | OUTPATIENT
Start: 2023-12-17 | End: 2023-12-17

## 2023-12-17 NOTE — ED PROVIDER NOTE - DISCHARGE DATE
[FreeTextEntry1] : - Start on losartan for improved  BP control\par  - Continue with all current treatments.\par  - Labs done in office\par - Further management pending lab results  17-Dec-2023

## 2023-12-17 NOTE — ED ADULT NURSE NOTE - CHIEF COMPLAINT QUOTE
911 was call pt was uncurious fs was in the 40"s at home ,fs by EMS was 62 glucagon 1 mg IM  and 15 grams glucose pt fs now 130 hx diabetic coma, pt is [place], , HIV , SZ 911 was call pt was uncurious fs was in the 40"s at home ,fs by EMS was 62 glucagon 1 mg IM  and 15 grams glucose pt fs now 130 hx diabetic coma, pt is [place], , HIV , SZ

## 2023-12-17 NOTE — ED PROVIDER NOTE - PATIENT PORTAL LINK FT
You can access the FollowMyHealth Patient Portal offered by [facility] by registering at the following website: [email or web address]. By joining [employer]’s FollowMyHealth portal, you will also be able to view your health information using other applications (apps) compatible with our system. You can access the FollowMyHealth Patient Portal offered by [facility] by registering at the following website: [email or web address]. By joining [employer]’s FollowMyHealth portal, you will also be able to view your health information using other applications (apps) compatible with our system.

## 2023-12-17 NOTE — ED PROVIDER NOTE - PHYSICAL EXAMINATION
GENERAL: sleeping but arouseable   HEENT: NC/AT, moist mucous membranes  LUNGS: CTAB, no wheezes or crackles   CARDIAC: RRR, no m/r/g  ABDOMEN: Soft,  non tender, non distended, no rebound, no guarding  EXT: No edema, no calf tenderness, no deformities.  NEURO: A&Ox3. Moving all extremities.  SKIN: Warm and dry. No rash.  PSYCH: Normal affect.

## 2023-12-17 NOTE — ED PROVIDER NOTE - CLINICAL SUMMARY MEDICAL DECISION MAKING FREE TEXT BOX
42 y/o F with PMH DM, HIV, presenting to the ED for hypoglycemia.  Vitals stable.  She is sleeping but arouseable.  Will monitor fingersticks.    CMP WNL  FS uptrending, no hypoglycemic episodes in the ED  Will discharge

## 2023-12-17 NOTE — ED ADULT NURSE REASSESSMENT NOTE - NS ED NURSE REASSESS COMMENT FT1
Pt refusing blood work and IV insertion at this time. MD aware Pt refusing blood work for CBC and IV insertion at this time. MD andrews

## 2023-12-17 NOTE — ED ADULT NURSE REASSESSMENT NOTE - NS ED NURSE REASSESS COMMENT FT1
Pt ambulates with steady gait, pt hard of hearing. Pt received discharge education and discharge papers. Pt in NAD at this time

## 2023-12-17 NOTE — ED PROVIDER NOTE - OBJECTIVE STATEMENT
42 y/o F with PMH DM, HIV, presenting to the ED for hypoglycemia. Patient was found with FS in the 40s at home, given glucagon IM and oral glucose. Patient denies any fever, chills, N/V. Patient currently sleeping but arouseable, denies any acute symptoms. 44 y/o F with PMH DM, HIV, presenting to the ED for hypoglycemia. Patient was found with FS in the 40s at home, given glucagon IM and oral glucose. Patient denies any fever, chills, N/V. Patient currently sleeping but arouseable, denies any acute symptoms.

## 2023-12-17 NOTE — ED ADULT TRIAGE NOTE - CHIEF COMPLAINT QUOTE
911 was call pt was uncurious fs was in the 40"s at home ,fs by EMS was 62 glucagon 1 mg IM  and 15 grams glucose pt fs now 130 hx diabetic coma, pt is [place], , HIV , SZ 911 was call pt was uncurious fs was in the 40"s at home ,fs by EMS was 62 glucagon 1 mg IM  and 15 grams glucose pt fs now 130 hx diabetic coma, pt is [place], , HIV , SZ

## 2023-12-17 NOTE — ED ADULT NURSE NOTE - NSFALLRISKINTERV_ED_ALL_ED
Assistance OOB with selected safe patient handling equipment if applicable/Assistance with ambulation/Communicate fall risk and risk factors to all staff, patient, and family/Provide visual cue: yellow wristband, yellow gown, etc/Reinforce activity limits and safety measures with patient and family/Call bell, personal items and telephone in reach/Instruct patient to call for assistance before getting out of bed/chair/stretcher/Non-slip footwear applied when patient is off stretcher/[place] to call system/Physically safe environment - no spills, clutter or unnecessary equipment/Purposeful Proactive Rounding/Room/bathroom lighting operational, light cord in reach Assistance OOB with selected safe patient handling equipment if applicable/Assistance with ambulation/Communicate fall risk and risk factors to all staff, patient, and family/Provide visual cue: yellow wristband, yellow gown, etc/Reinforce activity limits and safety measures with patient and family/Call bell, personal items and telephone in reach/Instruct patient to call for assistance before getting out of bed/chair/stretcher/Non-slip footwear applied when patient is off stretcher/[place] to call system/Physically safe environment - no spills, clutter or unnecessary equipment/Purposeful Proactive Rounding/Room/bathroom lighting operational, light cord in reach

## 2023-12-17 NOTE — ED ADULT NURSE NOTE - CAS EDN DISCHARGE ASSESSMENT
ambulates with steady gait/Alert and oriented to person, place and time/Patient baseline mental status/Awake

## 2023-12-17 NOTE — ED ADULT NURSE REASSESSMENT NOTE - NS ED NURSE REASSESS COMMENT FT1
Pt refusing IV insertion and blood work at this time. MD aware. Unable to start fluids at this time d/t pt refusing IV at this time Pt refusing IV insertion and blood work for CBC at this time. MD aware. Unable to start fluids at this time d/t pt refusing IV at this time

## 2023-12-17 NOTE — ED ADULT NURSE NOTE - OBJECTIVE STATEMENT
Per triage note pt found unconscious at home. FS in 40's. Given meds in field. FS in triage 163. At time of assessment pt sleepy, wakes to light touch. Pt not answering assessment questions. Respirations spontaneous and unlabored. PMH: HIV, SZ, DM

## 2023-12-17 NOTE — ED ADULT NURSE REASSESSMENT NOTE - NS ED NURSE REASSESS COMMENT FT1
Attempted to d/c patient. Pt not answering questions. Pt opens eyes and makes eye contact but does not answer questions. Pt able to speak. MD aware. Vitally stable. Pt in NAD at this time Attempted to d/c patient. Pt sleepy, arouses to voice. MD aware. Vitally stable. Pt in NAD at this time

## 2024-09-07 ENCOUNTER — EMERGENCY (EMERGENCY)
Facility: HOSPITAL | Age: 44
LOS: 0 days | Discharge: HOME | End: 2024-09-07
Attending: EMERGENCY MEDICINE
Payer: MEDICAID

## 2024-09-07 VITALS
SYSTOLIC BLOOD PRESSURE: 103 MMHG | HEART RATE: 70 BPM | DIASTOLIC BLOOD PRESSURE: 69 MMHG | OXYGEN SATURATION: 96 % | TEMPERATURE: 98 F | RESPIRATION RATE: 19 BRPM

## 2024-09-07 VITALS
WEIGHT: 139.99 LBS | SYSTOLIC BLOOD PRESSURE: 106 MMHG | TEMPERATURE: 98 F | RESPIRATION RATE: 20 BRPM | HEART RATE: 68 BPM | DIASTOLIC BLOOD PRESSURE: 73 MMHG | HEIGHT: 65 IN | OXYGEN SATURATION: 97 %

## 2024-09-07 DIAGNOSIS — F12.90 CANNABIS USE, UNSPECIFIED, UNCOMPLICATED: ICD-10-CM

## 2024-09-07 DIAGNOSIS — Z21 ASYMPTOMATIC HUMAN IMMUNODEFICIENCY VIRUS [HIV] INFECTION STATUS: ICD-10-CM

## 2024-09-07 DIAGNOSIS — E16.2 HYPOGLYCEMIA, UNSPECIFIED: ICD-10-CM

## 2024-09-07 DIAGNOSIS — N39.0 URINARY TRACT INFECTION, SITE NOT SPECIFIED: ICD-10-CM

## 2024-09-07 DIAGNOSIS — E11.649 TYPE 2 DIABETES MELLITUS WITH HYPOGLYCEMIA WITHOUT COMA: ICD-10-CM

## 2024-09-07 DIAGNOSIS — E03.9 HYPOTHYROIDISM, UNSPECIFIED: ICD-10-CM

## 2024-09-07 DIAGNOSIS — Z91.09 OTHER ALLERGY STATUS, OTHER THAN TO DRUGS AND BIOLOGICAL SUBSTANCES: ICD-10-CM

## 2024-09-07 LAB
ALBUMIN SERPL ELPH-MCNC: 3.2 G/DL — LOW (ref 3.3–5)
ALP SERPL-CCNC: 71 U/L — SIGNIFICANT CHANGE UP (ref 40–120)
ALT FLD-CCNC: 31 U/L — SIGNIFICANT CHANGE UP (ref 12–78)
ANION GAP SERPL CALC-SCNC: 9 MMOL/L — SIGNIFICANT CHANGE UP (ref 5–17)
ANISOCYTOSIS BLD QL: SLIGHT — SIGNIFICANT CHANGE UP
APPEARANCE UR: ABNORMAL
AST SERPL-CCNC: 38 U/L — HIGH (ref 15–37)
BACTERIA # UR AUTO: ABNORMAL /HPF
BASOPHILS # BLD AUTO: 0 K/UL — SIGNIFICANT CHANGE UP (ref 0–0.2)
BASOPHILS NFR BLD AUTO: 0 % — SIGNIFICANT CHANGE UP (ref 0–2)
BILIRUB SERPL-MCNC: 0.4 MG/DL — SIGNIFICANT CHANGE UP (ref 0.2–1.2)
BILIRUB UR-MCNC: NEGATIVE — SIGNIFICANT CHANGE UP
BUN SERPL-MCNC: 5 MG/DL — LOW (ref 7–23)
BURR CELLS BLD QL SMEAR: SLIGHT — SIGNIFICANT CHANGE UP
CALCIUM SERPL-MCNC: 8.6 MG/DL — SIGNIFICANT CHANGE UP (ref 8.5–10.1)
CHLORIDE SERPL-SCNC: 103 MMOL/L — SIGNIFICANT CHANGE UP (ref 96–108)
CO2 SERPL-SCNC: 20 MMOL/L — LOW (ref 22–31)
COLOR SPEC: YELLOW — SIGNIFICANT CHANGE UP
CREAT SERPL-MCNC: 1.09 MG/DL — SIGNIFICANT CHANGE UP (ref 0.5–1.3)
DIFF PNL FLD: NEGATIVE — SIGNIFICANT CHANGE UP
EGFR: 65 ML/MIN/1.73M2 — SIGNIFICANT CHANGE UP
EGFR: 65 ML/MIN/1.73M2 — SIGNIFICANT CHANGE UP
ELLIPTOCYTES BLD QL SMEAR: SLIGHT — SIGNIFICANT CHANGE UP
EOSINOPHIL # BLD AUTO: 0 K/UL — SIGNIFICANT CHANGE UP (ref 0–0.5)
EOSINOPHIL NFR BLD AUTO: 0 % — SIGNIFICANT CHANGE UP (ref 0–6)
EPI CELLS # UR: PRESENT
ETHANOL SERPL-MCNC: <10 MG/DL — SIGNIFICANT CHANGE UP (ref 0–10)
GIANT PLATELETS BLD QL SMEAR: PRESENT — SIGNIFICANT CHANGE UP
GLUCOSE SERPL-MCNC: 270 MG/DL — HIGH (ref 70–99)
GLUCOSE UR QL: >=1000 MG/DL
HCG SERPL-ACNC: <1 MIU/ML — SIGNIFICANT CHANGE UP
HCT VFR BLD CALC: 31.6 % — LOW (ref 34.5–45)
HGB BLD-MCNC: 9.5 G/DL — LOW (ref 11.5–15.5)
HYPOCHROMIA BLD QL: SIGNIFICANT CHANGE UP
KETONES UR-MCNC: NEGATIVE MG/DL — SIGNIFICANT CHANGE UP
LACTATE SERPL-SCNC: 2.4 MMOL/L — HIGH (ref 0.7–2)
LEUKOCYTE ESTERASE UR-ACNC: ABNORMAL
LYMPHOCYTES # BLD AUTO: 1.34 K/UL — SIGNIFICANT CHANGE UP (ref 1–3.3)
LYMPHOCYTES # BLD AUTO: 18 % — SIGNIFICANT CHANGE UP (ref 13–44)
MACROCYTES BLD QL: SIGNIFICANT CHANGE UP
MANUAL SMEAR VERIFICATION: SIGNIFICANT CHANGE UP
MCHC RBC-ENTMCNC: 18.9 PG — LOW (ref 27–34)
MCHC RBC-ENTMCNC: 30.1 G/DL — LOW (ref 32–36)
MCV RBC AUTO: 62.8 FL — LOW (ref 80–100)
MICROCYTES BLD QL: SIGNIFICANT CHANGE UP
MONOCYTES # BLD AUTO: 0.22 K/UL — SIGNIFICANT CHANGE UP (ref 0–0.9)
MONOCYTES NFR BLD AUTO: 3 % — SIGNIFICANT CHANGE UP (ref 2–14)
NEUTROPHILS # BLD AUTO: 5.88 K/UL — SIGNIFICANT CHANGE UP (ref 1.8–7.4)
NEUTROPHILS NFR BLD AUTO: 79 % — HIGH (ref 43–77)
NITRITE UR-MCNC: NEGATIVE — SIGNIFICANT CHANGE UP
NRBC # BLD: 0 /100 WBCS — SIGNIFICANT CHANGE UP (ref 0–0)
NRBC # BLD: SIGNIFICANT CHANGE UP /100 WBCS (ref 0–0)
NRBC BLD-RTO: 0 /100 WBCS — SIGNIFICANT CHANGE UP (ref 0–0)
NRBC BLD-RTO: SIGNIFICANT CHANGE UP /100 WBCS (ref 0–0)
OVALOCYTES BLD QL SMEAR: SIGNIFICANT CHANGE UP
PH UR: 6.5 — SIGNIFICANT CHANGE UP (ref 5–8)
PLAT MORPH BLD: NORMAL — SIGNIFICANT CHANGE UP
PLATELET # BLD AUTO: 140 K/UL — LOW (ref 150–400)
POLYCHROMASIA BLD QL SMEAR: SLIGHT — SIGNIFICANT CHANGE UP
POTASSIUM SERPL-MCNC: 4.1 MMOL/L — SIGNIFICANT CHANGE UP (ref 3.5–5.3)
POTASSIUM SERPL-SCNC: 4.1 MMOL/L — SIGNIFICANT CHANGE UP (ref 3.5–5.3)
PROT SERPL-MCNC: 7.9 GM/DL — SIGNIFICANT CHANGE UP (ref 6–8.3)
PROT UR-MCNC: NEGATIVE MG/DL — SIGNIFICANT CHANGE UP
RBC # BLD: 5.03 M/UL — SIGNIFICANT CHANGE UP (ref 3.8–5.2)
RBC # FLD: 25.6 % — HIGH (ref 10.3–14.5)
RBC BLD AUTO: ABNORMAL
RBC CASTS # UR COMP ASSIST: 0 /HPF — SIGNIFICANT CHANGE UP (ref 0–4)
SCHISTOCYTES BLD QL AUTO: SLIGHT — SIGNIFICANT CHANGE UP
SODIUM SERPL-SCNC: 132 MMOL/L — LOW (ref 135–145)
SP GR SPEC: 1.02 — SIGNIFICANT CHANGE UP (ref 1–1.03)
TROPONIN I, HIGH SENSITIVITY RESULT: <3 NG/L — SIGNIFICANT CHANGE UP
UROBILINOGEN FLD QL: 0.2 MG/DL — SIGNIFICANT CHANGE UP (ref 0.2–1)
WBC # BLD: 7.44 K/UL — SIGNIFICANT CHANGE UP (ref 3.8–10.5)
WBC # FLD AUTO: 7.44 K/UL — SIGNIFICANT CHANGE UP (ref 3.8–10.5)
WBC UR QL: 8 /HPF — HIGH (ref 0–5)

## 2024-09-07 PROCEDURE — 99284 EMERGENCY DEPT VISIT MOD MDM: CPT

## 2024-09-07 PROCEDURE — 99053 MED SERV 10PM-8AM 24 HR FAC: CPT

## 2024-09-07 PROCEDURE — 71045 X-RAY EXAM CHEST 1 VIEW: CPT | Mod: 26

## 2024-09-07 RX ORDER — CEFUROXIME SODIUM 1.5 G
500 VIAL (EA) INJECTION ONCE
Refills: 0 | Status: COMPLETED | OUTPATIENT
Start: 2024-09-07 | End: 2024-09-07

## 2024-09-07 RX ORDER — CEFUROXIME SODIUM 1.5 G
1 VIAL (EA) INJECTION
Qty: 14 | Refills: 0
Start: 2024-09-07 | End: 2024-09-13

## 2024-09-07 RX ORDER — LORAZEPAM 4 MG/ML
2 VIAL (ML) INJECTION ONCE
Refills: 0 | Status: DISCONTINUED | OUTPATIENT
Start: 2024-09-07 | End: 2024-09-07

## 2024-09-07 RX ADMIN — Medication 5 UNIT(S): at 10:48

## 2024-09-07 RX ADMIN — Medication 500 MILLIGRAM(S): at 12:07

## 2024-09-24 NOTE — H&P ADULT - ASSESSMENT
Contacted patient regarding results of xray. Patient understood results and agreed to move forward with MRI. Provided patient with number to schedule MRI. Patient had no further questions at this time. Encouraged patient to call the clinic if any questions or concerns arise in the future.      Patient is a 40F with a PMH of DM2 and HIV on [place] who presents to the ED for nausea and vomiting.  Patient currently asleep but is a poor historian, mother at the bedside to provide history.  Patient has been on her prescribed insulin regimen but mother notes poor PO intake today with multiple episodes of NBNB vomiting last night.    Of note, mother notes that patient recently returned home from [facility] after being at a rehab center for about a year.  In 12/19, patient was found down and hypoglycemic for an unknown time.  Patient reportedly was in a coma for months and never returned back to baseline mental status.  Patient reportedly has a wobbly gait but walks without assistance devices.  Able to communicate basic needs but unable to string sentences together.    Minor tachycardia in ED.  Labs reveal DKA, leukocytosis, and lactic acidosis.  DKA improved in ED.  Will admit to med surg    IMPROVE VTE Individual Risk Assessment          RISK                                                          Points  [  ] Previous VTE                                                3  [  ] Thrombophilia                                             2  [  ] Lower limb paralysis                                    2        (unable to hold up >15 seconds)    [  ] Current Cancer                                             2         (within 6 months)  [  ] Immobilization > 24 hrs                              1  [  ] ICU/CCU stay > 24 hours                            1  [  ] Age > 60                                                    1    IMPROVE VTE Score - 0

## 2025-02-23 ENCOUNTER — EMERGENCY (EMERGENCY)
Facility: HOSPITAL | Age: 45
LOS: 0 days | Discharge: HOME | End: 2025-02-23
Attending: STUDENT IN AN ORGANIZED HEALTH CARE EDUCATION/TRAINING PROGRAM
Payer: MEDICAID

## 2025-02-23 VITALS
HEART RATE: 78 BPM | DIASTOLIC BLOOD PRESSURE: 81 MMHG | TEMPERATURE: 98 F | RESPIRATION RATE: 18 BRPM | OXYGEN SATURATION: 96 % | SYSTOLIC BLOOD PRESSURE: 102 MMHG

## 2025-02-23 VITALS
HEIGHT: 63 IN | HEART RATE: 82 BPM | DIASTOLIC BLOOD PRESSURE: 62 MMHG | TEMPERATURE: 98 F | RESPIRATION RATE: 16 BRPM | SYSTOLIC BLOOD PRESSURE: 90 MMHG

## 2025-02-23 DIAGNOSIS — T38.3X6A UNDERDOSING OF INSULIN AND ORAL HYPOGLYCEMIC [ANTIDIABETIC] DRUGS, INITIAL ENCOUNTER: ICD-10-CM

## 2025-02-23 DIAGNOSIS — E11.65 TYPE 2 DIABETES MELLITUS WITH HYPERGLYCEMIA: ICD-10-CM

## 2025-02-23 DIAGNOSIS — Z91.048 OTHER NONMEDICINAL SUBSTANCE ALLERGY STATUS: ICD-10-CM

## 2025-02-23 DIAGNOSIS — Z91.138 PATIENT'S UNINTENTIONAL UNDERDOSING OF MEDICATION REGIMEN FOR OTHER REASON: ICD-10-CM

## 2025-02-23 LAB
ACETONE SERPL-MCNC: NEGATIVE — SIGNIFICANT CHANGE UP
ALBUMIN SERPL ELPH-MCNC: 4.2 G/DL — SIGNIFICANT CHANGE UP (ref 3.3–5)
ALP SERPL-CCNC: 93 U/L — SIGNIFICANT CHANGE UP (ref 40–120)
ALT FLD-CCNC: 120 U/L — HIGH (ref 12–78)
ANION GAP SERPL CALC-SCNC: 7 MMOL/L — SIGNIFICANT CHANGE UP (ref 5–17)
AST SERPL-CCNC: 65 U/L — HIGH (ref 15–37)
BASOPHILS # BLD AUTO: 0 K/UL — SIGNIFICANT CHANGE UP (ref 0–0.2)
BASOPHILS NFR BLD AUTO: 0 % — SIGNIFICANT CHANGE UP (ref 0–2)
BILIRUB SERPL-MCNC: 0.6 MG/DL — SIGNIFICANT CHANGE UP (ref 0.2–1.2)
BUN SERPL-MCNC: 19 MG/DL — SIGNIFICANT CHANGE UP (ref 7–23)
CALCIUM SERPL-MCNC: 9.4 MG/DL — SIGNIFICANT CHANGE UP (ref 8.5–10.1)
CHLORIDE SERPL-SCNC: 100 MMOL/L — SIGNIFICANT CHANGE UP (ref 96–108)
CO2 SERPL-SCNC: 28 MMOL/L — SIGNIFICANT CHANGE UP (ref 22–31)
CREAT SERPL-MCNC: 1.14 MG/DL — SIGNIFICANT CHANGE UP (ref 0.5–1.3)
EGFR: 62 ML/MIN/1.73M2 — SIGNIFICANT CHANGE UP
EOSINOPHIL # BLD AUTO: 0.24 K/UL — SIGNIFICANT CHANGE UP (ref 0–0.5)
EOSINOPHIL NFR BLD AUTO: 2 % — SIGNIFICANT CHANGE UP (ref 0–6)
GAS PNL BLDV: SIGNIFICANT CHANGE UP
GLUCOSE SERPL-MCNC: 328 MG/DL — HIGH (ref 70–99)
HCG SERPL-ACNC: 2 MIU/ML — SIGNIFICANT CHANGE UP
HCT VFR BLD CALC: 33.2 % — LOW (ref 34.5–45)
HGB BLD-MCNC: 10.3 G/DL — LOW (ref 11.5–15.5)
LACTATE SERPL-SCNC: 1.3 MMOL/L — SIGNIFICANT CHANGE UP (ref 0.7–2)
LYMPHOCYTES # BLD AUTO: 2.7 K/UL — SIGNIFICANT CHANGE UP (ref 1–3.3)
LYMPHOCYTES # BLD AUTO: 23 % — SIGNIFICANT CHANGE UP (ref 13–44)
MAGNESIUM SERPL-MCNC: 1.9 MG/DL — SIGNIFICANT CHANGE UP (ref 1.6–2.6)
MCHC RBC-ENTMCNC: 21.8 PG — LOW (ref 27–34)
MCHC RBC-ENTMCNC: 31 G/DL — LOW (ref 32–36)
MCV RBC AUTO: 70.2 FL — LOW (ref 80–100)
MONOCYTES # BLD AUTO: 0.12 K/UL — SIGNIFICANT CHANGE UP (ref 0–0.9)
MONOCYTES NFR BLD AUTO: 1 % — LOW (ref 2–14)
NEUTROPHILS # BLD AUTO: 8.7 K/UL — HIGH (ref 1.8–7.4)
NEUTROPHILS NFR BLD AUTO: 74 % — SIGNIFICANT CHANGE UP (ref 43–77)
NRBC BLD AUTO-RTO: SIGNIFICANT CHANGE UP /100 WBCS (ref 0–0)
PLATELET # BLD AUTO: 187 K/UL — SIGNIFICANT CHANGE UP (ref 150–400)
POTASSIUM SERPL-MCNC: 3.7 MMOL/L — SIGNIFICANT CHANGE UP (ref 3.5–5.3)
POTASSIUM SERPL-SCNC: 3.7 MMOL/L — SIGNIFICANT CHANGE UP (ref 3.5–5.3)
PROT SERPL-MCNC: 9.1 GM/DL — HIGH (ref 6–8.3)
RBC # BLD: 4.73 M/UL — SIGNIFICANT CHANGE UP (ref 3.8–5.2)
RBC # FLD: 26.2 % — HIGH (ref 10.3–14.5)
SODIUM SERPL-SCNC: 135 MMOL/L — SIGNIFICANT CHANGE UP (ref 135–145)
WBC # BLD: 11.75 K/UL — HIGH (ref 3.8–10.5)
WBC # FLD AUTO: 11.75 K/UL — HIGH (ref 3.8–10.5)

## 2025-02-23 PROCEDURE — 99285 EMERGENCY DEPT VISIT HI MDM: CPT

## 2025-02-23 RX ORDER — INSULIN LISPRO 100/ML
10 VIAL (ML) SUBCUTANEOUS
Qty: 1 | Refills: 0
Start: 2025-02-23 | End: 2025-03-24

## 2025-02-23 RX ORDER — BICTEGRAVIR SODIUM, EMTRICITABINE, AND TENOFOVIR ALAFENAMIDE FUMARATE 50; 200; 25 MG/1; MG/1; MG/1
1 TABLET ORAL
Qty: 30 | Refills: 0
Start: 2025-02-23 | End: 2025-03-24

## 2025-02-23 RX ORDER — BACTERIOSTATIC SODIUM CHLORIDE 0.9 %
1000 VIAL (ML) INJECTION ONCE
Refills: 0 | Status: COMPLETED | OUTPATIENT
Start: 2025-02-23 | End: 2025-02-23

## 2025-02-23 RX ORDER — INSULIN GLARGINE-YFGN 100 [IU]/ML
10 INJECTION, SOLUTION SUBCUTANEOUS
Qty: 1 | Refills: 0
Start: 2025-02-23

## 2025-02-23 RX ORDER — METFORMIN HYDROCHLORIDE 1000 MG/1
1 TABLET, COATED ORAL
Qty: 30 | Refills: 0
Start: 2025-02-23 | End: 2025-03-24

## 2025-02-23 RX ORDER — INSULIN GLARGINE-YFGN 100 [IU]/ML
28 INJECTION, SOLUTION SUBCUTANEOUS
Qty: 840 | Refills: 1
Start: 2025-02-23 | End: 2025-04-23

## 2025-02-23 RX ORDER — INSULIN GLARGINE-YFGN 100 [IU]/ML
28 INJECTION, SOLUTION SUBCUTANEOUS
Qty: 1 | Refills: 1
Start: 2025-02-23 | End: 2025-04-23

## 2025-02-23 RX ADMIN — Medication 1000 MILLILITER(S): at 17:53

## 2025-02-23 RX ADMIN — Medication 5 UNIT(S): at 18:55

## 2025-02-23 NOTE — ED PROVIDER NOTE - PATIENT PORTAL LINK FT
You can access the FollowMyHealth Patient Portal offered by [facility] by registering at the following website: [email or web address]. By joining [employer]’s FollowMyHealth portal, you will also be able to view your health information using other applications (apps) compatible with our system.

## 2025-02-23 NOTE — ED ADULT NURSE NOTE - OBJECTIVE STATEMENT
43 yo female, A&Ox4, presents to ED accompanied by sister. Per sister, patient has high blood sugar levels since this morning, requesting to be given insulin right away, Fingerstick 452 and patient refused repeat stick after several attempts. Patient with suspected psych diagnoses, although sister does not want to discuss because "patient isn't here for that". States patient ran out of insulin and hasn't had it today, PMH:  juvenile DM.

## 2025-02-23 NOTE — ED ADULT TRIAGE NOTE - CHIEF COMPLAINT QUOTE
patient to ed with sister reorts high blood sugar levels since this morning, requesting to be given insulin right away, Fingerstick 452 and patient refused repeat stick after several attempts.  H/O juvenile DM.

## 2025-02-23 NOTE — ED ADULT NURSE NOTE - NSFALLUNIVINTERV_ED_ALL_ED
Bed/Stretcher in lowest position, wheels locked, appropriate side rails in place/Call bell, personal items and telephone in reach/Instruct patient to call for assistance before getting out of bed/chair/stretcher/Non-slip footwear applied when patient is off stretcher/[place] to call system/Physically safe environment - no spills, clutter or unnecessary equipment/Purposeful proactive rounding/Room/bathroom lighting operational, light cord in reach

## 2025-02-23 NOTE — ED PROVIDER NOTE - CLINICAL SUMMARY MEDICAL DECISION MAKING FREE TEXT BOX
44-year-old female pmhx of DM comes to ED w/ hyperglycemia secondary to missed insulin doses. Started after patient ran out of the insulin dose this morning.  Patient denies any symptoms. Otherwise ROS negative.    General: non-toxic, NAD   HEENT: NCAT, PERRL   Cardiac: RRR, no murmurs, 2+ peripheral pulses   Chest: CTA   Abdomen: soft, non-distended, bowel sounds present, no ttp, no rebound or guarding   Extremities: no peripheral edema, calf tenderness, or leg size discrepancies   Skin: no rashes   Neuro: Awake and alert, 5+motor, sensory grossly intact   Psych: mood and affect appropriate    Impression: 44-year-old female pmhx of DM comes to ED w/ hyperglycemia secondary to missed insulin doses. Their presentation is concerning for hyperglycemia secondary to missed insulin doses.  Plan to rule out DKA or other hyperglycemia related illnesses.    Ordered labs, medications for diagnosis, management, and treatment. 44-year-old female pmhx of DM comes to ED w/ hyperglycemia secondary to missed insulin doses. Started after patient ran out of the insulin dose this morning.  Patient denies any symptoms. Otherwise ROS negative.    General: non-toxic, NAD   HEENT: NCAT, PERRL   Cardiac: RRR, no murmurs, 2+ peripheral pulses   Chest: CTA   Abdomen: soft, non-distended, bowel sounds present, no ttp, no rebound or guarding   Extremities: no peripheral edema, calf tenderness, or leg size discrepancies   Skin: no rashes   Neuro: Awake and alert, 5+motor, sensory grossly intact   Psych: mood and affect appropriate    Impression: 44-year-old female pmhx of DM comes to ED w/ hyperglycemia secondary to missed insulin doses. Their presentation is concerning for hyperglycemia secondary to missed insulin doses.  Plan to rule out DKA or other hyperglycemia related illnesses.    Ordered labs, medications for diagnosis, management, and treatment.     No ketosis noted, hyperglycemia given fluids and insulin and will recheck to be below 300 prior to dc from ED.

## 2025-02-24 RX ORDER — INSULIN ASPART 100 [IU]/ML
6 INJECTION, SOLUTION INTRAVENOUS; SUBCUTANEOUS
Qty: 1 | Refills: 0
Start: 2025-02-24 | End: 2025-03-25

## 2025-04-26 ENCOUNTER — EMERGENCY (EMERGENCY)
Facility: HOSPITAL | Age: 45
LOS: 0 days | Discharge: LEFT BEFORE DISCHARGE | End: 2025-04-26
Attending: STUDENT IN AN ORGANIZED HEALTH CARE EDUCATION/TRAINING PROGRAM
Payer: MEDICAID

## 2025-04-26 VITALS
HEIGHT: 62 IN | OXYGEN SATURATION: 100 % | WEIGHT: 115.96 LBS | HEART RATE: 100 BPM | DIASTOLIC BLOOD PRESSURE: 90 MMHG | TEMPERATURE: 98 F | SYSTOLIC BLOOD PRESSURE: 128 MMHG | RESPIRATION RATE: 19 BRPM

## 2025-04-26 DIAGNOSIS — E03.9 HYPOTHYROIDISM, UNSPECIFIED: ICD-10-CM

## 2025-04-26 DIAGNOSIS — E11.65 TYPE 2 DIABETES MELLITUS WITH HYPERGLYCEMIA: ICD-10-CM

## 2025-04-26 DIAGNOSIS — F31.9 BIPOLAR DISORDER, UNSPECIFIED: ICD-10-CM

## 2025-04-26 DIAGNOSIS — Z91.048 OTHER NONMEDICINAL SUBSTANCE ALLERGY STATUS: ICD-10-CM

## 2025-04-26 DIAGNOSIS — Z21 ASYMPTOMATIC HUMAN IMMUNODEFICIENCY VIRUS [HIV] INFECTION STATUS: ICD-10-CM

## 2025-04-26 DIAGNOSIS — Z53.29 PROCEDURE AND TREATMENT NOT CARRIED OUT BECAUSE OF PATIENT'S DECISION FOR OTHER REASONS: ICD-10-CM

## 2025-04-26 PROCEDURE — 99284 EMERGENCY DEPT VISIT MOD MDM: CPT
